# Patient Record
Sex: MALE | Race: BLACK OR AFRICAN AMERICAN | Employment: FULL TIME | URBAN - METROPOLITAN AREA
[De-identification: names, ages, dates, MRNs, and addresses within clinical notes are randomized per-mention and may not be internally consistent; named-entity substitution may affect disease eponyms.]

---

## 2017-08-02 ENCOUNTER — HOSPITAL ENCOUNTER (OUTPATIENT)
Dept: MRI IMAGING | Age: 47
Discharge: HOME | End: 2017-08-02
Payer: OTHER GOVERNMENT

## 2017-08-02 DIAGNOSIS — M25.561 KNEE PAIN, BILATERAL: ICD-10-CM

## 2017-08-02 DIAGNOSIS — M23.8X1 OTHER INTERNAL DERANGEMENTS OF RIGHT KNEE: ICD-10-CM

## 2017-08-02 DIAGNOSIS — M25.562 KNEE PAIN, BILATERAL: ICD-10-CM

## 2017-08-02 PROCEDURE — 73721 MRI JNT OF LWR EXTRE W/O DYE: CPT

## 2018-10-05 ENCOUNTER — APPOINTMENT (OUTPATIENT)
Dept: GENERAL RADIOLOGY | Age: 48
DRG: 853 | End: 2018-10-05
Attending: EMERGENCY MEDICINE
Payer: COMMERCIAL

## 2018-10-05 ENCOUNTER — HOSPITAL ENCOUNTER (INPATIENT)
Age: 48
LOS: 12 days | Discharge: NURSING, REHAB OR LONG-TERM CARE | DRG: 853 | End: 2018-10-17
Attending: EMERGENCY MEDICINE | Admitting: INTERNAL MEDICINE
Payer: COMMERCIAL

## 2018-10-05 DIAGNOSIS — M86.10 ACUTE OSTEOMYELITIS (HCC): Primary | ICD-10-CM

## 2018-10-05 DIAGNOSIS — R65.21 SEPTIC SHOCK (HCC): ICD-10-CM

## 2018-10-05 DIAGNOSIS — E83.42 HYPOMAGNESEMIA: ICD-10-CM

## 2018-10-05 DIAGNOSIS — E87.6 HYPOKALEMIA: ICD-10-CM

## 2018-10-05 DIAGNOSIS — A41.9 SEPTIC SHOCK (HCC): ICD-10-CM

## 2018-10-05 PROBLEM — E11.8 DIABETIC FOOT (HCC): Status: ACTIVE | Noted: 2018-10-05

## 2018-10-05 LAB
ABO + RH BLD: NORMAL
ALBUMIN SERPL-MCNC: 2.6 G/DL (ref 3.4–5)
ALBUMIN/GLOB SERPL: 0.5 {RATIO} (ref 0.8–1.7)
ALP SERPL-CCNC: 313 U/L (ref 45–117)
ALT SERPL-CCNC: 57 U/L (ref 16–61)
ANION GAP SERPL CALC-SCNC: 15 MMOL/L (ref 3–18)
APPEARANCE UR: CLEAR
AST SERPL-CCNC: 29 U/L (ref 15–37)
BACTERIA URNS QL MICRO: ABNORMAL /HPF
BASOPHILS # BLD: 0 K/UL (ref 0–0.1)
BASOPHILS NFR BLD: 0 % (ref 0–2)
BILIRUB SERPL-MCNC: 1.7 MG/DL (ref 0.2–1)
BILIRUB UR QL: NEGATIVE
BLOOD GROUP ANTIBODIES SERPL: NORMAL
BUN SERPL-MCNC: 7 MG/DL (ref 7–18)
BUN/CREAT SERPL: 8 (ref 12–20)
CALCIUM SERPL-MCNC: 8.2 MG/DL (ref 8.5–10.1)
CHLORIDE SERPL-SCNC: 94 MMOL/L (ref 100–108)
CO2 SERPL-SCNC: 22 MMOL/L (ref 21–32)
COLOR UR: YELLOW
CREAT SERPL-MCNC: 0.84 MG/DL (ref 0.6–1.3)
DIFFERENTIAL METHOD BLD: ABNORMAL
EOSINOPHIL # BLD: 0 K/UL (ref 0–0.4)
EOSINOPHIL NFR BLD: 0 % (ref 0–5)
EPITH CASTS URNS QL MICRO: ABNORMAL /LPF (ref 0–5)
ERYTHROCYTE [DISTWIDTH] IN BLOOD BY AUTOMATED COUNT: 13.2 % (ref 11.6–14.5)
ERYTHROCYTE [SEDIMENTATION RATE] IN BLOOD: 39 MM/HR (ref 0–15)
EST. AVERAGE GLUCOSE BLD GHB EST-MCNC: 229 MG/DL
GLOBULIN SER CALC-MCNC: 4.8 G/DL (ref 2–4)
GLUCOSE BLD STRIP.AUTO-MCNC: 292 MG/DL (ref 70–110)
GLUCOSE SERPL-MCNC: 258 MG/DL (ref 74–99)
GLUCOSE UR STRIP.AUTO-MCNC: >1000 MG/DL
HBA1C MFR BLD: 9.6 % (ref 4.2–5.6)
HCT VFR BLD AUTO: 38.2 % (ref 36–48)
HGB BLD-MCNC: 13.6 G/DL (ref 13–16)
HGB UR QL STRIP: NEGATIVE
INR PPP: 1.1 (ref 0.8–1.2)
KETONES UR QL STRIP.AUTO: NEGATIVE MG/DL
LACTATE BLD-SCNC: 4.5 MMOL/L (ref 0.4–2)
LEUKOCYTE ESTERASE UR QL STRIP.AUTO: NEGATIVE
LYMPHOCYTES # BLD: 1.4 K/UL (ref 0.9–3.6)
LYMPHOCYTES NFR BLD: 10 % (ref 21–52)
MAGNESIUM SERPL-MCNC: 1.2 MG/DL (ref 1.6–2.6)
MCH RBC QN AUTO: 29.9 PG (ref 24–34)
MCHC RBC AUTO-ENTMCNC: 35.6 G/DL (ref 31–37)
MCV RBC AUTO: 84 FL (ref 74–97)
MONOCYTES # BLD: 0.2 K/UL (ref 0.05–1.2)
MONOCYTES NFR BLD: 2 % (ref 3–10)
MUCOUS THREADS URNS QL MICRO: ABNORMAL /LPF
NEUTS SEG # BLD: 13.2 K/UL (ref 1.8–8)
NEUTS SEG NFR BLD: 88 % (ref 40–73)
NITRITE UR QL STRIP.AUTO: NEGATIVE
PH UR STRIP: 5.5 [PH] (ref 5–8)
PLATELET # BLD AUTO: 181 K/UL (ref 135–420)
PMV BLD AUTO: 10.4 FL (ref 9.2–11.8)
POTASSIUM SERPL-SCNC: 3 MMOL/L (ref 3.5–5.5)
PROT SERPL-MCNC: 7.4 G/DL (ref 6.4–8.2)
PROT UR STRIP-MCNC: ABNORMAL MG/DL
PROTHROMBIN TIME: 13.4 SEC (ref 11.5–15.2)
RBC # BLD AUTO: 4.55 M/UL (ref 4.7–5.5)
RBC #/AREA URNS HPF: ABNORMAL /HPF (ref 0–5)
SODIUM SERPL-SCNC: 131 MMOL/L (ref 136–145)
SP GR UR REFRACTOMETRY: 1.01 (ref 1–1.03)
SPECIMEN EXP DATE BLD: NORMAL
TROPONIN I SERPL-MCNC: <0.02 NG/ML (ref 0–0.04)
UROBILINOGEN UR QL STRIP.AUTO: 2 EU/DL (ref 0.2–1)
WBC # BLD AUTO: 14.9 K/UL (ref 4.6–13.2)
WBC URNS QL MICRO: ABNORMAL /HPF (ref 0–4)

## 2018-10-05 PROCEDURE — 83735 ASSAY OF MAGNESIUM: CPT | Performed by: EMERGENCY MEDICINE

## 2018-10-05 PROCEDURE — 82962 GLUCOSE BLOOD TEST: CPT

## 2018-10-05 PROCEDURE — 81001 URINALYSIS AUTO W/SCOPE: CPT | Performed by: EMERGENCY MEDICINE

## 2018-10-05 PROCEDURE — 85610 PROTHROMBIN TIME: CPT | Performed by: EMERGENCY MEDICINE

## 2018-10-05 PROCEDURE — 99285 EMERGENCY DEPT VISIT HI MDM: CPT

## 2018-10-05 PROCEDURE — 86900 BLOOD TYPING SEROLOGIC ABO: CPT | Performed by: EMERGENCY MEDICINE

## 2018-10-05 PROCEDURE — 80053 COMPREHEN METABOLIC PANEL: CPT | Performed by: EMERGENCY MEDICINE

## 2018-10-05 PROCEDURE — 96365 THER/PROPH/DIAG IV INF INIT: CPT

## 2018-10-05 PROCEDURE — 83036 HEMOGLOBIN GLYCOSYLATED A1C: CPT | Performed by: HOSPITALIST

## 2018-10-05 PROCEDURE — 83605 ASSAY OF LACTIC ACID: CPT

## 2018-10-05 PROCEDURE — 85652 RBC SED RATE AUTOMATED: CPT | Performed by: SINGLE SPECIALTY

## 2018-10-05 PROCEDURE — 87040 BLOOD CULTURE FOR BACTERIA: CPT | Performed by: EMERGENCY MEDICINE

## 2018-10-05 PROCEDURE — 85025 COMPLETE CBC W/AUTO DIFF WBC: CPT | Performed by: EMERGENCY MEDICINE

## 2018-10-05 PROCEDURE — 84484 ASSAY OF TROPONIN QUANT: CPT | Performed by: EMERGENCY MEDICINE

## 2018-10-05 PROCEDURE — 74011250637 HC RX REV CODE- 250/637: Performed by: EMERGENCY MEDICINE

## 2018-10-05 PROCEDURE — 86141 C-REACTIVE PROTEIN HS: CPT | Performed by: SINGLE SPECIALTY

## 2018-10-05 PROCEDURE — 71045 X-RAY EXAM CHEST 1 VIEW: CPT

## 2018-10-05 PROCEDURE — 74011000250 HC RX REV CODE- 250: Performed by: EMERGENCY MEDICINE

## 2018-10-05 PROCEDURE — 93005 ELECTROCARDIOGRAM TRACING: CPT

## 2018-10-05 PROCEDURE — 73630 X-RAY EXAM OF FOOT: CPT

## 2018-10-05 PROCEDURE — 65660000001 HC RM ICU INTERMED STEPDOWN

## 2018-10-05 PROCEDURE — 74011250636 HC RX REV CODE- 250/636: Performed by: EMERGENCY MEDICINE

## 2018-10-05 PROCEDURE — 74011000258 HC RX REV CODE- 258: Performed by: EMERGENCY MEDICINE

## 2018-10-05 PROCEDURE — 96360 HYDRATION IV INFUSION INIT: CPT

## 2018-10-05 RX ORDER — FENTANYL CITRATE 50 UG/ML
50 INJECTION, SOLUTION INTRAMUSCULAR; INTRAVENOUS ONCE
Status: COMPLETED | OUTPATIENT
Start: 2018-10-05 | End: 2018-10-05

## 2018-10-05 RX ORDER — NOREPINEPHRINE BIT/0.9 % NACL 8 MG/250ML
2-16 INFUSION BOTTLE (ML) INTRAVENOUS
Status: DISCONTINUED | OUTPATIENT
Start: 2018-10-05 | End: 2018-10-06

## 2018-10-05 RX ORDER — VANCOMYCIN/0.9 % SOD CHLORIDE 1.5G/250ML
1500 PLASTIC BAG, INJECTION (ML) INTRAVENOUS EVERY 8 HOURS
Status: DISCONTINUED | OUTPATIENT
Start: 2018-10-06 | End: 2018-10-05

## 2018-10-05 RX ORDER — DEXTROSE 50 % IN WATER (D50W) INTRAVENOUS SYRINGE
25-50 AS NEEDED
Status: DISCONTINUED | OUTPATIENT
Start: 2018-10-05 | End: 2018-10-06 | Stop reason: SDUPTHER

## 2018-10-05 RX ORDER — INSULIN LISPRO 100 [IU]/ML
INJECTION, SOLUTION INTRAVENOUS; SUBCUTANEOUS EVERY 6 HOURS
Status: DISCONTINUED | OUTPATIENT
Start: 2018-10-06 | End: 2018-10-06

## 2018-10-05 RX ORDER — POTASSIUM CHLORIDE 7.45 MG/ML
10 INJECTION INTRAVENOUS
Status: COMPLETED | OUTPATIENT
Start: 2018-10-05 | End: 2018-10-06

## 2018-10-05 RX ORDER — SODIUM CHLORIDE 0.9 % (FLUSH) 0.9 %
5-10 SYRINGE (ML) INJECTION AS NEEDED
Status: DISCONTINUED | OUTPATIENT
Start: 2018-10-05 | End: 2018-10-17 | Stop reason: HOSPADM

## 2018-10-05 RX ORDER — VANCOMYCIN/0.9 % SOD CHLORIDE 1.5G/250ML
1500 PLASTIC BAG, INJECTION (ML) INTRAVENOUS EVERY 8 HOURS
Status: DISCONTINUED | OUTPATIENT
Start: 2018-10-06 | End: 2018-10-07

## 2018-10-05 RX ORDER — MORPHINE SULFATE 2 MG/ML
2 INJECTION, SOLUTION INTRAMUSCULAR; INTRAVENOUS
Status: DISCONTINUED | OUTPATIENT
Start: 2018-10-05 | End: 2018-10-06

## 2018-10-05 RX ORDER — VANCOMYCIN 2 GRAM/500 ML IN 0.9 % SODIUM CHLORIDE INTRAVENOUS
2000 ONCE
Status: COMPLETED | OUTPATIENT
Start: 2018-10-05 | End: 2018-10-05

## 2018-10-05 RX ORDER — MAGNESIUM SULFATE 100 %
4 CRYSTALS MISCELLANEOUS AS NEEDED
Status: DISCONTINUED | OUTPATIENT
Start: 2018-10-05 | End: 2018-10-06 | Stop reason: SDUPTHER

## 2018-10-05 RX ORDER — ACETAMINOPHEN 500 MG
1000 TABLET ORAL
Status: COMPLETED | OUTPATIENT
Start: 2018-10-05 | End: 2018-10-05

## 2018-10-05 RX ADMIN — SODIUM CHLORIDE 1000 ML: 900 INJECTION, SOLUTION INTRAVENOUS at 19:38

## 2018-10-05 RX ADMIN — SODIUM CHLORIDE 1000 ML: 900 INJECTION, SOLUTION INTRAVENOUS at 22:28

## 2018-10-05 RX ADMIN — ACETAMINOPHEN 1000 MG: 500 TABLET, FILM COATED ORAL at 23:30

## 2018-10-05 RX ADMIN — POTASSIUM CHLORIDE 10 MEQ: 7.46 INJECTION, SOLUTION INTRAVENOUS at 23:45

## 2018-10-05 RX ADMIN — VANCOMYCIN HYDROCHLORIDE 2000 MG: 10 INJECTION, POWDER, LYOPHILIZED, FOR SOLUTION INTRAVENOUS at 20:27

## 2018-10-05 RX ADMIN — FENTANYL CITRATE 50 MCG: 50 INJECTION, SOLUTION INTRAMUSCULAR; INTRAVENOUS at 23:30

## 2018-10-05 RX ADMIN — SODIUM CHLORIDE 1000 ML: 900 INJECTION, SOLUTION INTRAVENOUS at 19:54

## 2018-10-05 RX ADMIN — NOREPINEPHRINE BITARTRATE 2 MCG/MIN: 1 INJECTION INTRAVENOUS at 23:38

## 2018-10-05 RX ADMIN — PIPERACILLIN SODIUM,TAZOBACTAM SODIUM 4.5 G: 4; .5 INJECTION, POWDER, FOR SOLUTION INTRAVENOUS at 19:51

## 2018-10-05 RX ADMIN — SODIUM CHLORIDE 394 ML: 900 INJECTION, SOLUTION INTRAVENOUS at 21:57

## 2018-10-05 NOTE — ED TRIAGE NOTES
Seen Wednesday for same complaints at [facility]. Pain all over, chills, wound on foot. . Code sepsis called at 0700 to charge nurse. SOB.

## 2018-10-05 NOTE — ED PROVIDER NOTES
EMERGENCY DEPARTMENT HISTORY AND PHYSICAL EXAM 
 
7:22 PM 
 
 
Date: 10/5/2018 Patient Name: Yajaira Arrington History of Presenting Illness Chief Complaint Patient presents with  Shortness of Breath  Foot Pain  Blurred Vision History Provided By: Patient's Wife Chief Complaint: SOB Duration:  today Timing:  Constant Location: N/A Quality: N/A Severity: Moderate Modifying Factors: No modifying factors Associated Symptoms: Generalized myalgia, right foot abcess, numbness, visual disturbances Additional History (Context):7:28 PM Yajaira Arrington is a 41 y.o. male with h/o diabetes and sarcoidosis who presents to ED complaining of constant moderate SOB onset today, associated with generalized myalgia, numbness of toes, fingers, and legs, as well as visual disturbances--all with no modifying factors. Patients wife provided history consisting of NKDA, MI, diabetes, sarcoidosis, chemotherapy, steroid injections, lung biopsy, US of toes and claims the patient has had generalized constant myalgia for days, as well as alternating sweats/chills. The patient also presents with abscess of the second toe on the right foot associated with black toes and numbness extending to the legs. A PCP at the [place] and provider in wound care previously seen for the issue. No other concerns or symptoms at this time. PCP: None Current Facility-Administered Medications Medication Dose Route Frequency Provider Last Rate Last Dose  fentaNYL citrate (PF) injection 100 mcg  100 mcg IntraVENous NOW Oren Miramontes MD      
 magnesium sulfate 2 g/50 ml IVPB (premix or compounded)  2 g IntraVENous ONCE Oren Miramontes MD      
 magnesium sulfate 2 g/50 ml IVPB (premix or compounded)  2 g IntraVENous Megan Jones MD      
 sodium chloride (NS) flush 5-10 mL  5-10 mL IntraVENous PRN Oren Miramontes MD      
 piperacillin-tazobactam (ZOSYN) 4.5 g in 0.9% sodium chloride (MBP/ADV) 100 mL MBP  4.5 g IntraVENous Q6H Khari Baird MD   Stopped at 10/05/18 2023  VANCOMYCIN INFORMATION NOTE   Other Rx Dosing/Monitoring Khari Baird MD      
 insulin lispro (HUMALOG) injection   SubCUTAneous Q6H Barbra Alexander MD      
 glucose chewable tablet 16 g  4 Tab Oral PRN Barbra Alexander MD      
 glucagon (GLUCAGEN) injection 1 mg  1 mg IntraMUSCular PRN Barbra Alexander MD      
 dextrose (D50W) injection syrg 12.5-25 g  25-50 mL IntraVENous PRN Barbra Alexander MD      
 morphine injection 2 mg  2 mg IntraVENous Q4H PRN Barbra Alexander MD      
 NOREPINephrine (LEVOPHED) 8 mg in 0.9% NS 250ml infusion  2-16 mcg/min IntraVENous TITRATE Khari Baird MD 3.8 mL/hr at 10/05/18 2338 2 mcg/min at 10/05/18 2338  sodium chloride 0.9 % bolus infusion 1,000 mL  1,000 mL IntraVENous Jason Flood MD      
 sodium chloride 0.9 % bolus infusion 1,000 mL  1,000 mL IntraVENous Jason Flood MD      
 [START ON 10/7/2018] VANCOMYCIN INFORMATION NOTE   Other ONCE Ana Shin DPM      
 vancomycin (VANCOCIN) 1500 mg in  ml infusion  1,500 mg IntraVENous Q8H Ana Shin DPM      
 
Current Outpatient Prescriptions Medication Sig Dispense Refill  metroNIDAZOLE (FLAGYL) 500 mg tablet Take 1 Tab by mouth three (3) times daily. 18 Tab 0  
 ciprofloxacin HCl (CIPRO) 250 mg tablet Take 1 Tab by mouth every twelve (12) hours. 12 Tab 0  
 insulin detemir (LEVEMIR) 100 unit/mL injection 0.45 mL by SubCUTAneous route nightly. 1 Vial 1  
 gemfibrozil (LOPID) 600 mg tablet Take 600 mg by mouth two (2) times a day.  GABAPENTIN PO Take  by mouth.  albuterol (PROVENTIL HFA, VENTOLIN HFA, PROAIR HFA) 90 mcg/actuation inhaler Take 2 Puffs by inhalation every four (4) hours as needed for Wheezing.  dextran 70-hypromellose (ARTIFICIAL TEARS) ophthalmic solution Administer 2 Drops to both eyes as needed.  glipiZIDE (GLUCOTROL) 10 mg tablet Take 10 mg by mouth two (2) times a day. Indications: TYPE 2 DIABETES MELLITUS  metoprolol tartrate (LOPRESSOR) 50 mg tablet Take 50 mg by mouth two (2) times a day.  omeprazole (PRILOSEC) 20 mg capsule Take 20 mg by mouth daily.  predniSONE (DELTASONE) 10 mg tablet Take 25 mg by mouth daily (with breakfast). Indications: SARCOIDOSIS  venlafaxine-SR (EFFEXOR-XR) 75 mg capsule Take  by mouth daily. Indications: POST TRAUMATIC STRESS DISORDER    
 sildenafil citrate (VIAGRA) 100 mg tablet Take 100 mg by mouth as needed.  aspirin (ASPIRIN) 325 mg tablet Take 325 mg by mouth daily. Past History Past Medical History: 
Past Medical History:  
Diagnosis Date  Diabetes ([facility] [address]  Hypercholesteremia  [facility]  Sarcoidosis Past Surgical History: 
Past Surgical History:  
Procedure Laterality Date  HX CORONARY STENT PLACEMENT    
 HX HEART CATHETERIZATION Family History: 
History reviewed. No pertinent family history. Social History: 
Social History Substance Use Topics  Smoking status: Current Every Day Smoker Packs/day: 0.50  Smokeless tobacco: Never Used  Alcohol use No  
 
 
Allergies: 
No Known Allergies Review of Systems Review of Systems Constitutional: Positive for chills and diaphoresis. Eyes: Positive for visual disturbance. Respiratory: Positive for shortness of breath. Musculoskeletal: Positive for myalgias. Skin: Positive for wound. Neurological: Positive for numbness. All other systems reviewed and are negative. Visit Vitals  /72  Pulse (!) 126  Temp (!) 102.7 °F (39.3 °C)  Resp 18  Ht 6' 2\" (1.88 m)  Wt 79.8 kg (176 lb)  SpO2 100%  BMI 22.6 kg/m2 Physical Exam / Medical Decision Making I am the first provider for this patient. I reviewed the vital signs, available nursing notes, past medical history, past surgical history, family history and social history. Vital Signs-Reviewed the patient's vital signs. 10:00 PM - I suspect that this patient has an active infection. 10:00 PM - The patient met criteria for septic shock at this time. Physical exam: 
General:  Well-developed, well-nourished, warm to touch, nondiaphoretic. Head:  Normocephalic atraumatic. Eyes:  Pupils midrange extraocular movements intact. No pallor or conjunctival injection. Nose:  No rhinorrhea, inspection grossly normal.   
Ears:  Grossly normal to inspection, no discharge. Mouth:  Mucous membranes moist, no appreciable intraoral lesion. Neck/Back:  Trachea midline, no asymmetry. Chest:  Grossly normal inspection, symmetric chest rise. Pulmonary:  Clear to auscultation bilaterally no wheezes rhonchi or rales. Cardiovascular:  S1-S2 no murmurs rubs or gallops. Abdomen: Soft, nontender, nondistended no guarding rebound or peritoneal signs. Extremities:  Grossly normal to inspection, peripheral pulses intact  sequentially edematous and erythematous RIGHT shin and dorsum of the RIGHT foot, necrotic RIGHT 2nd toe with tendon exposed Neurologic:  Alert and oriented no appreciable focal neurologic deficit Skin:  Warm and dry Psychiatric:  Grossly normal mood and affect Nursing note reviewed, vital signs reviewed. ED course: 
Patient presents with acute osteomyelitis surge criteria, lactic acid was elevated at 4.2 septic shock with borderline low blood pressure, seen immediately upon arrival sepsis bundle activated Patient reevaluated after 1st liter, his blood pressures responded to fluids, remains tachycardic in the 120s Started Vancomycin, Zosyn X-ray chest per my interpretation no pneumothorax or infiltrate Monitor sinus tachycardia EKG done at this 1931 hrs. sinus tachycardia heart rate 126 intervals within normal limits, ST depressions throughout, likely demand related Consult:  Discussed care with Dr. Fina Sinclair. Standard discussion; including history of patients chief complaint, available diagnostic results, and treatment course. Discussed my concern for acute osteomyelitis and possible source control, agrees with current management Patient's presentation, history, physical exam and laboratory evaluations were reviewed. I felt the patient would benefit from inpatient management and treatment. Consult:  Discussed care with Dr. Cipriano Tovar. Standard discussion; including history of patients chief complaint, available diagnostic results, and treatment course. Patient was accepted to their service. Patient reevaluated, now hypotensive after is empiric fluid bolus, we'll start central line pressors and continue IV fluids Consent: It was deemed medically necessary to perform central line. The patient was informed about the risks benefits and alternatives for the procedure. I answered all questions to the best of my ability. The patient elected to proceed with the procedure. Written and verbal consent was obtained. Procedure note: 
Central line placement: 
Consent:  Written Performed by :  David Zimmerman MD 
Indications:  Vascular access Skin Prep: Chlorhexidine , Skin prep agent was tried completely prior to procedure. All elements of maximal sterile barrier technique were followed The patient was reevaluated and felt suitable for planned procedure and meds Time out was called immediately prior to the procedure location: Anesthesia:  None Sedation:  None Patient position:  Trendelenburg Location: Right internal jugular Catheter type: Triple-lumen Ultrasound guidance: Yes NUMBER of attempts:  1 Post procedure: Line sutured in dressing applied with bio patch in place Assessment: There is blood return through all ports, dark red, there is free nonpulsatile fluid flow. Complications: None Estimated blood loss: 5 mL Patient tolerated procedure well with no immediate complications Total M.D. procedure time:  20 minutes IVC appeared collapsible,  ordered another 2 L of fluid, continue with current management Chest x-ray central line in proper location without pneumothorax Consult:  Discussed care with Dr. Omaira Cisneros. Standard discussion; including history of patients chief complaint, available diagnostic results, and treatment course. Disposition: 
 
Admitted to medicine service Portions of this chart were created with Dragon medical speech to text program.   Unrecognized errors may be present. PROVIDER SEPSIS PHYSICAL EXAM EVAL Vital signs reviewed (see nursing documentation for further details): Vitals:  
 10/05/18 2030 10/05/18 2045 10/05/18 2100 10/05/18 2115 BP: 158/79 139/75 129/71 124/72 Pulse: (!) 129 (!) 122 (!) 122 (!) 126 Resp: 16 12 11 18 Temp:      
SpO2: 100% 100% 100% 100% Cardiac exam:Tachycardic Pulmonary exam:Normal 
 
Peripheral pulses:Normal 
 
Capillary refill:Normal 
 
Skin exam:pink Exam performed Jaun Hartley MD  
 
10:01 PM Patient BP is 90s/60s with a map of 61 after 30cc/kg fluid bolus. Will start pressors and admit to ICU. Critical Care Time: The services I provided to this patient were to treat and/or prevent clinically significant deterioration that could result in the failure of one or more body systems and/or organ systems due to septic shock. Services included the following: 
-reviewing nursing notes and old charts 
-vital sign assessments 
-direct patient care 
-medication orders and management 
-interpreting and reviewing diagnostic studies/labs 
-re-evaluations 
-documentation time Aggregate critical care time was 50 minutes, which includes only time during which I was engaged in work directly related to the patient's care as described above, whether I was at bedside or elsewhere in the Emergency Department. It did not include time spent performing other reported procedures or the services of residents, students, nurses, or advance practice providers. Ronal Hunt MD 
 
10:09 PM 
 
 
 
Diagnostic Study Results Labs - Recent Results (from the past 12 hour(s)) GLUCOSE, POC Collection Time: 10/05/18  7:08 PM  
Result Value Ref Range Glucose (POC) 292 (H) 70 - 110 mg/dL CULTURE, BLOOD Collection Time: 10/05/18  7:20 PM  
Result Value Ref Range Special Requests: PERIPHERAL Culture result: PENDING   
METABOLIC PANEL, COMPREHENSIVE Collection Time: 10/05/18  7:20 PM  
Result Value Ref Range Sodium 131 (L) 136 - 145 mmol/L Potassium 3.0 (L) 3.5 - 5.5 mmol/L Chloride 94 (L) 100 - 108 mmol/L  
 CO2 22 21 - 32 mmol/L Anion gap 15 3.0 - 18 mmol/L Glucose 258 (H) 74 - 99 mg/dL BUN 7 7.0 - 18 MG/DL Creatinine 0.84 0.6 - 1.3 MG/DL  
 BUN/Creatinine ratio 8 (L) 12 - 20 GFR est AA >60 >60 ml/min/1.73m2 GFR est non-AA >60 >60 ml/min/1.73m2 Calcium 8.2 (L) 8.5 - 10.1 MG/DL Bilirubin, total 1.7 (H) 0.2 - 1.0 MG/DL  
 ALT (SGPT) 57 16 - 61 U/L  
 AST (SGOT) 29 15 - 37 U/L Alk. phosphatase 313 (H) 45 - 117 U/L Protein, total 7.4 6.4 - 8.2 g/dL Albumin 2.6 (L) 3.4 - 5.0 g/dL Globulin 4.8 (H) 2.0 - 4.0 g/dL A-G Ratio 0.5 (L) 0.8 - 1.7    
CBC WITH AUTOMATED DIFF Collection Time: 10/05/18  7:20 PM  
Result Value Ref Range WBC 14.9 (H) 4.6 - 13.2 K/uL  
 RBC 4.55 (L) 4.70 - 5.50 M/uL  
 HGB 13.6 13.0 - 16.0 g/dL HCT 38.2 36.0 - 48.0 % MCV 84.0 74.0 - 97.0 FL  
 MCH 29.9 24.0 - 34.0 PG  
 MCHC 35.6 31.0 - 37.0 g/dL  
 RDW 13.2 11.6 - 14.5 % PLATELET 200 003 - 679 K/uL MPV 10.4 9.2 - 11.8 FL  
 NEUTROPHILS 88 (H) 40 - 73 % LYMPHOCYTES 10 (L) 21 - 52 % MONOCYTES 2 (L) 3 - 10 % EOSINOPHILS 0 0 - 5 % BASOPHILS 0 0 - 2 % ABS. NEUTROPHILS 13.2 (H) 1.8 - 8.0 K/UL  
 ABS. LYMPHOCYTES 1.4 0.9 - 3.6 K/UL  
 ABS. MONOCYTES 0.2 0.05 - 1.2 K/UL  
 ABS. EOSINOPHILS 0.0 0.0 - 0.4 K/UL  
 ABS. BASOPHILS 0.0 0.0 - 0.1 K/UL  
 DF AUTOMATED    
TYPE & SCREEN Collection Time: 10/05/18  7:20 PM  
Result Value Ref Range Crossmatch Expiration 10/08/2018 ABO/Rh(D) O POSITIVE Antibody screen NEG PROTHROMBIN TIME + INR Collection Time: 10/05/18  7:20 PM  
Result Value Ref Range Prothrombin time 13.4 11.5 - 15.2 sec INR 1.1 0.8 - 1.2 SED RATE (ESR) Collection Time: 10/05/18  7:20 PM  
Result Value Ref Range Sed rate, automated 39 (H) 0 - 15 mm/hr HEMOGLOBIN A1C WITH EAG Collection Time: 10/05/18  7:20 PM  
Result Value Ref Range Hemoglobin A1c 9.6 (H) 4.2 - 5.6 % Est. average glucose 229 mg/dL TROPONIN I Collection Time: 10/05/18  7:20 PM  
Result Value Ref Range Troponin-I, Qt. <0.02 0.0 - 0.045 NG/ML  
MAGNESIUM Collection Time: 10/05/18  7:20 PM  
Result Value Ref Range Magnesium 1.2 (L) 1.6 - 2.6 mg/dL POC LACTIC ACID Collection Time: 10/05/18  7:27 PM  
Result Value Ref Range Lactic Acid (POC) 4.5 (HH) 0.4 - 2.0 mmol/L  
CULTURE, BLOOD Collection Time: 10/05/18  7:31 PM  
Result Value Ref Range Special Requests: PERIPHERAL Culture result: PENDING   
EKG, 12 LEAD, INITIAL Collection Time: 10/05/18  7:31 PM  
Result Value Ref Range Ventricular Rate 126 BPM  
 Atrial Rate 126 BPM  
 P-R Interval 122 ms QRS Duration 90 ms Q-T Interval 344 ms QTC Calculation (Bezet) 498 ms Calculated P Axis 63 degrees Calculated R Axis 65 degrees Calculated T Axis -96 degrees Diagnosis Sinus tachycardia Possible Left atrial enlargement Marked ST abnormality, possible inferior subendocardial injury Abnormal ECG When compared with ECG of 13-MAY-2016 22:10, No significant change was found URINALYSIS W/ RFLX MICROSCOPIC  
 Collection Time: 10/05/18  9:20 PM  
Result Value Ref Range Color YELLOW Appearance CLEAR Specific gravity 1.011 1.005 - 1.030    
 pH (UA) 5.5 5.0 - 8.0 Protein TRACE (A) NEG mg/dL Glucose >1000 (A) NEG mg/dL Ketone NEGATIVE  NEG mg/dL Bilirubin NEGATIVE  NEG Blood NEGATIVE  NEG Urobilinogen 2.0 (H) 0.2 - 1.0 EU/dL Nitrites NEGATIVE  NEG Leukocyte Esterase NEGATIVE  NEG    
URINE MICROSCOPIC ONLY Collection Time: 10/05/18  9:20 PM  
Result Value Ref Range WBC 0 to 3 0 - 4 /hpf  
 RBC 0 to 3 0 - 5 /hpf Epithelial cells FEW 0 - 5 /lpf Bacteria 1+ (A) NEG /hpf Mucus 1+ (A) NEG /lpf Radiologic Studies -  
XR CHEST PORT    (Results Pending) XR FOOT RT MIN 3 V    (Results Pending) XR CHEST PORT    (Results Pending) Diagnosis Clinical Impression: 1. Acute osteomyelitis ([facility] [address] 2. Septic shock ([facility] [address] Follow-up Information None Patient's Medications Start Taking No medications on file Continue Taking ALBUTEROL (PROVENTIL HFA, VENTOLIN HFA, PROAIR HFA) 90 MCG/ACTUATION INHALER    Take 2 Puffs by inhalation every four (4) hours as needed for Wheezing. ASPIRIN (ASPIRIN) 325 MG TABLET    Take 325 mg by mouth daily. CIPROFLOXACIN HCL (CIPRO) 250 MG TABLET    Take 1 Tab by mouth every twelve (12) hours. DEXTRAN 70-HYPROMELLOSE (ARTIFICIAL TEARS) OPHTHALMIC SOLUTION    Administer 2 Drops to both eyes as needed. GABAPENTIN PO    Take  by mouth. GEMFIBROZIL (LOPID) 600 MG TABLET    Take 600 mg by mouth two (2) times a day. GLIPIZIDE (GLUCOTROL) 10 MG TABLET    Take 10 mg by mouth two (2) times a day. Indications: TYPE 2 DIABETES MELLITUS INSULIN DETEMIR (LEVEMIR) 100 UNIT/ML INJECTION    0.45 mL by SubCUTAneous route nightly. METOPROLOL TARTRATE (LOPRESSOR) 50 MG TABLET    Take 50 mg by mouth two (2) times a day.   
 METRONIDAZOLE (FLAGYL) 500 MG TABLET    Take 1 Tab by mouth three (3) times daily. OMEPRAZOLE (PRILOSEC) 20 MG CAPSULE    Take 20 mg by mouth daily. PREDNISONE (DELTASONE) 10 MG TABLET    Take 25 mg by mouth daily (with breakfast). Indications: SARCOIDOSIS  
 SILDENAFIL CITRATE (VIAGRA) 100 MG TABLET    Take 100 mg by mouth as needed. VENLAFAXINE-SR (EFFEXOR-XR) 75 MG CAPSULE    Take  by mouth daily. Indications: POST TRAUMATIC STRESS DISORDER These Medications have changed No medications on file Stop Taking No medications on file  
 
_______________________________ Attestations: 
Scribe Attestation Sohail Hernandez and [employer] acting as a scribe for and in the presence of Oren Miramontes MD     
October 05, 2018 at [facility] PM 
    
Provider Attestation:     
I personally performed the services described in the documentation, reviewed the documentation, as recorded by the scribe in my presence, and it accurately and completely records my words and actions. October 05, 2018 at 7:22 PM - Oren Miramontes MD   
_______________________________

## 2018-10-05 NOTE — Clinical Note
Status[de-identified] INPATIENT [101] Type of Bed: Stepdown [17] Inpatient Hospitalization Certified Necessary for the Following Reasons: 9. Other (further clarification in H&P documentation) Admitting Diagnosis: Sepsis ([facility] [[id number]] Admitting Diagnosis: Diabetic foot ([facility] [[id number]] Admitting Physician: Rosa Neumann [[id number]] Attending Physician: Rosa Neumann [[id number]] Estimated Length of Stay: 3-4 Midnights Discharge Plan[de-identified] Home with Office Follow-up

## 2018-10-06 ENCOUNTER — ANESTHESIA EVENT (OUTPATIENT)
Dept: SURGERY | Age: 48
DRG: 853 | End: 2018-10-06
Payer: COMMERCIAL

## 2018-10-06 ENCOUNTER — ANESTHESIA (OUTPATIENT)
Dept: SURGERY | Age: 48
DRG: 853 | End: 2018-10-06
Payer: COMMERCIAL

## 2018-10-06 PROBLEM — E11.621 DIABETIC FOOT ULCER (HCC): Status: ACTIVE | Noted: 2018-10-06

## 2018-10-06 PROBLEM — L97.509 DIABETIC FOOT ULCER (HCC): Status: ACTIVE | Noted: 2018-10-06

## 2018-10-06 PROBLEM — M00.9: Status: ACTIVE | Noted: 2018-10-06

## 2018-10-06 LAB
ALBUMIN SERPL-MCNC: 1.7 G/DL (ref 3.4–5)
ALBUMIN/GLOB SERPL: 0.5 {RATIO} (ref 0.8–1.7)
ALP SERPL-CCNC: 204 U/L (ref 45–117)
ALT SERPL-CCNC: 39 U/L (ref 16–61)
ANION GAP SERPL CALC-SCNC: 8 MMOL/L (ref 3–18)
APTT PPP: 46.4 SEC (ref 23–36.4)
AST SERPL-CCNC: 22 U/L (ref 15–37)
BASOPHILS # BLD: 0 K/UL (ref 0–0.1)
BASOPHILS NFR BLD: 0 % (ref 0–2)
BILIRUB SERPL-MCNC: 1.4 MG/DL (ref 0.2–1)
BUN SERPL-MCNC: 5 MG/DL (ref 7–18)
BUN/CREAT SERPL: 10 (ref 12–20)
CALCIUM SERPL-MCNC: 6.6 MG/DL (ref 8.5–10.1)
CHLORIDE SERPL-SCNC: 106 MMOL/L (ref 100–108)
CHOLEST SERPL-MCNC: 115 MG/DL
CO2 SERPL-SCNC: 23 MMOL/L (ref 21–32)
CREAT SERPL-MCNC: 0.48 MG/DL (ref 0.6–1.3)
CRP SERPL HS-MCNC: 263.07 MG/L (ref 0–3)
DIFFERENTIAL METHOD BLD: ABNORMAL
EOSINOPHIL # BLD: 0.1 K/UL (ref 0–0.4)
EOSINOPHIL NFR BLD: 1 % (ref 0–5)
ERYTHROCYTE [DISTWIDTH] IN BLOOD BY AUTOMATED COUNT: 13.2 % (ref 11.6–14.5)
GLOBULIN SER CALC-MCNC: 3.3 G/DL (ref 2–4)
GLUCOSE BLD STRIP.AUTO-MCNC: 210 MG/DL (ref 70–110)
GLUCOSE BLD STRIP.AUTO-MCNC: 216 MG/DL (ref 70–110)
GLUCOSE BLD STRIP.AUTO-MCNC: 263 MG/DL (ref 70–110)
GLUCOSE BLD STRIP.AUTO-MCNC: 305 MG/DL (ref 70–110)
GLUCOSE SERPL-MCNC: 216 MG/DL (ref 74–99)
HCT VFR BLD AUTO: 29.2 % (ref 36–48)
HDLC SERPL-MCNC: 25 MG/DL (ref 40–60)
HDLC SERPL: 4.6 {RATIO} (ref 0–5)
HGB BLD-MCNC: 10.1 G/DL (ref 13–16)
LACTATE BLD-SCNC: 0.7 MMOL/L (ref 0.4–2)
LACTATE SERPL-SCNC: 1.6 MMOL/L (ref 0.4–2)
LDLC SERPL CALC-MCNC: 65.2 MG/DL (ref 0–100)
LIPID PROFILE,FLP: ABNORMAL
LYMPHOCYTES # BLD: 0.5 K/UL (ref 0.9–3.6)
LYMPHOCYTES NFR BLD: 5 % (ref 21–52)
MCH RBC QN AUTO: 28.9 PG (ref 24–34)
MCHC RBC AUTO-ENTMCNC: 34.6 G/DL (ref 31–37)
MCV RBC AUTO: 83.7 FL (ref 74–97)
MONOCYTES # BLD: 0.9 K/UL (ref 0.05–1.2)
MONOCYTES NFR BLD: 9 % (ref 3–10)
NEUTS SEG # BLD: 8.8 K/UL (ref 1.8–8)
NEUTS SEG NFR BLD: 85 % (ref 40–73)
PLATELET # BLD AUTO: 110 K/UL (ref 135–420)
PMV BLD AUTO: 10.3 FL (ref 9.2–11.8)
POTASSIUM SERPL-SCNC: 3 MMOL/L (ref 3.5–5.5)
PROT SERPL-MCNC: 5 G/DL (ref 6.4–8.2)
RBC # BLD AUTO: 3.49 M/UL (ref 4.7–5.5)
SODIUM SERPL-SCNC: 137 MMOL/L (ref 136–145)
TRIGL SERPL-MCNC: 124 MG/DL (ref ?–150)
VLDLC SERPL CALC-MCNC: 24.8 MG/DL
WBC # BLD AUTO: 10.4 K/UL (ref 4.6–13.2)

## 2018-10-06 PROCEDURE — 82962 GLUCOSE BLOOD TEST: CPT

## 2018-10-06 PROCEDURE — 74011250636 HC RX REV CODE- 250/636: Performed by: INTERNAL MEDICINE

## 2018-10-06 PROCEDURE — 87186 SC STD MICRODIL/AGAR DIL: CPT | Performed by: INTERNAL MEDICINE

## 2018-10-06 PROCEDURE — 77030003601 HC NDL NRV BLK BBMI -A: Performed by: ANESTHESIOLOGY

## 2018-10-06 PROCEDURE — 74011000258 HC RX REV CODE- 258: Performed by: EMERGENCY MEDICINE

## 2018-10-06 PROCEDURE — 85730 THROMBOPLASTIN TIME PARTIAL: CPT | Performed by: HOSPITALIST

## 2018-10-06 PROCEDURE — 76210000017 HC OR PH I REC 1.5 TO 2 HR: Performed by: SINGLE SPECIALTY

## 2018-10-06 PROCEDURE — 74011000258 HC RX REV CODE- 258: Performed by: INTERNAL MEDICINE

## 2018-10-06 PROCEDURE — 65270000029 HC RM PRIVATE

## 2018-10-06 PROCEDURE — 77030013708 HC HNDPC SUC IRR PULS STRY –B: Performed by: SINGLE SPECIALTY

## 2018-10-06 PROCEDURE — 83605 ASSAY OF LACTIC ACID: CPT | Performed by: HOSPITALIST

## 2018-10-06 PROCEDURE — 36415 COLL VENOUS BLD VENIPUNCTURE: CPT | Performed by: HOSPITALIST

## 2018-10-06 PROCEDURE — 77030021352 HC CBL LD SYS DISP COVD -B

## 2018-10-06 PROCEDURE — 74011636637 HC RX REV CODE- 636/637: Performed by: INTERNAL MEDICINE

## 2018-10-06 PROCEDURE — 0Y6R0Z0 DETACHMENT AT RIGHT 2ND TOE, COMPLETE, OPEN APPROACH: ICD-10-PCS | Performed by: SINGLE SPECIALTY

## 2018-10-06 PROCEDURE — 77030032490 HC SLV COMPR SCD KNE COVD -B

## 2018-10-06 PROCEDURE — 74011636637 HC RX REV CODE- 636/637: Performed by: HOSPITALIST

## 2018-10-06 PROCEDURE — 80053 COMPREHEN METABOLIC PANEL: CPT | Performed by: HOSPITALIST

## 2018-10-06 PROCEDURE — 83605 ASSAY OF LACTIC ACID: CPT

## 2018-10-06 PROCEDURE — 74011000250 HC RX REV CODE- 250: Performed by: NURSE ANESTHETIST, CERTIFIED REGISTERED

## 2018-10-06 PROCEDURE — 80061 LIPID PANEL: CPT | Performed by: HOSPITALIST

## 2018-10-06 PROCEDURE — 87102 FUNGUS ISOLATION CULTURE: CPT | Performed by: INTERNAL MEDICINE

## 2018-10-06 PROCEDURE — 77030018842 HC SOL IRR SOD CL 9% BAXT -A: Performed by: SINGLE SPECIALTY

## 2018-10-06 PROCEDURE — 77030018836 HC SOL IRR NACL ICUM -A: Performed by: SINGLE SPECIALTY

## 2018-10-06 PROCEDURE — 87075 CULTR BACTERIA EXCEPT BLOOD: CPT | Performed by: INTERNAL MEDICINE

## 2018-10-06 PROCEDURE — 74011250636 HC RX REV CODE- 250/636: Performed by: SINGLE SPECIALTY

## 2018-10-06 PROCEDURE — 74011250636 HC RX REV CODE- 250/636: Performed by: HOSPITALIST

## 2018-10-06 PROCEDURE — 77030020753 HC CUF TRNQT 1BLA STRY -B: Performed by: SINGLE SPECIALTY

## 2018-10-06 PROCEDURE — 77030019895 HC PCKNG STRP IODO -A: Performed by: SINGLE SPECIALTY

## 2018-10-06 PROCEDURE — 77030032490 HC SLV COMPR SCD KNE COVD -B: Performed by: SINGLE SPECIALTY

## 2018-10-06 PROCEDURE — 74011250636 HC RX REV CODE- 250/636

## 2018-10-06 PROCEDURE — 76010000149 HC OR TIME 1 TO 1.5 HR: Performed by: SINGLE SPECIALTY

## 2018-10-06 PROCEDURE — 85025 COMPLETE CBC W/AUTO DIFF WBC: CPT | Performed by: INTERNAL MEDICINE

## 2018-10-06 PROCEDURE — 88305 TISSUE EXAM BY PATHOLOGIST: CPT | Performed by: SINGLE SPECIALTY

## 2018-10-06 PROCEDURE — 76060000033 HC ANESTHESIA 1 TO 1.5 HR: Performed by: SINGLE SPECIALTY

## 2018-10-06 PROCEDURE — 74011250636 HC RX REV CODE- 250/636: Performed by: EMERGENCY MEDICINE

## 2018-10-06 PROCEDURE — 74011250637 HC RX REV CODE- 250/637: Performed by: HOSPITALIST

## 2018-10-06 PROCEDURE — 87077 CULTURE AEROBIC IDENTIFY: CPT | Performed by: INTERNAL MEDICINE

## 2018-10-06 PROCEDURE — 87070 CULTURE OTHR SPECIMN AEROBIC: CPT | Performed by: INTERNAL MEDICINE

## 2018-10-06 PROCEDURE — 88311 DECALCIFY TISSUE: CPT | Performed by: SINGLE SPECIALTY

## 2018-10-06 RX ORDER — MORPHINE SULFATE 4 MG/ML
2 INJECTION INTRAVENOUS
Status: DISCONTINUED | OUTPATIENT
Start: 2018-10-06 | End: 2018-10-17 | Stop reason: HOSPADM

## 2018-10-06 RX ORDER — ONDANSETRON 2 MG/ML
4 INJECTION INTRAMUSCULAR; INTRAVENOUS ONCE
Status: DISCONTINUED | OUTPATIENT
Start: 2018-10-06 | End: 2018-10-06 | Stop reason: HOSPADM

## 2018-10-06 RX ORDER — INSULIN LISPRO 100 [IU]/ML
INJECTION, SOLUTION INTRAVENOUS; SUBCUTANEOUS
Status: DISPENSED
Start: 2018-10-06 | End: 2018-10-07

## 2018-10-06 RX ORDER — FENTANYL CITRATE 50 UG/ML
100 INJECTION, SOLUTION INTRAMUSCULAR; INTRAVENOUS
Status: COMPLETED | OUTPATIENT
Start: 2018-10-06 | End: 2018-10-06

## 2018-10-06 RX ORDER — PROPOFOL 10 MG/ML
INJECTION, EMULSION INTRAVENOUS AS NEEDED
Status: DISCONTINUED | OUTPATIENT
Start: 2018-10-06 | End: 2018-10-06 | Stop reason: HOSPADM

## 2018-10-06 RX ORDER — LABETALOL HYDROCHLORIDE 5 MG/ML
5-20 INJECTION, SOLUTION INTRAVENOUS AS NEEDED
Status: DISCONTINUED | OUTPATIENT
Start: 2018-10-06 | End: 2018-10-06 | Stop reason: HOSPADM

## 2018-10-06 RX ORDER — INSULIN LISPRO 100 [IU]/ML
INJECTION, SOLUTION INTRAVENOUS; SUBCUTANEOUS EVERY 6 HOURS
Status: DISCONTINUED | OUTPATIENT
Start: 2018-10-07 | End: 2018-10-08

## 2018-10-06 RX ORDER — MIDAZOLAM HYDROCHLORIDE 1 MG/ML
INJECTION, SOLUTION INTRAMUSCULAR; INTRAVENOUS AS NEEDED
Status: DISCONTINUED | OUTPATIENT
Start: 2018-10-06 | End: 2018-10-06 | Stop reason: HOSPADM

## 2018-10-06 RX ORDER — DIPHENHYDRAMINE HYDROCHLORIDE 50 MG/ML
INJECTION, SOLUTION INTRAMUSCULAR; INTRAVENOUS AS NEEDED
Status: DISCONTINUED | OUTPATIENT
Start: 2018-10-06 | End: 2018-10-06 | Stop reason: HOSPADM

## 2018-10-06 RX ORDER — OXYCODONE AND ACETAMINOPHEN 5; 325 MG/1; MG/1
1 TABLET ORAL ONCE
Status: DISCONTINUED | OUTPATIENT
Start: 2018-10-06 | End: 2018-10-06

## 2018-10-06 RX ORDER — PREDNISONE 10 MG/1
10 TABLET ORAL
Status: DISCONTINUED | OUTPATIENT
Start: 2018-10-06 | End: 2018-10-17 | Stop reason: HOSPADM

## 2018-10-06 RX ORDER — INSULIN GLARGINE 100 [IU]/ML
15 INJECTION, SOLUTION SUBCUTANEOUS
Status: DISCONTINUED | OUTPATIENT
Start: 2018-10-06 | End: 2018-10-09

## 2018-10-06 RX ORDER — MAGNESIUM SULFATE HEPTAHYDRATE 40 MG/ML
2 INJECTION, SOLUTION INTRAVENOUS ONCE
Status: COMPLETED | OUTPATIENT
Start: 2018-10-06 | End: 2018-10-06

## 2018-10-06 RX ORDER — MAGNESIUM SULFATE 100 %
16 CRYSTALS MISCELLANEOUS AS NEEDED
Status: DISCONTINUED | OUTPATIENT
Start: 2018-10-06 | End: 2018-10-17 | Stop reason: HOSPADM

## 2018-10-06 RX ORDER — SODIUM CHLORIDE 9 MG/ML
100 INJECTION, SOLUTION INTRAVENOUS CONTINUOUS
Status: DISCONTINUED | OUTPATIENT
Start: 2018-10-06 | End: 2018-10-06

## 2018-10-06 RX ORDER — SODIUM CHLORIDE 0.9 % (FLUSH) 0.9 %
5-10 SYRINGE (ML) INJECTION EVERY 8 HOURS
Status: DISCONTINUED | OUTPATIENT
Start: 2018-10-06 | End: 2018-10-13

## 2018-10-06 RX ORDER — LABETALOL HYDROCHLORIDE 5 MG/ML
5-20 INJECTION, SOLUTION INTRAVENOUS AS NEEDED
Status: DISCONTINUED | OUTPATIENT
Start: 2018-10-06 | End: 2018-10-06 | Stop reason: SDUPTHER

## 2018-10-06 RX ORDER — HEPARIN SODIUM 5000 [USP'U]/ML
5000 INJECTION, SOLUTION INTRAVENOUS; SUBCUTANEOUS EVERY 8 HOURS
Status: DISCONTINUED | OUTPATIENT
Start: 2018-10-06 | End: 2018-10-17 | Stop reason: HOSPADM

## 2018-10-06 RX ORDER — DEXTROSE MONOHYDRATE 25 G/50ML
25-50 INJECTION, SOLUTION INTRAVENOUS AS NEEDED
Status: DISCONTINUED | OUTPATIENT
Start: 2018-10-06 | End: 2018-10-17 | Stop reason: HOSPADM

## 2018-10-06 RX ORDER — SODIUM CHLORIDE, SODIUM LACTATE, POTASSIUM CHLORIDE, CALCIUM CHLORIDE 600; 310; 30; 20 MG/100ML; MG/100ML; MG/100ML; MG/100ML
125 INJECTION, SOLUTION INTRAVENOUS CONTINUOUS
Status: DISCONTINUED | OUTPATIENT
Start: 2018-10-06 | End: 2018-10-06 | Stop reason: HOSPADM

## 2018-10-06 RX ORDER — ONDANSETRON 2 MG/ML
4 INJECTION INTRAMUSCULAR; INTRAVENOUS
Status: DISCONTINUED | OUTPATIENT
Start: 2018-10-06 | End: 2018-10-17 | Stop reason: HOSPADM

## 2018-10-06 RX ORDER — POTASSIUM CHLORIDE 20 MEQ/1
40 TABLET, EXTENDED RELEASE ORAL
Status: COMPLETED | OUTPATIENT
Start: 2018-10-06 | End: 2018-10-06

## 2018-10-06 RX ORDER — SODIUM CHLORIDE 0.9 % (FLUSH) 0.9 %
5-10 SYRINGE (ML) INJECTION AS NEEDED
Status: DISCONTINUED | OUTPATIENT
Start: 2018-10-06 | End: 2018-10-06 | Stop reason: HOSPADM

## 2018-10-06 RX ORDER — OXYCODONE AND ACETAMINOPHEN 5; 325 MG/1; MG/1
2 TABLET ORAL
Status: DISCONTINUED | OUTPATIENT
Start: 2018-10-06 | End: 2018-10-17 | Stop reason: HOSPADM

## 2018-10-06 RX ORDER — POTASSIUM CHLORIDE 7.45 MG/ML
10 INJECTION INTRAVENOUS
Status: COMPLETED | OUTPATIENT
Start: 2018-10-06 | End: 2018-10-06

## 2018-10-06 RX ORDER — MAGNESIUM SULFATE 100 %
4 CRYSTALS MISCELLANEOUS AS NEEDED
Status: DISCONTINUED | OUTPATIENT
Start: 2018-10-06 | End: 2018-10-06 | Stop reason: HOSPADM

## 2018-10-06 RX ORDER — METOPROLOL TARTRATE 50 MG/1
50 TABLET ORAL EVERY 12 HOURS
Status: DISCONTINUED | OUTPATIENT
Start: 2018-10-06 | End: 2018-10-11

## 2018-10-06 RX ORDER — INSULIN LISPRO 100 [IU]/ML
INJECTION, SOLUTION INTRAVENOUS; SUBCUTANEOUS ONCE
Status: DISCONTINUED | OUTPATIENT
Start: 2018-10-06 | End: 2018-10-06 | Stop reason: HOSPADM

## 2018-10-06 RX ORDER — DEXTROSE MONOHYDRATE 25 G/50ML
25-50 INJECTION, SOLUTION INTRAVENOUS AS NEEDED
Status: DISCONTINUED | OUTPATIENT
Start: 2018-10-06 | End: 2018-10-06 | Stop reason: HOSPADM

## 2018-10-06 RX ORDER — SODIUM CHLORIDE 9 MG/ML
100 INJECTION, SOLUTION INTRAVENOUS CONTINUOUS
Status: DISCONTINUED | OUTPATIENT
Start: 2018-10-06 | End: 2018-10-08

## 2018-10-06 RX ORDER — NALOXONE HYDROCHLORIDE 0.4 MG/ML
0.04 INJECTION, SOLUTION INTRAMUSCULAR; INTRAVENOUS; SUBCUTANEOUS AS NEEDED
Status: DISCONTINUED | OUTPATIENT
Start: 2018-10-06 | End: 2018-10-06 | Stop reason: HOSPADM

## 2018-10-06 RX ORDER — HYDRALAZINE HYDROCHLORIDE 20 MG/ML
10 INJECTION INTRAMUSCULAR; INTRAVENOUS
Status: DISCONTINUED | OUTPATIENT
Start: 2018-10-06 | End: 2018-10-17 | Stop reason: HOSPADM

## 2018-10-06 RX ORDER — ACETAMINOPHEN 325 MG/1
650 TABLET ORAL
Status: DISCONTINUED | OUTPATIENT
Start: 2018-10-06 | End: 2018-10-17 | Stop reason: HOSPADM

## 2018-10-06 RX ORDER — SODIUM CHLORIDE 0.9 % (FLUSH) 0.9 %
5-10 SYRINGE (ML) INJECTION AS NEEDED
Status: DISCONTINUED | OUTPATIENT
Start: 2018-10-06 | End: 2018-10-17 | Stop reason: HOSPADM

## 2018-10-06 RX ADMIN — INSULIN GLARGINE 15 UNITS: 100 INJECTION, SOLUTION SUBCUTANEOUS at 22:13

## 2018-10-06 RX ADMIN — PREDNISONE 10 MG: 5 TABLET ORAL at 07:57

## 2018-10-06 RX ADMIN — POTASSIUM CHLORIDE 10 MEQ: 7.46 INJECTION, SOLUTION INTRAVENOUS at 21:46

## 2018-10-06 RX ADMIN — INSULIN LISPRO 9 UNITS: 100 INJECTION, SOLUTION INTRAVENOUS; SUBCUTANEOUS at 23:49

## 2018-10-06 RX ADMIN — Medication 10 ML: at 16:00

## 2018-10-06 RX ADMIN — VANCOMYCIN HYDROCHLORIDE 1500 MG: 10 INJECTION, POWDER, LYOPHILIZED, FOR SOLUTION INTRAVENOUS at 23:50

## 2018-10-06 RX ADMIN — SODIUM CHLORIDE 100 ML/HR: 900 INJECTION, SOLUTION INTRAVENOUS at 15:23

## 2018-10-06 RX ADMIN — VANCOMYCIN HYDROCHLORIDE 1500 MG: 10 INJECTION, POWDER, LYOPHILIZED, FOR SOLUTION INTRAVENOUS at 16:20

## 2018-10-06 RX ADMIN — METOPROLOL TARTRATE 50 MG: 50 TABLET ORAL at 19:30

## 2018-10-06 RX ADMIN — PIPERACILLIN SODIUM,TAZOBACTAM SODIUM 4.5 G: 4; .5 INJECTION, POWDER, FOR SOLUTION INTRAVENOUS at 20:36

## 2018-10-06 RX ADMIN — MAGNESIUM SULFATE HEPTAHYDRATE 2 G: 40 INJECTION, SOLUTION INTRAVENOUS at 00:52

## 2018-10-06 RX ADMIN — POTASSIUM CHLORIDE 40 MEQ: 20 TABLET, EXTENDED RELEASE ORAL at 19:30

## 2018-10-06 RX ADMIN — DIPHENHYDRAMINE HYDROCHLORIDE 25 MG: 50 INJECTION, SOLUTION INTRAMUSCULAR; INTRAVENOUS at 11:40

## 2018-10-06 RX ADMIN — Medication 10 ML: at 22:51

## 2018-10-06 RX ADMIN — PIPERACILLIN SODIUM,TAZOBACTAM SODIUM 4.5 G: 4; .5 INJECTION, POWDER, FOR SOLUTION INTRAVENOUS at 15:24

## 2018-10-06 RX ADMIN — LABETALOL HYDROCHLORIDE 10 MG: 5 INJECTION, SOLUTION INTRAVENOUS at 13:33

## 2018-10-06 RX ADMIN — POTASSIUM CHLORIDE 10 MEQ: 7.46 INJECTION, SOLUTION INTRAVENOUS at 20:36

## 2018-10-06 RX ADMIN — PROPOFOL 10 MG: 10 INJECTION, EMULSION INTRAVENOUS at 11:54

## 2018-10-06 RX ADMIN — SODIUM CHLORIDE 100 ML/HR: 900 INJECTION, SOLUTION INTRAVENOUS at 03:19

## 2018-10-06 RX ADMIN — PROPOFOL 10 MG: 10 INJECTION, EMULSION INTRAVENOUS at 12:07

## 2018-10-06 RX ADMIN — HEPARIN SODIUM 5000 UNITS: 5000 INJECTION INTRAVENOUS; SUBCUTANEOUS at 22:13

## 2018-10-06 RX ADMIN — INSULIN LISPRO 4 UNITS: 100 INJECTION, SOLUTION INTRAVENOUS; SUBCUTANEOUS at 13:11

## 2018-10-06 RX ADMIN — PROPOFOL 10 MG: 10 INJECTION, EMULSION INTRAVENOUS at 12:02

## 2018-10-06 RX ADMIN — FENTANYL CITRATE 100 MCG: 50 INJECTION, SOLUTION INTRAMUSCULAR; INTRAVENOUS at 00:48

## 2018-10-06 RX ADMIN — PROPOFOL 10 MG: 10 INJECTION, EMULSION INTRAVENOUS at 11:58

## 2018-10-06 RX ADMIN — POTASSIUM CHLORIDE 10 MEQ: 7.46 INJECTION, SOLUTION INTRAVENOUS at 19:30

## 2018-10-06 RX ADMIN — POTASSIUM CHLORIDE 10 MEQ: 7.46 INJECTION, SOLUTION INTRAVENOUS at 22:51

## 2018-10-06 RX ADMIN — MORPHINE SULFATE 2 MG: 4 INJECTION INTRAVENOUS at 08:05

## 2018-10-06 RX ADMIN — INSULIN LISPRO 8 UNITS: 100 INJECTION, SOLUTION INTRAVENOUS; SUBCUTANEOUS at 17:56

## 2018-10-06 RX ADMIN — PIPERACILLIN SODIUM,TAZOBACTAM SODIUM 4.5 G: 4; .5 INJECTION, POWDER, FOR SOLUTION INTRAVENOUS at 07:57

## 2018-10-06 RX ADMIN — MIDAZOLAM HYDROCHLORIDE 2 MG: 1 INJECTION, SOLUTION INTRAMUSCULAR; INTRAVENOUS at 11:40

## 2018-10-06 RX ADMIN — MAGNESIUM SULFATE HEPTAHYDRATE 2 G: 40 INJECTION, SOLUTION INTRAVENOUS at 02:18

## 2018-10-06 RX ADMIN — HEPARIN SODIUM 5000 UNITS: 5000 INJECTION INTRAVENOUS; SUBCUTANEOUS at 15:24

## 2018-10-06 NOTE — PROGRESS NOTES
Pharmacy Dosing Services: Vancomycin Indication: Diabetic Foot Infection Day of therapy: 0 Other Antimicrobials (Include dose, start day & day of therapy): 
Piperacillin-Tazobactam 4.5 grams every 6 hours, 10- Loading dose (date given): 2,000 mg 
Current Maintenance dose: New start Goal Vancomycin Level: 15-20 mcg/mL (Trough 15-20 for most infections, 20 for meningitis/osteomyelitis, pre-HD level ~25) Vancomycin Level (if drawn):  
New start Significant Cultures:  
Blood culture taken Renal function stable? (unstable defined as SCr increase of 0.5 mg/dL or > 50% increase from baseline, whichever is greater) (Y/N): Y  
 
CAPD, Hemodialysis or Renal Replacement Therapy (Y/N): N Recent Labs 10/05/18 
 192 CREA  0.84 BUN  7 WBC  14.9* Temp (24hrs), Av.7 °F (39.3 °C), Min:102.7 °F (39.3 °C), Max:102.7 °F (39.3 °C) Creatinine Clearance (Creatinine Clearance (ml/min)): Estimated Creatinine Clearance: 125.3 mL/min (based on Cr of 0.84). Regimen assessment: New start Diabetic foot infection Maintenance dose: 1,500 mg every 8 hours Next scheduled level: 10- at 0330 Pharmacy will follow daily and adjust medications as appropriate for renal function and/or serum levels. Thank you, Darwin Brown

## 2018-10-06 NOTE — BRIEF OP NOTE
BRIEF OPERATIVE NOTE Date of Procedure: 10/6/2018 Preoperative Diagnosis: second toe Postoperative Diagnosis: * No post-op diagnosis entered * Procedure(s): 
amputation second toe Surgeon(s) and Role: 
   * Ana Shin DPM - Primary Surgical Assistant: n Surgical Staff: 
Circ-1: Josh Kee RN Scrub Tech-1: Aydee Morales Surg Asst-1: Hilda Johnson No case tracking events are documented in the log. Anesthesia: MAC Estimated Blood Loss: minimal 
Specimens: * No specimens in log * Findings: Purulent drainage and abscess present to the second digit. Second metatarsal head appears clean, viable and healthy. Clean margin amputation Complications: none Implants: * No implants in log *

## 2018-10-06 NOTE — CONSULTS
Podiatry History and Physical    Subjective: Patient is a 41year old male with a pMHx of DM II, HTN, sarcoidosis, a fib HLDP, and GERD who presents with sepsis to the [facility] Emergency room. He states that he has had an infection in the right foot for approx one month. He states that he was seen at the [facility] and they wanted to admit him for further work up and IV abx, however he refused. He states that since then the area has slowly gotten worse. He admits to having an increase in swelling and drainage from the area. He also admits to a current, every day smoker. He has no other pedal complaints at this time. Date of Consultation:  October 6, 2018    Referring Physician: Dr. Belkis Mackay    Patient Active Problem List    Diagnosis Date Noted    Diabetic foot ([facility] 10/05/2018    Sarcoidosis 05/13/2016    DKA, type 1 ([facility] 05/13/2016    [facility]2016     Past Medical History:   Diagnosis Date    Diabetes ([facility]     Hypercholesteremia     Myocardial infarct ([facility]     Sarcoidosis       History reviewed. No pertinent family history. Social History   Substance Use Topics    Smoking status: Current Every Day Smoker     Packs/day: 0.50    Smokeless tobacco: Never Used    Alcohol use No     Past Surgical History:   Procedure Laterality Date    HX CORONARY STENT PLACEMENT      HX HEART CATHETERIZATION        Prior to Admission medications    Medication Sig Start Date End Date Taking? Authorizing Provider   metroNIDAZOLE (FLAGYL) 500 mg tablet Take 1 Tab by mouth three (3) times daily. 5/16/16   Molly Thornton MD   ciprofloxacin HCl (CIPRO) 250 mg tablet Take 1 Tab by mouth every twelve (12) hours. 5/16/16   Molly Thornton MD   insulin detemir (LEVEMIR) 100 unit/mL injection 0.45 mL by SubCUTAneous route nightly. 5/16/16   Molly Thornton MD   gemfibrozil (LOPID) 600 mg tablet Take 600 mg by mouth two (2) times a day. Nery Olivera MD   GABAPENTIN PO Take  by mouth.     Nery Olivera MD albuterol (PROVENTIL HFA, VENTOLIN HFA, PROAIR HFA) 90 mcg/actuation inhaler Take 2 Puffs by inhalation every four (4) hours as needed for Wheezing. Historical Provider   dextran 70-hypromellose (ARTIFICIAL TEARS) ophthalmic solution Administer 2 Drops to both eyes as needed. Historical Provider   glipiZIDE (GLUCOTROL) 10 mg tablet Take 10 mg by mouth two (2) times a day. Indications: TYPE 2 DIABETES MELLITUS    Historical Provider   metoprolol tartrate (LOPRESSOR) 50 mg tablet Take 50 mg by mouth two (2) times a day. Historical Provider   omeprazole (PRILOSEC) 20 mg capsule Take 20 mg by mouth daily. Historical Provider   predniSONE (DELTASONE) 10 mg tablet Take 25 mg by mouth daily (with breakfast). Indications: SARCOIDOSIS    Historical Provider   venlafaxine-SR [facility] 75 mg capsule Take  by mouth daily. Indications: POST TRAUMATIC STRESS DISORDER    Historical Provider   sildenafil citrate (VIAGRA) 100 mg tablet Take 100 mg by mouth as needed. Historical Provider   aspirin (ASPIRIN) 325 mg tablet Take 325 mg by mouth daily.     Historical Provider     No Known Allergies     Review of Systems:    Constitutional: negative  Eyes: negative  Ears, nose, mouth, throat, and face: negative  Respiratory: negative  Cardiovascular: negative  Gastrointestinal: negative  Hematologic/lymphatic: negative  Musculoskeletal:negative  Neurological: negative  Endocrine: negative      Objective:     Patient Vitals for the past 8 hrs:   BP Pulse Resp SpO2   10/06/18 0945 (!) 150/98 98 11 100 %   10/06/18 0845 156/87 99 17 100 %   10/06/18 0715 148/78 97 13 100 %     Temp (24hrs), Av.7 °F (39.3 °C), Min:102.7 °F (39.3 °C), Max:102.7 °F (39.3 °C)      Data Review:   Recent Results (from the past 24 hour(s))   GLUCOSE, POC    Collection Time: 10/05/18  7:08 PM   Result Value Ref Range    Glucose (POC) 292 (H) 70 - 110 mg/dL   CULTURE, BLOOD    Collection Time: 10/05/18  7:20 PM   Result Value Ref Range Special Requests: PERIPHERAL      Culture result: NO GROWTH AFTER 12 HOURS     METABOLIC PANEL, COMPREHENSIVE    Collection Time: 10/05/18  7:20 PM   Result Value Ref Range    Sodium 131 (L) 136 - 145 mmol/L    Potassium 3.0 (L) 3.5 - 5.5 mmol/L    Chloride 94 (L) 100 - 108 mmol/L    CO2 22 21 - 32 mmol/L    Anion gap 15 3.0 - 18 mmol/L    Glucose 258 (H) 74 - 99 mg/dL    BUN 7 7.0 - 18 MG/DL    Creatinine 0.84 0.6 - 1.3 MG/DL    BUN/Creatinine ratio 8 (L) 12 - 20      GFR est AA >60 >60 ml/min/1.73m2    GFR est non-AA >60 >60 ml/min/1.73m2    Calcium 8.2 (L) 8.5 - 10.1 MG/DL    Bilirubin, total 1.7 (H) 0.2 - 1.0 MG/DL    ALT (SGPT) 57 16 - 61 U/L    AST (SGOT) 29 15 - 37 U/L    Alk. phosphatase 313 (H) 45 - 117 U/L    Protein, total 7.4 6.4 - 8.2 g/dL    Albumin 2.6 (L) 3.4 - 5.0 g/dL    Globulin 4.8 (H) 2.0 - 4.0 g/dL    A-G Ratio 0.5 (L) 0.8 - 1.7     CBC WITH AUTOMATED DIFF    Collection Time: 10/05/18  7:20 PM   Result Value Ref Range    WBC 14.9 (H) 4.6 - 13.2 K/uL    RBC 4.55 (L) 4.70 - 5.50 M/uL    HGB 13.6 13.0 - 16.0 g/dL    HCT 38.2 36.0 - 48.0 %    MCV 84.0 74.0 - 97.0 FL    MCH 29.9 24.0 - 34.0 PG    MCHC 35.6 31.0 - 37.0 g/dL    RDW 13.2 11.6 - 14.5 %    PLATELET 739 567 - 979 K/uL    MPV 10.4 9.2 - 11.8 FL    NEUTROPHILS 88 (H) 40 - 73 %    LYMPHOCYTES 10 (L) 21 - 52 %    MONOCYTES 2 (L) 3 - 10 %    EOSINOPHILS 0 0 - 5 %    BASOPHILS 0 0 - 2 %    ABS. NEUTROPHILS 13.2 (H) 1.8 - 8.0 K/UL    ABS. LYMPHOCYTES 1.4 0.9 - 3.6 K/UL    ABS. MONOCYTES 0.2 0.05 - 1.2 K/UL    ABS. EOSINOPHILS 0.0 0.0 - 0.4 K/UL    ABS.  BASOPHILS 0.0 0.0 - 0.1 K/UL    DF AUTOMATED     TYPE & SCREEN    Collection Time: 10/05/18  7:20 PM   Result Value Ref Range    Crossmatch Expiration 10/08/2018     ABO/Rh(D) Russ Ponce POSITIVE     Antibody screen NEG    PROTHROMBIN TIME + INR    Collection Time: 10/05/18  7:20 PM   Result Value Ref Range    Prothrombin time 13.4 11.5 - 15.2 sec    INR 1.1 0.8 - 1.2     SED RATE (ESR)    Collection Time: 10/05/18  7:20 PM   Result Value Ref Range    Sed rate, automated 39 (H) 0 - 15 mm/hr   CRP, HIGH SENSITIVITY    Collection Time: 10/05/18  7:20 PM   Result Value Ref Range    C-Reactive Protein, Cardiac 263.07 (H) 0.00 - 3.00 mg/L   HEMOGLOBIN A1C WITH EAG    Collection Time: 10/05/18  7:20 PM   Result Value Ref Range    Hemoglobin A1c 9.6 (H) 4.2 - 5.6 %    Est. average glucose 229 mg/dL   TROPONIN I    Collection Time: 10/05/18  7:20 PM   Result Value Ref Range    Troponin-I, Qt. <0.02 0.0 - 0.045 NG/ML   MAGNESIUM    Collection Time: 10/05/18  7:20 PM   Result Value Ref Range    Magnesium 1.2 (L) 1.6 - 2.6 mg/dL   POC LACTIC ACID    Collection Time: 10/05/18  7:27 PM   Result Value Ref Range    Lactic Acid (POC) 4.5 (HH) 0.4 - 2.0 mmol/L   CULTURE, BLOOD    Collection Time: 10/05/18  7:31 PM   Result Value Ref Range    Special Requests: PERIPHERAL      Culture result: NO GROWTH AFTER 12 HOURS     EKG, 12 LEAD, INITIAL    Collection Time: 10/05/18  7:31 PM   Result Value Ref Range    Ventricular Rate 126 BPM    Atrial Rate 126 BPM    P-R Interval 122 ms    QRS Duration 90 ms    Q-T Interval 344 ms    QTC Calculation (Bezet) 498 ms    Calculated P Axis 63 degrees    Calculated R Axis 65 degrees    Calculated T Axis -96 degrees    Diagnosis       Sinus tachycardia  Possible Left atrial enlargement  Marked ST abnormality, possible inferior subendocardial injury  Abnormal ECG  When compared with ECG of 13-MAY-2016 22:10,  No significant change was found     URINALYSIS W/ RFLX MICROSCOPIC    Collection Time: 10/05/18  9:20 PM   Result Value Ref Range    Color YELLOW      Appearance CLEAR      Specific gravity 1.011 1.005 - 1.030      pH (UA) 5.5 5.0 - 8.0      Protein TRACE (A) NEG mg/dL    Glucose >1000 (A) NEG mg/dL    Ketone NEGATIVE  NEG mg/dL    Bilirubin NEGATIVE  NEG      Blood NEGATIVE  NEG      Urobilinogen 2.0 (H) 0.2 - 1.0 EU/dL    Nitrites NEGATIVE  NEG      Leukocyte Esterase NEGATIVE  NEG     URINE MICROSCOPIC ONLY    Collection Time: 10/05/18  9:20 PM   Result Value Ref Range    WBC 0 to 3 0 - 4 /hpf    RBC 0 to 3 0 - 5 /hpf    Epithelial cells FEW 0 - 5 /lpf    Bacteria 1+ (A) NEG /hpf    Mucus 1+ (A) NEG /lpf   GLUCOSE, POC    Collection Time: 10/06/18  1:06 AM   Result Value Ref Range    Glucose (POC) 210 (H) 70 - 110 mg/dL   POC LACTIC ACID    Collection Time: 10/06/18  1:10 AM   Result Value Ref Range    Lactic Acid (POC) 0.7 0.4 - 2.0 mmol/L   CBC WITH AUTOMATED DIFF    Collection Time: 10/06/18  4:34 AM   Result Value Ref Range    WBC 10.4 4.6 - 13.2 K/uL    RBC 3.49 (L) 4.70 - 5.50 M/uL    HGB 10.1 (L) 13.0 - 16.0 g/dL    HCT 29.2 (L) 36.0 - 48.0 %    MCV 83.7 74.0 - 97.0 FL    MCH 28.9 24.0 - 34.0 PG    MCHC 34.6 31.0 - 37.0 g/dL    RDW 13.2 11.6 - 14.5 %    PLATELET 951 (L) 886 - 420 K/uL    MPV 10.3 9.2 - 11.8 FL    NEUTROPHILS 85 (H) 40 - 73 %    LYMPHOCYTES 5 (L) 21 - 52 %    MONOCYTES 9 3 - 10 %    EOSINOPHILS 1 0 - 5 %    BASOPHILS 0 0 - 2 %    ABS. NEUTROPHILS 8.8 (H) 1.8 - 8.0 K/UL    ABS. LYMPHOCYTES 0.5 (L) 0.9 - 3.6 K/UL    ABS. MONOCYTES 0.9 0.05 - 1.2 K/UL    ABS. EOSINOPHILS 0.1 0.0 - 0.4 K/UL    ABS. BASOPHILS 0.0 0.0 - 0.1 K/UL    DF AUTOMATED     METABOLIC PANEL, COMPREHENSIVE    Collection Time: 10/06/18  4:34 AM   Result Value Ref Range    Sodium 137 136 - 145 mmol/L    Potassium 3.0 (L) 3.5 - 5.5 mmol/L    Chloride 106 100 - 108 mmol/L    CO2 23 21 - 32 mmol/L    Anion gap 8 3.0 - 18 mmol/L    Glucose 216 (H) 74 - 99 mg/dL    BUN 5 (L) 7.0 - 18 MG/DL    Creatinine 0.48 (L) 0.6 - 1.3 MG/DL    BUN/Creatinine ratio 10 (L) 12 - 20      GFR est AA >60 >60 ml/min/1.73m2    GFR est non-AA >60 >60 ml/min/1.73m2    Calcium 6.6 (L) 8.5 - 10.1 MG/DL    Bilirubin, total 1.4 (H) 0.2 - 1.0 MG/DL    ALT (SGPT) 39 16 - 61 U/L    AST (SGOT) 22 15 - 37 U/L    Alk.  phosphatase 204 (H) 45 - 117 U/L    Protein, total 5.0 (L) 6.4 - 8.2 g/dL    Albumin 1.7 (L) 3.4 - 5.0 g/dL    Globulin 3.3 2.0 - 4.0 g/dL    A-G Ratio 0.5 (L) 0.8 - 1.7     LIPID PANEL    Collection Time: 10/06/18  4:34 AM   Result Value Ref Range    LIPID PROFILE          Cholesterol, total 115 <200 MG/DL    Triglyceride 124 <150 MG/DL    HDL Cholesterol 25 (L) 40 - 60 MG/DL    LDL, calculated 65.2 0 - 100 MG/DL    VLDL, calculated 24.8 MG/DL    CHOL/HDL Ratio 4.6 0 - 5.0     PTT    Collection Time: 10/06/18  4:34 AM   Result Value Ref Range    aPTT 46.4 (H) 23.0 - 36.4 SEC     Foot Exam:  Vascular:   DP/PT pulses weakly palpable. CFT to digits b/l less than three seconds. Skin temp warm to cool from proximal to distal.     Neuro:   epicritic sensation intact    Derm:    Right foot second digit presents with diffuse edema and erythema present to the entire digit extending proximally past the level of the MPJ. No crepitus upon palpation. Draiange and malodor present. Distal aspect of the digit with discoloration present extending proximally to the level of the DIPJ. Large open ulcer to the dorsal aspect of the digit which probes to bone. Purulent draiange present from the wound. Musculoskeltal:   no gross deformities noted. Impression:   DM type II  Actue Osteomyelitis  Cellulitis right foot    Recommendation:   Patient seen this morning on rounds  Labs and x rays reviewed  Discussed in depth with the patient the dx and treatment options. We discussed that without surgical intervention the infection could possibly spread further up the leg. Patient demonstrates understanding. Patient scheduled for OR this afternoon for right foot second digit amputation and wash out  Will plan to take intraop cultures. Continue [address] for now with betadine wet to dry dressing. Continue NPO for now  All risks and benefits were discussed with the patient and all questions answered. No guarantees were made as to the outcome of the procedure. Patient is aware that they are at an increase risk for further limb loss.    Continue abx for now  Thank for you the opportunity for us to assist with the care of this patient. Greater than 30 minutes was spent face to face with the patient.

## 2018-10-06 NOTE — ANESTHESIA POSTPROCEDURE EVALUATION
Post-Anesthesia Evaluation & Assessment Visit Vitals  BP (!) 145/91  Pulse 94  Temp 37.1 °C (98.7 °F)  Resp 12  Ht 6' 2\" (1.88 m)  Wt 79.8 kg (176 lb)  SpO2 100%  BMI 22.6 kg/m2 Nausea/Vomiting: no nausea Pain score (VAS): 0 Post-operative hydration adequate. Mental status & Level of consciousness: orientation per pre-anesthetic level Neurological status: moves all extremities, sensation grossly intact Pulmonary status: airway patent, no supplemental oxygen required Complications related to anesthesia: none Additional comments: PAtient to go to regular floor, will be reevaluated by Dr. Bisi Velez in PACU due to bed availability issue per nursing supervisor John Cantu CRNA October 6, 2018

## 2018-10-06 NOTE — ANESTHESIA PREPROCEDURE EVALUATION
Anesthetic History No history of anesthetic complications Review of Systems / Medical History Patient summary reviewed, nursing notes reviewed and pertinent labs reviewed Pulmonary Within defined limits Neuro/Psych Within defined limits Cardiovascular CAD Exercise tolerance: <4 METS Comments: Gangrene of R leg causing sepsis GI/Hepatic/Renal 
  
 
 
 
 
 
 Endo/Other Diabetes: poorly controlled, using insulin Other Findings Physical Exam 
 
Airway Mallampati: II 
TM Distance: 4 - 6 cm Neck ROM: normal range of motion Cardiovascular Rhythm: regular Rate: normal 
 
 
 
 Dental 
 
 
  
Pulmonary Breath sounds clear to auscultation Abdominal 
GI exam deferred Other Findings Anesthetic Plan ASA: 4, emergent Anesthesia type: MAC and regional 
 
 
 
 
 
Anesthetic plan and risks discussed with: Patient

## 2018-10-06 NOTE — PERIOP NOTES
Patient transferred to [facility] in good condition per patient no family to update

## 2018-10-06 NOTE — H&P
[address] [place], [place] [address] History & Physical 
Patient: Shahrzad Kumar MRN: [id number]  CSN: [id number] YOB: 1970  Age: 41 y.o. Sex: male DOA: 10/5/2018 LOS:  LOS: 0 days DOA: 10/5/2018 Assessment/Plan Active Problems: 
  Sepsis ([facility] (5/13/2016) [facility] (10/5/2018) Plan: 1. Sepsis - IVF, IV Abx 2. Diabetic R foot with infn - IVF , IV Abx - Podiatry consulted - to OR in AM  
3. H/o A fib - rate elevated 2y to sepsis - Hold AC for now 4. H/o Sarcoidosis - hold prednisone 5. T2DM - insulin SS - monitor BS 6. HTN - BP initially elevated - now low - hold all meds - IVF 7. Chronic Pain - IV Morphine prn DVT Px - Heparin FC   
 
 
 
 
 
 
 
 
HPI:  
 
Shahrzad Kumar is a 41 y.o. male who is being admitted for sepsis 2y to R diabetic foot Pt mentions he usually f/u at the [facility] PMHx - T2DM uncontrolled, HTN , non compliance with meds, Sarcoidosis , A fib, HLPD , GERD Pt mentions he has had the infn in the R foot for > 1 month - went to see [facility] - was advised to be admitted for IV Abx but pt refused - he said he would care for it himself He comes in today because foot looks worse - hasnt been healing & is slowly getting worse , 2nd toe looks infected with large ulcer on it ER eval - pt noted to be septic Started on IV Abx Will admit for further eval  
 
 
Past Medical History:  
Diagnosis Date  [facility]  Hypercholesteremia  [facility]ocardial infarct ([facility]  Sarcoidosis Past Surgical History:  
Procedure Laterality Date  HX CORONARY STENT PLACEMENT    
 HX HEART CATHETERIZATION History reviewed. No pertinent family history. Social History Social History  Marital status:  Spouse name: N/A  
 Number of children: N/A  
 Years of education: N/A Social History Main Topics  Smoking status: Current Every Day Smoker Packs/day: 0.50  Smokeless tobacco: Never Used  Alcohol use No  
 Drug use: No  
 Sexual activity: Not Asked Other Topics Concern  None Social History Narrative Prior to Admission medications Medication Sig Start Date End Date Taking? Authorizing Provider  
metroNIDAZOLE (FLAGYL) 500 mg tablet Take 1 Tab by mouth three (3) times daily. 5/16/16   Bibi Greene MD  
ciprofloxacin HCl (CIPRO) 250 mg tablet Take 1 Tab by mouth every twelve (12) hours. 5/16/16   Bibi Greene MD  
insulin detemir (LEVEMIR) 100 unit/mL injection 0.45 mL by SubCUTAneous route nightly. 5/16/16   Bibi Greene MD  
gemfibrozil (LOPID) 600 mg tablet Take 600 mg by mouth two (2) times a day. Nery Olivera MD  
GABAPENTIN PO Take  by mouth. Nery Olivera MD  
albuterol (PROVENTIL HFA, VENTOLIN HFA, PROAIR HFA) 90 mcg/actuation inhaler Take 2 Puffs by inhalation every four (4) hours as needed for Wheezing. Historical Provider  
dextran 70-hypromellose (ARTIFICIAL TEARS) ophthalmic solution Administer 2 Drops to both eyes as needed. Historical Provider  
glipiZIDE (GLUCOTROL) 10 mg tablet Take 10 mg by mouth two (2) times a day. Indications: TYPE 2 DIABETES MELLITUS    Historical Provider  
metoprolol tartrate (LOPRESSOR) 50 mg tablet Take 50 mg by mouth two (2) times a day. Historical Provider  
omeprazole (PRILOSEC) 20 mg capsule Take 20 mg by mouth daily. Historical Provider  
predniSONE (DELTASONE) 10 mg tablet Take 25 mg by mouth daily (with breakfast). Indications: SARCOIDOSIS    Historical Provider  
venlafaxine-SR [facility] 75 mg capsule Take  by mouth daily. Indications: POST TRAUMATIC STRESS DISORDER    Historical Provider  
sildenafil citrate (VIAGRA) 100 mg tablet Take 100 mg by mouth as needed. Historical Provider  
aspirin (ASPIRIN) 325 mg tablet Take 325 mg by mouth daily. Historical Provider No Known Allergies Review of Systems A comprehensive review of systems was negative except for that written in the History of Present Illness. Physical Exam:  
  
Visit Vitals  /86  Pulse (!) 126  Temp (!) 102.7 °F (39.3 °C)  Resp 11  
 Ht 6' 2\" (1.88 m)  Wt 79.8 kg (176 lb)  SpO2 100%  BMI 22.6 kg/m2 Physical Exam: 
 
Gen: In general, this is a well nourished male in no acute distress HEENT: Sclerae nonicteric. Oral mucous membranes dry. Dentition poor Neck: Supple with midline trachea. CV: RRR without murmur or rub appreciated. Resp:Respirations are unlabored without use of accessory muscles. Lung fields B/L without wheezes or rhonchi. Abd: Soft, nontender, nondistended. Extrem: Extremities are warm, without cyanosis or clubbing. R foot swollen & red with streaking & 2nd digit has a large ulcer on it Skin: Warm, no visible rashes. Neuro: Patient is alert, oriented, and cooperative. No obvious focal defects. Moves all 4 extremities. Labs Reviewed: 
 
Recent Results (from the past 24 hour(s)) GLUCOSE, POC Collection Time: 10/05/18  7:08 PM  
Result Value Ref Range Glucose (POC) 292 (H) 70 - 110 mg/dL CULTURE, BLOOD Collection Time: 10/05/18  7:20 PM  
Result Value Ref Range Special Requests: PERIPHERAL Culture result: PENDING   
METABOLIC PANEL, COMPREHENSIVE Collection Time: 10/05/18  7:20 PM  
Result Value Ref Range Sodium 131 (L) 136 - 145 mmol/L Potassium 3.0 (L) 3.5 - 5.5 mmol/L Chloride 94 (L) 100 - 108 mmol/L  
 CO2 22 21 - 32 mmol/L Anion gap 15 3.0 - 18 mmol/L Glucose 258 (H) 74 - 99 mg/dL BUN 7 7.0 - 18 MG/DL Creatinine 0.84 0.6 - 1.3 MG/DL  
 BUN/Creatinine ratio 8 (L) 12 - 20 GFR est AA >60 >60 ml/min/1.73m2 GFR est non-AA >60 >60 ml/min/1.73m2 Calcium 8.2 (L) 8.5 - 10.1 MG/DL Bilirubin, total 1.7 (H) 0.2 - 1.0 MG/DL  
 ALT (SGPT) 57 16 - 61 U/L  
 AST (SGOT) 29 15 - 37 U/L Alk.  phosphatase 313 (H) 45 - 117 U/L  
 Protein, total 7.4 6.4 - 8.2 g/dL Albumin 2.6 (L) 3.4 - 5.0 g/dL Globulin 4.8 (H) 2.0 - 4.0 g/dL A-G Ratio 0.5 (L) 0.8 - 1.7    
CBC WITH AUTOMATED DIFF Collection Time: 10/05/18  7:20 PM  
Result Value Ref Range WBC 14.9 (H) 4.6 - 13.2 K/uL  
 RBC 4.55 (L) 4.70 - 5.50 M/uL  
 HGB 13.6 13.0 - 16.0 g/dL HCT 38.2 36.0 - 48.0 % MCV 84.0 74.0 - 97.0 FL  
 MCH 29.9 24.0 - 34.0 PG  
 MCHC 35.6 31.0 - 37.0 g/dL  
 RDW 13.2 11.6 - 14.5 % PLATELET 595 590 - 544 K/uL MPV 10.4 9.2 - 11.8 FL  
 NEUTROPHILS 88 (H) 40 - 73 % LYMPHOCYTES 10 (L) 21 - 52 % MONOCYTES 2 (L) 3 - 10 % EOSINOPHILS 0 0 - 5 % BASOPHILS 0 0 - 2 %  
 ABS. NEUTROPHILS 13.2 (H) 1.8 - 8.0 K/UL  
 ABS. LYMPHOCYTES 1.4 0.9 - 3.6 K/UL  
 ABS. MONOCYTES 0.2 0.05 - 1.2 K/UL  
 ABS. EOSINOPHILS 0.0 0.0 - 0.4 K/UL  
 ABS. BASOPHILS 0.0 0.0 - 0.1 K/UL  
 DF AUTOMATED    
TYPE & SCREEN Collection Time: 10/05/18  7:20 PM  
Result Value Ref Range Crossmatch Expiration 10/08/2018 ABO/Rh(D) O POSITIVE Antibody screen NEG PROTHROMBIN TIME + INR Collection Time: 10/05/18  7:20 PM  
Result Value Ref Range Prothrombin time 13.4 11.5 - 15.2 sec INR 1.1 0.8 - 1.2 POC LACTIC ACID Collection Time: 10/05/18  7:27 PM  
Result Value Ref Range Lactic Acid (POC) 4.5 (HH) 0.4 - 2.0 mmol/L  
CULTURE, BLOOD Collection Time: 10/05/18  7:31 PM  
Result Value Ref Range Special Requests: PERIPHERAL Culture result: PENDING   
EKG, 12 LEAD, INITIAL Collection Time: 10/05/18  7:31 PM  
Result Value Ref Range Ventricular Rate 126 BPM  
 Atrial Rate 126 BPM  
 P-R Interval 122 ms QRS Duration 90 ms Q-T Interval 344 ms QTC Calculation (Bezet) 498 ms Calculated P Axis 63 degrees Calculated R Axis 65 degrees Calculated T Axis -96 degrees Diagnosis Sinus tachycardia Possible Left atrial enlargement Marked ST abnormality, possible inferior subendocardial injury Abnormal ECG 
 When compared with ECG of 13-MAY-2016 22:10, No significant change was found Imaging Reviewed: 
 
X ray R foot - final report pending Kate Del Rosario MD 
10/5/2018, 8:39 PM

## 2018-10-06 NOTE — PROGRESS NOTES
Consulted for 41year old male with PMHx of DM and  Sarcoidosis. Labs and x rays reviewed. No subcutaneus gas on x rays. Order CRP and ESR- pending Start wound care with betadine paint to the area Full consult to follow

## 2018-10-06 NOTE — PROGRESS NOTES
Consent reviewed, signed and is in patients chart. All risks benefits and complications were discussed with the patient. The right lower extremity was identified and marked. All questions were answered and no guarantees were made.

## 2018-10-06 NOTE — PROGRESS NOTES
Progress Note Patient: Paula Saucedo               Sex: male          DOA: 10/5/2018 YOB: 1970      Age:  41 y.o.        LOS:  LOS: 1 day CHIEF COMPLAINT: 
 
Subjective: He feels better and denies fever, chills, CP, or SOB. Wife at bedside. Objective:  
  
Visit Vitals  /86 (BP 1 Location: Right arm, BP Patient Position: At rest;Supine)  Pulse 94  Temp 98.8 °F (37.1 °C)  Resp 16  
 Ht 6' 2\" (1.88 m)  Wt 176 lb (79.8 kg)  SpO2 100%  BMI 22.6 kg/m2 Physical Exam: 
GEN: AAO X 3, NAD 
CVS: Normal S1S2, RRR 
RESP: CTAB 
ABD: Soft, NT/ND, +BS 
EXT: Right foot wrapped in dressing NEURO: CN 2 - 12 intact with no focal deficits noted. Lab/Data Reviewed: 
CMP:  
Lab Results Component Value Date/Time  10/06/2018 04:34 AM  
 K 3.0 (L) 10/06/2018 04:34 AM  
  10/06/2018 04:34 AM  
 CO2 23 10/06/2018 04:34 AM  
 AGAP 8 10/06/2018 04:34 AM  
  (H) 10/06/2018 04:34 AM  
 BUN 5 (L) 10/06/2018 04:34 AM  
 CREA 0.48 (L) 10/06/2018 04:34 AM  
 GFRAA >60 10/06/2018 04:34 AM  
 GFRNA >60 10/06/2018 04:34 AM  
 CA 6.6 (L) 10/06/2018 04:34 AM  
 MG 1.2 (L) 10/05/2018 07:20 PM  
 ALB 1.7 (L) 10/06/2018 04:34 AM  
 TP 5.0 (L) 10/06/2018 04:34 AM  
 GLOB 3.3 10/06/2018 04:34 AM  
 AGRAT 0.5 (L) 10/06/2018 04:34 AM  
 SGOT 22 10/06/2018 04:34 AM  
 ALT 39 10/06/2018 04:34 AM  
 
CBC:  
Lab Results Component Value Date/Time WBC 10.4 10/06/2018 04:34 AM  
 HGB 10.1 (L) 10/06/2018 04:34 AM  
 HCT 29.2 (L) 10/06/2018 04:34 AM  
  (L) 10/06/2018 04:34 AM  
 
 
 
 
Assessment/Plan Active Problems: 
  Sepsis ([address] (5/13/2016) Diabetic foot ([address] (10/5/2018) Diabetic foot ulcer ([address] (10/6/2018) Septic arthritis of IP joint of toe, right ([address] (10/6/2018) Plan: 1. Sepsis - IVF, IV Vanco and Zosyn. BC currently with no growth 2.  Diabetic R foot with infn - IVF , IV Abx - Podiatry consulted and performed surgery earlier today. 3. H/o A fib - rate elevated 2y to sepsis - Hold AC for now 4. H/o Sarcoidosis - Continue prednisone 5. HTN - Metoprolol 50 mg BID started today with parameters 6. Chronic Pain - IV Morphine prn  
7. Hypokalemia. K today at 3.0. Replete and BMP in AM 
           8.  Uncontrolled DM - HBA1C 10.8. Lantus 15 units started today and continue correctional insulin. DVT Px - Heparin Major DO Bee, MPH Internal Medicine

## 2018-10-06 NOTE — PROGRESS NOTES
Bedside shift change report given to JUAN A Ahuja (oncoming nurse) by Jeannine Reyes RN (offgoing nurse). Report included the following information SBAR. Dressing to foot C/D/I, patient drowsy, denies pain. Patient reports numbness and tingling to BUE, BLE, states it was present PTA. Foot elevated to two pillows. 2004: Insulin coverage increased per protocol, dicussed blood glucose management with patient, patient does not currently check BG at home, states, \"I don't do any of that\". Patient continues to keep BLE dependent despite education regarding benefit of elevation post op, will continue to encourage. 0600: Order instructions to hold Heparin after midnight, AM dose held. Bedside shift change report given to Jeannine Reyes RN (oncoming nurse) by JUAN A Ahuja (offgoing nurse). Report included the following information SBAR.

## 2018-10-06 NOTE — ANESTHESIA PROCEDURE NOTES
Peripheral Block Start time: 10/6/2018 11:10 AM 
End time: 10/6/2018 11:14 AM 
Performed by: Reese Swain Authorized by: Reese Swain Pre-procedure: Indications: at surgeon's request and post-op pain management Preanesthetic Checklist: patient identified, risks and benefits discussed, site marked, timeout performed, anesthesia consent given and patient being monitored Timeout Time: 11:10 Block Type:  
Block Type: Adductor canal 
Laterality:  Right Monitoring:  Standard ASA monitoring, responsive to questions, continuous pulse ox, oxygen, frequent vital sign checks and heart rate Injection Technique:  Single shot Procedures: ultrasound guided Patient Position: supine Prep: chlorhexidine Location:  Mid thigh Needle Type:  Stimuplex Needle Gauge:  22 G Needle Localization:  Ultrasound guidance Medication Injected:  0.5% 
ropivacaine Volume (mL):  10 Assessment: 
Number of attempts:  1 Injection Assessment:  Incremental injection every 5 mL, no paresthesia, ultrasound image on chart, local visualized surrounding nerve on ultrasound, negative aspiration for blood and no intravascular symptoms Patient tolerance:  Patient tolerated the procedure well with no immediate complications

## 2018-10-06 NOTE — CONSULTS
[facility]  Pulmonary, Critical Care, and Sleep Medicine    Name: Joseline Damian MRN: [id number]  : 1970 Hospital: [address] [place] [address]  Date: 10/6/2018       [facility] Initial Patient Consult                                              Consult requesting physician: Dr Jennifer Woodard    Reason for Consult: Sepsis    I have reviewed the flowsheet and notes. Events, vitals, medications and notes from last 24 hours reviewed. Care plan discussed with staff    Subjective:  10/6/2018     IMPRESSION and PLAN:  Patient Active Problem List   Diagnosis Code    Sarcoidosis D86.9    DKA, type 1 ([facility] E10.10    Sepsis ([facility] A41.9    Diabetic foot ([facility] E11.8     Sepsis - Pt's repeat LA is 0.7. Cont zosyn and vanco. F/u cultures  Shock - Pt has received about 4 l fluids so far. He is only on Levophed 2mcg. Try to wean it off. Keep MAP >65mmHg  Diabetic Rt foot infection - Pt has been evaluated by Podiatry, defer mgt to them. Continue Zosyn and Vanco  DM - ISS  Sarcoidosis - Pt reports he is on Prednisone 10mg daily. Continue with it. Smoker - Pt advised to quit smoking     OTHER:  Glycemic Control. Glucose stabilizer per ICU protocol when on insulin drip. Maintain blood glucose 140-180. Replace electrolytes per ICU electrolyte replacement protocol  Sepsis bundle and protocol followed. Follow serial lactic acid when >2, fluid resuscitation. Draw cultures before antibiotic. Follow cultures. Antibiotic choice. Administer antibiotic within 1 hr ICU admission and 3 hours of ED arrival. Deescalate antibiotic when appropriate. Vasopressor when appropriate with MAP goal >65 mmHg. Skin & Wound care. PT/OT eval and treat. OOB/IS when appropriate. Further recommendations will be based on the patient's response to recommended treatment and results of the investigation ordered. Quality Care: Stress ulcer prophylaxis, DVT prophylaxis, HOB elevated, Infection control all reviewed and addressed.   Events and notes from last 24 hours reviewed. Care plan discussed with nursing. D/w patient : above medical problems and answered all questions to his satisfaction. See my orders for detail. CC TIME: >45 min   Further management depending on test results and work up as outlined above. History of Present Illness:    Armida Ramirez has been seen and evaluated in ER. Patient is a 41 y.o. male with PMHx significant for diabetes and sarcoidosis who presents to ED with c/o Rt foot infection. Pt reports he was seen at [place] about a month ago and was advised to be be admitted with iv antibiotics but pt refused. His foot has looked worse recently and hence he came to the ER. Pt also c/o myalgia, chills, sweats over the past few days    Review of Systems:   HEENT: No epistaxis, no nasal drainage  Respiratory: as above  Cardiovascular: no chest pain, no palpitations  Gastrointestinal: no abd pain, no vomiting, no diarrhea  Genitourinary: No urinary symptoms  Musculoskeletal: Rt foot edema, erythema, discharge  Neurological: No focal weakness, no seizures, no headaches  Constitutional: C/o chills    Medication Reviewed      No Known Allergies   Past Medical History:   Diagnosis Date    Diabetes ([facility]     Hypercholesteremia     Myocardial infarct ([facility]     Sarcoidosis       Past Surgical History:   Procedure Laterality Date    HX CORONARY STENT PLACEMENT      HX HEART CATHETERIZATION        Social History   Substance Use Topics    Smoking status: Current Every Day Smoker     Packs/day: 0.50    Smokeless tobacco: Never Used    Alcohol use No      History reviewed. No pertinent family history. Prior to Admission medications    Medication Sig Start Date End Date Taking? Authorizing Provider   metroNIDAZOLE (FLAGYL) 500 mg tablet Take 1 Tab by mouth three (3) times daily. 5/16/16   Sara Simpson MD   ciprofloxacin HCl (CIPRO) 250 mg tablet Take 1 Tab by mouth every twelve (12) hours.  5/16/16   Sara Simpson MD insulin detemir (LEVEMIR) 100 unit/mL injection 0.45 mL by SubCUTAneous route nightly. 5/16/16   Sandhya Barba MD   gemfibrozil (LOPID) 600 mg tablet Take 600 mg by mouth two (2) times a day. Nery Olivera MD   GABAPENTIN PO Take  by mouth. Nery Olivera MD   albuterol (PROVENTIL HFA, VENTOLIN HFA, PROAIR HFA) 90 mcg/actuation inhaler Take 2 Puffs by inhalation every four (4) hours as needed for Wheezing. Historical Provider   dextran 70-hypromellose (ARTIFICIAL TEARS) ophthalmic solution Administer 2 Drops to both eyes as needed. Historical Provider   glipiZIDE (GLUCOTROL) 10 mg tablet Take 10 mg by mouth two (2) times a day. Indications: TYPE 2 DIABETES MELLITUS    Historical Provider   metoprolol tartrate (LOPRESSOR) 50 mg tablet Take 50 mg by mouth two (2) times a day. Historical Provider   omeprazole (PRILOSEC) 20 mg capsule Take 20 mg by mouth daily. Historical Provider   predniSONE (DELTASONE) 10 mg tablet Take 25 mg by mouth daily (with breakfast). Indications: SARCOIDOSIS    Historical Provider   venlafaxine-SR [facility] 75 mg capsule Take  by mouth daily. Indications: POST TRAUMATIC STRESS DISORDER    Historical Provider   sildenafil citrate (VIAGRA) 100 mg tablet Take 100 mg by mouth as needed. Historical Provider   aspirin (ASPIRIN) 325 mg tablet Take 325 mg by mouth daily.     Historical Provider     Current Facility-Administered Medications   Medication Dose Route Frequency    magnesium sulfate 2 g/50 ml IVPB (premix or compounded)  2 g IntraVENous ONCE    piperacillin-tazobactam (ZOSYN) 4.5 g in 0.9% sodium chloride (MBP/ADV) 100 mL MBP  4.5 g IntraVENous Q6H    VANCOMYCIN INFORMATION NOTE   Other Rx Dosing/Monitoring    insulin lispro (HUMALOG) injection   SubCUTAneous Q6H    NOREPINephrine (LEVOPHED) 8 mg in 0.9% NS 250ml infusion  2-16 mcg/min IntraVENous TITRATE    sodium chloride 0.9 % bolus infusion 1,000 mL  1,000 mL IntraVENous ONCE    sodium chloride 0.9 % bolus infusion 1,000 mL  1,000 mL IntraVENous ONCE    [START ON 10/7/2018] VANCOMYCIN INFORMATION NOTE   Other ONCE    vancomycin (VANCOCIN) 1500 mg in  ml infusion  1,500 mg IntraVENous Q8H     Peripheral Intravenous Line:  Peripheral IV 10/05/18 Left Antecubital (Active)   Site Assessment Clean, dry, & intact 10/5/2018  7:34 PM   Phlebitis Assessment 0 10/5/2018  7:34 PM   Infiltration Assessment 0 10/5/2018  7:34 PM   Dressing Status Clean, dry, & intact 10/5/2018  7:34 PM   Dressing Type Transparent 10/5/2018  7:34 PM   Hub Color/Line Status Green 10/5/2018  7:34 PM       Peripheral IV 10/05/18 Right Antecubital (Active)   Site Assessment Clean, dry, & intact 10/5/2018  7:35 PM   Phlebitis Assessment 0 10/5/2018  7:35 PM   Infiltration Assessment 0 10/5/2018  7:35 PM   Dressing Status Clean, dry, & intact 10/5/2018  7:35 PM   Dressing Type Transparent 10/5/2018  7:35 PM   Hub Color/Line Status Green 10/5/2018  7:35 PM     Objective:  Vital Signs:    Visit Vitals    /72    Pulse (!) 126    Temp (!) 102.7 °F (39.3 °C)    Resp 18    Ht 6' 2\" (1.88 m)    Wt 79.8 kg (176 lb)    SpO2 100%    BMI 22.6 kg/m2      O2 Device: Room air     Temp (24hrs), Av.7 °F (39.3 °C), Min:102.7 °F (39.3 °C), Max:102.7 °F (39.3 °C)      Intake/Output:   Last shift:      10/05 190 - 10/06 07  In: -   Out: 375 [Urine:375]      Intake/Output Summary (Last 24 hours) at 10/06/18 0208  Last data filed at 10/05/18 2128   Gross per 24 hour   Intake                0 ml   Output              375 ml   Net             -375 ml     Physical Exam:   General/Neurology: Alert, Awake,   Head:   Normocephalic, without obvious abnormality  Eye:   PERRL, EOM intact, no scleral icterus, no pallor  Oral:   Mucus membranes moist  Neck:   Supple  Lung:   B/l air entry is fair  Heart:   Regular rate & rhythm. S1 S2 present.   Abdomen: Soft, non tender, BS+nt  Extremities:  Rt 2nd toe has edema and erythema    Data:      Recent Results (from the past 24 hour(s))   GLUCOSE, POC    Collection Time: 10/05/18  7:08 PM   Result Value Ref Range    Glucose (POC) 292 (H) 70 - 110 mg/dL   CULTURE, BLOOD    Collection Time: 10/05/18  7:20 PM   Result Value Ref Range    Special Requests: PERIPHERAL      Culture result: PENDING    METABOLIC PANEL, COMPREHENSIVE    Collection Time: 10/05/18  7:20 PM   Result Value Ref Range    Sodium 131 (L) 136 - 145 mmol/L    Potassium 3.0 (L) 3.5 - 5.5 mmol/L    Chloride 94 (L) 100 - 108 mmol/L    CO2 22 21 - 32 mmol/L    Anion gap 15 3.0 - 18 mmol/L    Glucose 258 (H) 74 - 99 mg/dL    BUN 7 7.0 - 18 MG/DL    Creatinine 0.84 0.6 - 1.3 MG/DL    BUN/Creatinine ratio 8 (L) 12 - 20      GFR est AA >60 >60 ml/min/1.73m2    GFR est non-AA >60 >60 ml/min/1.73m2    Calcium 8.2 (L) 8.5 - 10.1 MG/DL    Bilirubin, total 1.7 (H) 0.2 - 1.0 MG/DL    ALT (SGPT) 57 16 - 61 U/L    AST (SGOT) 29 15 - 37 U/L    Alk. phosphatase 313 (H) 45 - 117 U/L    Protein, total 7.4 6.4 - 8.2 g/dL    Albumin 2.6 (L) 3.4 - 5.0 g/dL    Globulin 4.8 (H) 2.0 - 4.0 g/dL    A-G Ratio 0.5 (L) 0.8 - 1.7     CBC WITH AUTOMATED DIFF    Collection Time: 10/05/18  7:20 PM   Result Value Ref Range    WBC 14.9 (H) 4.6 - 13.2 K/uL    RBC 4.55 (L) 4.70 - 5.50 M/uL    HGB 13.6 13.0 - 16.0 g/dL    HCT 38.2 36.0 - 48.0 %    MCV 84.0 74.0 - 97.0 FL    MCH 29.9 24.0 - 34.0 PG    MCHC 35.6 31.0 - 37.0 g/dL    RDW 13.2 11.6 - 14.5 %    PLATELET 880 728 - 023 K/uL    MPV 10.4 9.2 - 11.8 FL    NEUTROPHILS 88 (H) 40 - 73 %    LYMPHOCYTES 10 (L) 21 - 52 %    MONOCYTES 2 (L) 3 - 10 %    EOSINOPHILS 0 0 - 5 %    BASOPHILS 0 0 - 2 %    ABS. NEUTROPHILS 13.2 (H) 1.8 - 8.0 K/UL    ABS. LYMPHOCYTES 1.4 0.9 - 3.6 K/UL    ABS. MONOCYTES 0.2 0.05 - 1.2 K/UL    ABS. EOSINOPHILS 0.0 0.0 - 0.4 K/UL    ABS.  BASOPHILS 0.0 0.0 - 0.1 K/UL    DF AUTOMATED     TYPE & SCREEN    Collection Time: 10/05/18  7:20 PM   Result Value Ref Range    Crossmatch Expiration 10/08/2018     ABO/Rh(D) Wenceslao Edman POSITIVE Antibody screen NEG    PROTHROMBIN TIME + INR    Collection Time: 10/05/18  7:20 PM   Result Value Ref Range    Prothrombin time 13.4 11.5 - 15.2 sec    INR 1.1 0.8 - 1.2     SED RATE (ESR)    Collection Time: 10/05/18  7:20 PM   Result Value Ref Range    Sed rate, automated 39 (H) 0 - 15 mm/hr   HEMOGLOBIN A1C WITH EAG    Collection Time: 10/05/18  7:20 PM   Result Value Ref Range    Hemoglobin A1c 9.6 (H) 4.2 - 5.6 %    Est. average glucose 229 mg/dL   TROPONIN I    Collection Time: 10/05/18  7:20 PM   Result Value Ref Range    Troponin-I, Qt. <0.02 0.0 - 0.045 NG/ML   MAGNESIUM    Collection Time: 10/05/18  7:20 PM   Result Value Ref Range    Magnesium 1.2 (L) 1.6 - 2.6 mg/dL   POC LACTIC ACID    Collection Time: 10/05/18  7:27 PM   Result Value Ref Range    Lactic Acid (POC) 4.5 (HH) 0.4 - 2.0 mmol/L   CULTURE, BLOOD    Collection Time: 10/05/18  7:31 PM   Result Value Ref Range    Special Requests: PERIPHERAL      Culture result: PENDING    EKG, 12 LEAD, INITIAL    Collection Time: 10/05/18  7:31 PM   Result Value Ref Range    Ventricular Rate 126 BPM    Atrial Rate 126 BPM    P-R Interval 122 ms    QRS Duration 90 ms    Q-T Interval 344 ms    QTC Calculation (Bezet) 498 ms    Calculated P Axis 63 degrees    Calculated R Axis 65 degrees    Calculated T Axis -96 degrees    Diagnosis       Sinus tachycardia  Possible Left atrial enlargement  Marked ST abnormality, possible inferior subendocardial injury  Abnormal ECG  When compared with ECG of 13-MAY-2016 22:10,  No significant change was found     URINALYSIS W/ RFLX MICROSCOPIC    Collection Time: 10/05/18  9:20 PM   Result Value Ref Range    Color YELLOW      Appearance CLEAR      Specific gravity 1.011 1.005 - 1.030      pH (UA) 5.5 5.0 - 8.0      Protein TRACE (A) NEG mg/dL    Glucose >1000 (A) NEG mg/dL    Ketone NEGATIVE  NEG mg/dL    Bilirubin NEGATIVE  NEG      Blood NEGATIVE  NEG      Urobilinogen 2.0 (H) 0.2 - 1.0 EU/dL    Nitrites NEGATIVE  NEG Leukocyte Esterase NEGATIVE  NEG     URINE MICROSCOPIC ONLY    Collection Time: 10/05/18  9:20 PM   Result Value Ref Range    WBC 0 to 3 0 - 4 /hpf    RBC 0 to 3 0 - 5 /hpf    Epithelial cells FEW 0 - 5 /lpf    Bacteria 1+ (A) NEG /hpf    Mucus 1+ (A) NEG /lpf   GLUCOSE, POC    Collection Time: 10/06/18  1:06 AM   Result Value Ref Range    Glucose (POC) 210 (H) 70 - 110 mg/dL   POC LACTIC ACID    Collection Time: 10/06/18  1:10 AM   Result Value Ref Range    Lactic Acid (POC) 0.7 0.4 - 2.0 mmol/L         Chemistry Recent Labs      10/05/18   1920   GLU  258*   NA  131*   K  3.0*   CL  94*   CO2  22   BUN  7   CREA  0.84   CA  8.2*   MG  1.2*   AGAP  15   BUCR  8*   AP  313*   TP  7.4   ALB  2.6*   GLOB  4.8*   AGRAT  0.5*       CBC w/Diff Recent Labs      10/05/18   1920   WBC  14.9*   RBC  4.55*   HGB  13.6   HCT  38.2   PLT  181   GRANS  88*   LYMPH  10*   EOS  0     Micro     Recent Labs      10/05/18   1931  10/05/18   1920   CULT  PENDING  PENDING     Recent Labs      10/05/18   1931  10/05/18   1920   CULT  PENDING  PENDING     High complexity decision making was performed during the evaluation of this patient at high risk for decompensation with multiple organ involvement     Above mentioned total time spent on reviewing the case/medical record/data/notes/EMR/patient examination/documentation/coordinating care with nurse/consultants, exclusive of procedures with complex decision making performed and > 50% time spent in face to face evaluation.     Jona Fontenot MD  10/6/2018

## 2018-10-06 NOTE — PERIOP NOTES
TRANSFER - OUT REPORT: 
 
Verbal report given to davon sanabria(name) on Duane C Williams  being transferred to 221(unit) for routine post - op Report consisted of patients Situation, Background, Assessment and  
Recommendations(SBAR). Information from the following report(s) SBAR, OR Summary, Intake/Output and MAR was reviewed with the receiving nurse. Lines:  
Peripheral IV 10/05/18 Left Antecubital (Active) Site Assessment Clean, dry, & intact 10/6/2018  1:00 PM  
Phlebitis Assessment 0 10/6/2018  1:00 PM  
Infiltration Assessment 0 10/6/2018  1:00 PM  
Dressing Status Clean, dry, & intact 10/6/2018  1:00 PM  
Dressing Type Transparent; Topical skin adhesive 10/6/2018  1:00 PM  
Hub Color/Line Status Green 10/6/2018  1:00 PM  
   
Peripheral IV 10/05/18 Right Antecubital (Active) Site Assessment Clean, dry, & intact 10/6/2018  1:00 PM  
Phlebitis Assessment 0 10/6/2018  1:00 PM  
Infiltration Assessment 0 10/6/2018  1:00 PM  
Dressing Status Clean, dry, & intact 10/6/2018  1:00 PM  
Dressing Type Transparent;Tape 10/6/2018  1:00 PM  
Hub Color/Line Status Green; Infusing 10/6/2018  1:00 PM  
  
 
Opportunity for questions and clarification was provided. Patient transported with: 
 Registered Nurse

## 2018-10-06 NOTE — ANESTHESIA PROCEDURE NOTES
Peripheral Block Start time: 10/6/2018 11:15 AM 
End time: 10/6/2018 11:20 AM 
Performed by: Bryan Bennett Authorized by: Bryan Bennett Pre-procedure: Indications: at surgeon's request and post-op pain management Preanesthetic Checklist: patient identified, risks and benefits discussed, site marked, timeout performed, anesthesia consent given and patient being monitored Timeout Time: 11:20 Block Type:  
Block Type:  Popliteal (ipack) Laterality:  Right Monitoring:  Standard ASA monitoring, continuous pulse ox, responsive to questions, oxygen, frequent vital sign checks and heart rate Injection Technique:  Single shot Procedures: ultrasound guided Patient Position: supine Prep: chlorhexidine Location:  Mid thigh Needle Type:  Stimuplex Needle Gauge:  22 G Needle Localization:  Ultrasound guidance Medication Injected:  0.5% 
mepivacaine Volume (mL):  20 Assessment: 
Number of attempts:  1 Injection Assessment:  Incremental injection every 5 mL, no paresthesia, ultrasound image on chart, local visualized surrounding nerve on ultrasound, negative aspiration for blood and no intravascular symptoms Patient tolerance:  Patient tolerated the procedure well with no immediate complications

## 2018-10-07 LAB
ALBUMIN SERPL-MCNC: 1.7 G/DL (ref 3.4–5)
ALBUMIN/GLOB SERPL: 0.5 {RATIO} (ref 0.8–1.7)
ALP SERPL-CCNC: 205 U/L (ref 45–117)
ALT SERPL-CCNC: 42 U/L (ref 16–61)
ANION GAP SERPL CALC-SCNC: 9 MMOL/L (ref 3–18)
AST SERPL-CCNC: 32 U/L (ref 15–37)
ATRIAL RATE: 126 BPM
BASOPHILS # BLD: 0 K/UL (ref 0–0.1)
BASOPHILS NFR BLD: 0 % (ref 0–2)
BILIRUB SERPL-MCNC: 0.7 MG/DL (ref 0.2–1)
BUN SERPL-MCNC: 5 MG/DL (ref 7–18)
BUN/CREAT SERPL: 9 (ref 12–20)
CALCIUM SERPL-MCNC: 7.2 MG/DL (ref 8.5–10.1)
CALCULATED P AXIS, ECG09: 63 DEGREES
CALCULATED R AXIS, ECG10: 65 DEGREES
CALCULATED T AXIS, ECG11: -96 DEGREES
CHLORIDE SERPL-SCNC: 112 MMOL/L (ref 100–108)
CO2 SERPL-SCNC: 21 MMOL/L (ref 21–32)
CREAT SERPL-MCNC: 0.57 MG/DL (ref 0.6–1.3)
DATE LAST DOSE: ABNORMAL
DIAGNOSIS, 93000: NORMAL
DIFFERENTIAL METHOD BLD: ABNORMAL
EOSINOPHIL # BLD: 0.1 K/UL (ref 0–0.4)
EOSINOPHIL NFR BLD: 1 % (ref 0–5)
ERYTHROCYTE [DISTWIDTH] IN BLOOD BY AUTOMATED COUNT: 13.5 % (ref 11.6–14.5)
GLOBULIN SER CALC-MCNC: 3.6 G/DL (ref 2–4)
GLUCOSE BLD STRIP.AUTO-MCNC: 116 MG/DL (ref 70–110)
GLUCOSE BLD STRIP.AUTO-MCNC: 213 MG/DL (ref 70–110)
GLUCOSE BLD STRIP.AUTO-MCNC: 289 MG/DL (ref 70–110)
GLUCOSE BLD STRIP.AUTO-MCNC: 324 MG/DL (ref 70–110)
GLUCOSE SERPL-MCNC: 84 MG/DL (ref 74–99)
HCT VFR BLD AUTO: 30.3 % (ref 36–48)
HGB BLD-MCNC: 10.4 G/DL (ref 13–16)
LYMPHOCYTES # BLD: 0.6 K/UL (ref 0.9–3.6)
LYMPHOCYTES NFR BLD: 6 % (ref 21–52)
MCH RBC QN AUTO: 29.2 PG (ref 24–34)
MCHC RBC AUTO-ENTMCNC: 34.3 G/DL (ref 31–37)
MCV RBC AUTO: 85.1 FL (ref 74–97)
MONOCYTES # BLD: 1 K/UL (ref 0.05–1.2)
MONOCYTES NFR BLD: 10 % (ref 3–10)
NEUTS SEG # BLD: 8 K/UL (ref 1.8–8)
NEUTS SEG NFR BLD: 83 % (ref 40–73)
P-R INTERVAL, ECG05: 122 MS
PLATELET # BLD AUTO: 143 K/UL (ref 135–420)
PMV BLD AUTO: 10 FL (ref 9.2–11.8)
POTASSIUM SERPL-SCNC: 3.6 MMOL/L (ref 3.5–5.5)
PROT SERPL-MCNC: 5.3 G/DL (ref 6.4–8.2)
Q-T INTERVAL, ECG07: 344 MS
QRS DURATION, ECG06: 90 MS
QTC CALCULATION (BEZET), ECG08: 498 MS
RBC # BLD AUTO: 3.56 M/UL (ref 4.7–5.5)
REPORTED DOSE,DOSE: ABNORMAL UNITS
REPORTED DOSE/TIME,TMG: 800
SODIUM SERPL-SCNC: 142 MMOL/L (ref 136–145)
VANCOMYCIN TROUGH SERPL-MCNC: 32.8 UG/ML (ref 10–20)
VENTRICULAR RATE, ECG03: 126 BPM
WBC # BLD AUTO: 9.6 K/UL (ref 4.6–13.2)

## 2018-10-07 PROCEDURE — 82962 GLUCOSE BLOOD TEST: CPT

## 2018-10-07 PROCEDURE — 74011250636 HC RX REV CODE- 250/636: Performed by: SINGLE SPECIALTY

## 2018-10-07 PROCEDURE — 74011636637 HC RX REV CODE- 636/637: Performed by: HOSPITALIST

## 2018-10-07 PROCEDURE — 74011250636 HC RX REV CODE- 250/636: Performed by: INTERNAL MEDICINE

## 2018-10-07 PROCEDURE — 74011250637 HC RX REV CODE- 250/637: Performed by: SINGLE SPECIALTY

## 2018-10-07 PROCEDURE — 74011250636 HC RX REV CODE- 250/636: Performed by: HOSPITALIST

## 2018-10-07 PROCEDURE — 85025 COMPLETE CBC W/AUTO DIFF WBC: CPT | Performed by: HOSPITALIST

## 2018-10-07 PROCEDURE — 36415 COLL VENOUS BLD VENIPUNCTURE: CPT | Performed by: HOSPITALIST

## 2018-10-07 PROCEDURE — 65270000029 HC RM PRIVATE

## 2018-10-07 PROCEDURE — 74011250637 HC RX REV CODE- 250/637: Performed by: HOSPITALIST

## 2018-10-07 PROCEDURE — 80202 ASSAY OF VANCOMYCIN: CPT | Performed by: INTERNAL MEDICINE

## 2018-10-07 PROCEDURE — 80053 COMPREHEN METABOLIC PANEL: CPT | Performed by: HOSPITALIST

## 2018-10-07 PROCEDURE — 74011636637 HC RX REV CODE- 636/637: Performed by: INTERNAL MEDICINE

## 2018-10-07 PROCEDURE — 74011000258 HC RX REV CODE- 258: Performed by: INTERNAL MEDICINE

## 2018-10-07 RX ADMIN — PIPERACILLIN SODIUM,TAZOBACTAM SODIUM 4.5 G: 4; .5 INJECTION, POWDER, FOR SOLUTION INTRAVENOUS at 20:03

## 2018-10-07 RX ADMIN — Medication 10 ML: at 21:50

## 2018-10-07 RX ADMIN — INSULIN LISPRO 6 UNITS: 100 INJECTION, SOLUTION INTRAVENOUS; SUBCUTANEOUS at 12:46

## 2018-10-07 RX ADMIN — SODIUM CHLORIDE 100 ML/HR: 900 INJECTION, SOLUTION INTRAVENOUS at 22:43

## 2018-10-07 RX ADMIN — INSULIN LISPRO 9 UNITS: 100 INJECTION, SOLUTION INTRAVENOUS; SUBCUTANEOUS at 18:25

## 2018-10-07 RX ADMIN — VANCOMYCIN HYDROCHLORIDE 1500 MG: 10 INJECTION, POWDER, LYOPHILIZED, FOR SOLUTION INTRAVENOUS at 09:00

## 2018-10-07 RX ADMIN — SODIUM CHLORIDE 100 ML/HR: 900 INJECTION, SOLUTION INTRAVENOUS at 12:44

## 2018-10-07 RX ADMIN — PREDNISONE 10 MG: 5 TABLET ORAL at 09:21

## 2018-10-07 RX ADMIN — SODIUM CHLORIDE 1000 MG: 900 INJECTION, SOLUTION INTRAVENOUS at 21:46

## 2018-10-07 RX ADMIN — METOPROLOL TARTRATE 50 MG: 50 TABLET ORAL at 09:22

## 2018-10-07 RX ADMIN — INSULIN GLARGINE 15 UNITS: 100 INJECTION, SOLUTION SUBCUTANEOUS at 21:46

## 2018-10-07 RX ADMIN — HEPARIN SODIUM 5000 UNITS: 5000 INJECTION INTRAVENOUS; SUBCUTANEOUS at 23:05

## 2018-10-07 RX ADMIN — SODIUM CHLORIDE 100 ML/HR: 900 INJECTION, SOLUTION INTRAVENOUS at 01:01

## 2018-10-07 RX ADMIN — OXYCODONE AND ACETAMINOPHEN 2 TABLET: 5; 325 TABLET ORAL at 16:51

## 2018-10-07 RX ADMIN — METOPROLOL TARTRATE 50 MG: 50 TABLET ORAL at 20:03

## 2018-10-07 RX ADMIN — INSULIN LISPRO 12 UNITS: 100 INJECTION, SOLUTION INTRAVENOUS; SUBCUTANEOUS at 23:05

## 2018-10-07 RX ADMIN — PIPERACILLIN SODIUM,TAZOBACTAM SODIUM 4.5 G: 4; .5 INJECTION, POWDER, FOR SOLUTION INTRAVENOUS at 12:45

## 2018-10-07 RX ADMIN — PIPERACILLIN SODIUM,TAZOBACTAM SODIUM 4.5 G: 4; .5 INJECTION, POWDER, FOR SOLUTION INTRAVENOUS at 04:03

## 2018-10-07 NOTE — PROGRESS NOTES
Problem: Falls - Risk of 
Goal: *Absence of Falls Document Benson Levels Fall Risk and appropriate interventions in the flowsheet. Outcome: Progressing Towards Goal 
Fall Risk Interventions: 
Mobility Interventions: Patient to call before getting OOB Medication Interventions: Assess postural VS orthostatic hypotension, Patient to call before getting OOB Elimination Interventions: Call light in reach

## 2018-10-07 NOTE — OP NOTES
[address] REPORT    Heri Giordano  MR#: [id number]  : 1970  ACCOUNT #: [de-identified]   DATE OF SERVICE: 10/06/2018    SURGEON:  Tiff Salas MD      ASSISTANT:  None. PREOPERATIVE DIAGNOSIS:  Right foot 2nd digit osteomyelitis. POSTOPERATIVE DIAGNOSIS:  Right foot 2nd digit osteomyelitis. PROCEDURE PERFORMED:  Right foot 2nd digit open amputation. ANESTHESIA:  MAC. HEMOSTASIS:  None. ESTIMATED BLOOD LOSS:  Minimal.    SPECIMENS REMOVED:  Right second digit disarticulated at the level of the MPJ and sent to pathology. Soft tissue cultures taken of the right second digit. MATERIALS USED:  None. INJECTABLES:  None. COMPLICATIONS:  None. IMPLANTS:  none     INDICATIONS FOR THE PROCEDURE:  The patient is a 44-year-old male who presents to the hospital with the above chief complaint. All conservative treatment options have been utilized on the patient without long-term relief of his symptoms. The patient requires surgical intervention at this time. The consent was reviewed, consent signed and in the patient's chart. All questions were answered and no guarantees were made as to the outcome of the procedure. DESCRIPTION OF PROCEDURE:  The patient was brought to the operating room where he remained on the stretcher for the duration of the case. Once adequate amounts of anesthesia were achieved by the department of anesthesia, the foot was prepped and draped in the usual sterile manner. Attention was directed to the right foot where upon examination, there was noted to be a large amount of edema and erythema present to the right lower extremity. The second digit appeared with diffuse edema and purulent drainage present throughout the digit. Next, using a #15 blade, an elliptical incision was made at the base of the second digit. The incision was deepened using a combination of blunt and sharp dissection.   All bleeders were cauterized or ligated as deemed necessary throughout the duration of the case. The incision was deepened further down to the level of the metatarsophalangeal joint. Upon doing so, approximately 3 mL of purulent drainage was present. This was cultured and passed off to the back table. The incision was deepened further and the soft tissue structures surrounding the metatarsophalangeal joint were freed and the digit was disarticulated at the level of the metatarsophalangeal joint, and passed off to the back table for specimen. The surgical site was then evaluated for any further remaining purulent drainage, none was present. There was no tracking or undermining present throughout the soft tissue structures. The second metatarsal head appeared healthy and viable. The wound was flushed using copious amounts of normal sterile saline solution using pulse lavage technique. The incision site was then packed with quarter inch iodoform gauze and covered with a sterile dressing consisting of 4 x 4s, Kerlix and an Ace wrap. The patient tolerated the above procedure and anesthesia well without complication. The patient was transported to the PACU with vital signs stable and vascular status intact.       LUZ Jenkins / Easton Puentes  D: 10/07/2018 11:08     T: 10/07/2018 11:54  JOB #: [id number]

## 2018-10-07 NOTE — PROGRESS NOTES
2282 Received pt. Lesions on BLE noted, per pt this is present PTA. Has central IJ line, 3 lumen, with blood return, dressing c/d/i. 2 IV sites, patent, no sign of infiltration. Dressing with breakthrough drainage, dry and intact, kept elevated with pillow. SCD with flat sheet in between skin and SCD sleeve. Numbness on BLE, tingling on BUE. 
 
1206 Pt wife been asking what time MD is coming, wife wants to speak to an MD to know until when pt will be admitted and abx concerns. Wife now at the nurse's station asking again. Will page MD and will relay concern. Rachelle Tapia 19. with Dr. Yamila Escudero, per MD he will be here in an hour to see pt. 
 
1400 MD to pt room. 1530 BMx 3, loose then watery, WBC not elevated. 1630 Awaiting for vanc trough result. 2[address] [facility] [address] to ff-up vanc trough. 1738 Pharmacy notified of Vanc trough. 1830 Pt had BM x3 the whole day. Passing gas. IV lines patent, no sign of infiltration, dressing c/d/i. Heparin order to hold needs clarification, will relay to incoming RN. Fresh wound from LLE noted when checked, SCD removed for a while. Pt still on bedrest, using commode if to have BM. Dressing not changed yet from surgery. Pain under control. Numbness on BLE, tingling on BUE. Per pt, numbness present with extreme weakness on BLE PTA.

## 2018-10-07 NOTE — MANAGEMENT PLAN
Discharge/Transition Planning Interviewed patient. Verified demographics listed on face sheet with patient; all information correct. Pt with [address] [place] [address] Judith Fajardo, [facility], and Sarah Walsh. Patient stated their PCP is Dr Donny Milian and was seen over there recently and prescribed oral antibiotics. Patient lives in 1 story, single family home with wife . Patient's NOK is wife. Patient independent with ADLs prior to admission. DME prior to admission: cane. Discharge plan is Home. HH?/Antibiotics?/Wound Care Clinic. Care Management will do [facility] transfer form with pt. Patient has designated _wife_______________________ to participate in his/her discharge plan and to receive any needed information. Name: Paola Herbert Address:same as pt Phone number:[phone] Care Management Interventions PCP Verified by CM: Yes (Dr Donny Milian with [place]) Mode of Transport at Discharge: Other (see comment) Transition of Care Consult (CM Consult): Discharge Planning Current Support Network: Lives with Spouse, Own Home Confirm Follow Up Transport: Self Plan discussed with Pt/Family/Caregiver: Yes Discharge Location Discharge Placement: Home Reason for Admission:   Sepsis/Diabetic Foot Ulcer RRAT Score:    3 Plan for utilizing home health:  Not likely as not Home Bound Likelihood of Readmission:  Moderate Transition of Care Plan:   Home Felecia Hoyt RN BSN Outcomes Manager Pager # [phone]

## 2018-10-07 NOTE — PROGRESS NOTES
Progress Note Patient: Lottie Vila               Sex: male          DOA: 10/5/2018 YOB: 1970      Age:  41 y.o.        LOS:  LOS: 2 days CHIEF COMPLAINT: 
 
Subjective: He feels better and denies fever, chills, CP, or SOB. Wife at bedside. Objective:  
  
Visit Vitals  BP (!) 168/96 (BP 1 Location: Left arm, BP Patient Position: At rest)  Pulse 83  Temp 97.5 °F (36.4 °C)  Resp 18  Ht 6' 2\" (1.88 m)  Wt 176 lb (79.8 kg)  SpO2 100%  BMI 22.6 kg/m2 Physical Exam: 
GEN: AAO X 3, NAD 
CVS: Normal S1S2, RRR 
RESP: CTAB 
ABD: Soft, NT/ND, +BS 
EXT: Right foot wrapped in dressing NEURO: CN 2 - 12 intact with no focal deficits noted. Lab/Data Reviewed: 
CMP:  
Lab Results Component Value Date/Time  10/07/2018 06:16 AM  
 K 3.6 10/07/2018 06:16 AM  
  (H) 10/07/2018 06:16 AM  
 CO2 21 10/07/2018 06:16 AM  
 AGAP 9 10/07/2018 06:16 AM  
 GLU 84 10/07/2018 06:16 AM  
 BUN 5 (L) 10/07/2018 06:16 AM  
 CREA 0.57 (L) 10/07/2018 06:16 AM  
 GFRAA >60 10/07/2018 06:16 AM  
 GFRNA >60 10/07/2018 06:16 AM  
 CA 7.2 (L) 10/07/2018 06:16 AM  
 ALB 1.7 (L) 10/07/2018 06:16 AM  
 TP 5.3 (L) 10/07/2018 06:16 AM  
 GLOB 3.6 10/07/2018 06:16 AM  
 AGRAT 0.5 (L) 10/07/2018 06:16 AM  
 SGOT 32 10/07/2018 06:16 AM  
 ALT 42 10/07/2018 06:16 AM  
 
CBC:  
Lab Results Component Value Date/Time WBC 9.6 10/07/2018 06:16 AM  
 HGB 10.4 (L) 10/07/2018 06:16 AM  
 HCT 30.3 (L) 10/07/2018 06:16 AM  
  10/07/2018 06:16 AM  
 
 
 
 
Assessment/Plan Active Problems: 
  Sepsis ([address] (5/13/2016) Diabetic foot ([address] (10/5/2018) Diabetic foot ulcer ([address] (10/6/2018) Septic arthritis of IP joint of toe, right ([address] (10/6/2018) Plan: 1. Sepsis -  Continue Vanco and Zosyn and IVF. BC currently with no growth 2. Diabetic R foot infection -  Continue Vanco and Zosyn and IVF.  Podiatry performed surgery yesterday. Follow intraoperative cultures. 3. H/o A fib - rate controlled - Hold AC for now 4. H/o Sarcoidosis - Continue prednisone 5. HTN - Continue Metoprolol 50 mg BID started yesterday and close BP monitoring. 6. Chronic Pain - IV Morphine prn  
7. Hypokalemia. Repleted and normal today 8.  Uncontrolled DM - HBA1C 10.8. Glucose controlled. Continue lantus and correctional insulin. Continue close blood glucose monitoring. DVT Px - Heparin  
  
  
Nola An DO, MPH Internal Medicine

## 2018-10-08 LAB
ALBUMIN SERPL-MCNC: 1.8 G/DL (ref 3.4–5)
ALBUMIN/GLOB SERPL: 0.5 {RATIO} (ref 0.8–1.7)
ALP SERPL-CCNC: 194 U/L (ref 45–117)
ALT SERPL-CCNC: 39 U/L (ref 16–61)
ANION GAP SERPL CALC-SCNC: 8 MMOL/L (ref 3–18)
AST SERPL-CCNC: 30 U/L (ref 15–37)
BASOPHILS # BLD: 0 K/UL (ref 0–0.1)
BASOPHILS NFR BLD: 0 % (ref 0–2)
BILIRUB SERPL-MCNC: 0.5 MG/DL (ref 0.2–1)
BUN SERPL-MCNC: 5 MG/DL (ref 7–18)
BUN/CREAT SERPL: 7 (ref 12–20)
CALCIUM SERPL-MCNC: 7.3 MG/DL (ref 8.5–10.1)
CHLORIDE SERPL-SCNC: 114 MMOL/L (ref 100–108)
CO2 SERPL-SCNC: 21 MMOL/L (ref 21–32)
CREAT SERPL-MCNC: 0.67 MG/DL (ref 0.6–1.3)
DATE LAST DOSE: ABNORMAL
DIFFERENTIAL METHOD BLD: ABNORMAL
EOSINOPHIL # BLD: 0.1 K/UL (ref 0–0.4)
EOSINOPHIL NFR BLD: 1 % (ref 0–5)
ERYTHROCYTE [DISTWIDTH] IN BLOOD BY AUTOMATED COUNT: 13.7 % (ref 11.6–14.5)
GLOBULIN SER CALC-MCNC: 3.5 G/DL (ref 2–4)
GLUCOSE BLD STRIP.AUTO-MCNC: 125 MG/DL (ref 70–110)
GLUCOSE BLD STRIP.AUTO-MCNC: 136 MG/DL (ref 70–110)
GLUCOSE BLD STRIP.AUTO-MCNC: 206 MG/DL (ref 70–110)
GLUCOSE BLD STRIP.AUTO-MCNC: 256 MG/DL (ref 70–110)
GLUCOSE SERPL-MCNC: 106 MG/DL (ref 74–99)
HCT VFR BLD AUTO: 31 % (ref 36–48)
HGB BLD-MCNC: 10.5 G/DL (ref 13–16)
LYMPHOCYTES # BLD: 0.7 K/UL (ref 0.9–3.6)
LYMPHOCYTES NFR BLD: 10 % (ref 21–52)
MCH RBC QN AUTO: 29 PG (ref 24–34)
MCHC RBC AUTO-ENTMCNC: 33.9 G/DL (ref 31–37)
MCV RBC AUTO: 85.6 FL (ref 74–97)
MONOCYTES # BLD: 0.7 K/UL (ref 0.05–1.2)
MONOCYTES NFR BLD: 9 % (ref 3–10)
NEUTS SEG # BLD: 6.1 K/UL (ref 1.8–8)
NEUTS SEG NFR BLD: 80 % (ref 40–73)
PLATELET # BLD AUTO: 159 K/UL (ref 135–420)
PMV BLD AUTO: 9.7 FL (ref 9.2–11.8)
POTASSIUM SERPL-SCNC: 3.2 MMOL/L (ref 3.5–5.5)
PROT SERPL-MCNC: 5.3 G/DL (ref 6.4–8.2)
RBC # BLD AUTO: 3.62 M/UL (ref 4.7–5.5)
REPORTED DOSE,DOSE: 0 UNITS
REPORTED DOSE/TIME,TMG: 500
SODIUM SERPL-SCNC: 143 MMOL/L (ref 136–145)
VANCOMYCIN TROUGH SERPL-MCNC: 22 UG/ML (ref 10–20)
WBC # BLD AUTO: 7.6 K/UL (ref 4.6–13.2)

## 2018-10-08 PROCEDURE — 74011250636 HC RX REV CODE- 250/636: Performed by: INTERNAL MEDICINE

## 2018-10-08 PROCEDURE — 82962 GLUCOSE BLOOD TEST: CPT

## 2018-10-08 PROCEDURE — 74011250636 HC RX REV CODE- 250/636: Performed by: HOSPITALIST

## 2018-10-08 PROCEDURE — 74011636637 HC RX REV CODE- 636/637: Performed by: INTERNAL MEDICINE

## 2018-10-08 PROCEDURE — 80053 COMPREHEN METABOLIC PANEL: CPT | Performed by: HOSPITALIST

## 2018-10-08 PROCEDURE — 80202 ASSAY OF VANCOMYCIN: CPT | Performed by: HOSPITALIST

## 2018-10-08 PROCEDURE — 74011250637 HC RX REV CODE- 250/637: Performed by: HOSPITALIST

## 2018-10-08 PROCEDURE — 65270000029 HC RM PRIVATE

## 2018-10-08 PROCEDURE — 74011000258 HC RX REV CODE- 258: Performed by: INTERNAL MEDICINE

## 2018-10-08 PROCEDURE — 74011636637 HC RX REV CODE- 636/637: Performed by: HOSPITALIST

## 2018-10-08 PROCEDURE — 74011250637 HC RX REV CODE- 250/637: Performed by: SINGLE SPECIALTY

## 2018-10-08 PROCEDURE — 85025 COMPLETE CBC W/AUTO DIFF WBC: CPT | Performed by: HOSPITALIST

## 2018-10-08 PROCEDURE — 36415 COLL VENOUS BLD VENIPUNCTURE: CPT | Performed by: HOSPITALIST

## 2018-10-08 RX ORDER — INSULIN LISPRO 100 [IU]/ML
4 INJECTION, SOLUTION INTRAVENOUS; SUBCUTANEOUS
Status: DISCONTINUED | OUTPATIENT
Start: 2018-10-08 | End: 2018-10-10

## 2018-10-08 RX ORDER — INSULIN LISPRO 100 [IU]/ML
INJECTION, SOLUTION INTRAVENOUS; SUBCUTANEOUS
Status: DISCONTINUED | OUTPATIENT
Start: 2018-10-08 | End: 2018-10-12

## 2018-10-08 RX ORDER — POTASSIUM CHLORIDE 20 MEQ/1
40 TABLET, EXTENDED RELEASE ORAL ONCE
Status: COMPLETED | OUTPATIENT
Start: 2018-10-08 | End: 2018-10-09

## 2018-10-08 RX ORDER — PANTOPRAZOLE SODIUM 40 MG/1
40 TABLET, DELAYED RELEASE ORAL
Status: DISCONTINUED | OUTPATIENT
Start: 2018-10-08 | End: 2018-10-17 | Stop reason: HOSPADM

## 2018-10-08 RX ORDER — VENLAFAXINE 37.5 MG/1
75 TABLET ORAL
Status: DISCONTINUED | OUTPATIENT
Start: 2018-10-08 | End: 2018-10-17 | Stop reason: HOSPADM

## 2018-10-08 RX ORDER — POTASSIUM CHLORIDE 20 MEQ/1
40 TABLET, EXTENDED RELEASE ORAL
Status: COMPLETED | OUTPATIENT
Start: 2018-10-08 | End: 2018-10-08

## 2018-10-08 RX ADMIN — SODIUM CHLORIDE 100 ML/HR: 900 INJECTION, SOLUTION INTRAVENOUS at 08:26

## 2018-10-08 RX ADMIN — PIPERACILLIN SODIUM,TAZOBACTAM SODIUM 4.5 G: 4; .5 INJECTION, POWDER, FOR SOLUTION INTRAVENOUS at 11:29

## 2018-10-08 RX ADMIN — INSULIN LISPRO 4 UNITS: 100 INJECTION, SOLUTION INTRAVENOUS; SUBCUTANEOUS at 12:38

## 2018-10-08 RX ADMIN — METOPROLOL TARTRATE 50 MG: 50 TABLET ORAL at 08:28

## 2018-10-08 RX ADMIN — PANTOPRAZOLE SODIUM 40 MG: 40 TABLET, DELAYED RELEASE ORAL at 21:29

## 2018-10-08 RX ADMIN — HEPARIN SODIUM 5000 UNITS: 5000 INJECTION INTRAVENOUS; SUBCUTANEOUS at 15:01

## 2018-10-08 RX ADMIN — SODIUM CHLORIDE 1000 MG: 900 INJECTION, SOLUTION INTRAVENOUS at 04:50

## 2018-10-08 RX ADMIN — INSULIN LISPRO 9 UNITS: 100 INJECTION, SOLUTION INTRAVENOUS; SUBCUTANEOUS at 12:36

## 2018-10-08 RX ADMIN — VENLAFAXINE HYDROCHLORIDE 75 MG: 37.5 TABLET ORAL at 21:29

## 2018-10-08 RX ADMIN — HEPARIN SODIUM 5000 UNITS: 5000 INJECTION INTRAVENOUS; SUBCUTANEOUS at 06:15

## 2018-10-08 RX ADMIN — ONDANSETRON 4 MG: 2 INJECTION INTRAMUSCULAR; INTRAVENOUS at 19:35

## 2018-10-08 RX ADMIN — Medication 10 ML: at 04:54

## 2018-10-08 RX ADMIN — OXYCODONE AND ACETAMINOPHEN 2 TABLET: 5; 325 TABLET ORAL at 21:58

## 2018-10-08 RX ADMIN — INSULIN LISPRO 6 UNITS: 100 INJECTION, SOLUTION INTRAVENOUS; SUBCUTANEOUS at 17:35

## 2018-10-08 RX ADMIN — SODIUM CHLORIDE 1000 MG: 900 INJECTION, SOLUTION INTRAVENOUS at 12:33

## 2018-10-08 RX ADMIN — INSULIN LISPRO 4 UNITS: 100 INJECTION, SOLUTION INTRAVENOUS; SUBCUTANEOUS at 17:35

## 2018-10-08 RX ADMIN — POTASSIUM CHLORIDE 40 MEQ: 20 TABLET, EXTENDED RELEASE ORAL at 20:43

## 2018-10-08 RX ADMIN — PIPERACILLIN SODIUM,TAZOBACTAM SODIUM 4.5 G: 4; .5 INJECTION, POWDER, FOR SOLUTION INTRAVENOUS at 19:34

## 2018-10-08 RX ADMIN — PREDNISONE 10 MG: 5 TABLET ORAL at 08:28

## 2018-10-08 RX ADMIN — INSULIN GLARGINE 15 UNITS: 100 INJECTION, SOLUTION SUBCUTANEOUS at 21:29

## 2018-10-08 RX ADMIN — PIPERACILLIN SODIUM,TAZOBACTAM SODIUM 4.5 G: 4; .5 INJECTION, POWDER, FOR SOLUTION INTRAVENOUS at 03:55

## 2018-10-08 RX ADMIN — METOPROLOL TARTRATE 50 MG: 50 TABLET ORAL at 20:44

## 2018-10-08 RX ADMIN — Medication 10 ML: at 15:02

## 2018-10-08 RX ADMIN — ACETAMINOPHEN 650 MG: 325 TABLET, FILM COATED ORAL at 08:32

## 2018-10-08 NOTE — PROGRESS NOTES
Bedside shift change report given to Tahir Simons, RN, Sun Pettit, RN (oncoming nurse) by Stephanie Rahman RN (offgoing nurse). Report included the following information SBAR. 
 
1937: Dr. Xiomy castañeda, potassium 3.2.   
2009: Patient informs this nurse he takes Prilosec and Effexor once daily, paged Dr. Jazmin Connor. 2156: Critical vanc trough, dosing to be retimed by pharmacy. Orders were received for home medications and oral potassium, BMP will be done in AM.  
 
1924: Dressing changed to RLE. Bedside shift change report given to Claudine RN (oncoming nurse) by Sun Pettit RN (offgoing nurse). Report included the following information SBAR.

## 2018-10-08 NOTE — PROGRESS NOTES
Problem: Falls - Risk of 
Goal: *Absence of Falls Document Alexsander Dowling Fall Risk and appropriate interventions in the flowsheet. Outcome: Progressing Towards Goal 
Fall Risk Interventions: 
Mobility Interventions: Communicate number of staff needed for ambulation/transfer, Patient to call before getting OOB Medication Interventions: Teach patient to arise slowly, Patient to call before getting OOB, Evaluate medications/consider consulting pharmacy Elimination Interventions: Call light in reach, Patient to call for help with toileting needs, Toileting schedule/hourly rounds, Urinal in reach Problem: Pressure Injury - Risk of 
Goal: *Prevention of pressure injury Document Rajesh Scale and appropriate interventions in the flowsheet. Pressure Injury Interventions: 
Sensory Interventions: Keep linens dry and wrinkle-free, Maintain/enhance activity level, Minimize linen layers Activity Interventions: Increase time out of bed, Pressure redistribution bed/mattress(bed type) Mobility Interventions: HOB 30 degrees or less, Pressure redistribution bed/mattress (bed type) Nutrition Interventions: Document food/fluid/supplement intake, Offer support with meals,snacks and hydration

## 2018-10-08 NOTE — PROGRESS NOTES
8006- Bedside and Verbal shift change report given to Isaías Orona RN (oncoming nurse) by Nito Luong RN (offgoing nurse). Report included the following information SBAR, Kardex and MAR.  
 
1230- Dietary rounding on pt.  
 
1930- Bedside and Verbal shift change report given to Nito Luong RN (oncoming nurse) by Eric Campbell RN (offgoing nurse). Report included the following information SBAR, Kardex and MAR.

## 2018-10-08 NOTE — PROGRESS NOTES
Pharmacy Dosing Services: Vancomycin Indication: Diabetic Foot Infection Day of therapy: 2 Other Antimicrobials (Include dose, start day & day of therapy): 
Piperacillin-Tazobactam 4.5 grams every 8 hours SLIM, 10- Loading dose (date given): 2,000 mg 
Current Maintenance dose: 1,500 mg every 8 hours Goal Vancomycin Level: 15-20 mcg/mL (Trough 15-20 for most infections, 20 for meningitis/osteomyelitis, pre-HD level ~25) Vancomycin Level (if drawn):  
10- 32.8 mcg/mL after 7.28 hours Significant Cultures:  
Blood culture taken Surgical culture taken Anaerobic culture taken Fungus culture taken Renal function stable? (unstable defined as SCr increase of 0.5 mg/dL or > 50% increase from baseline, whichever is greater) (Y/N): Y  
 
CAPD, Hemodialysis or Renal Replacement Therapy (Y/N): N Recent Labs 10/07/18 
 5848  10/06/18 
 0434  10/05/18 
 1920 CREA  0.57*  0.48*  0.84 BUN  5*  5*  7 WBC  9.6  10.4  14.9* Temp (24hrs), Av °F (36.7 °C), Min:97.4 °F (36.3 °C), Max:99.4 °F (37.4 °C) Creatinine Clearance (Creatinine Clearance (ml/min)): Estimated Creatinine Clearance: 184.7 mL/min (based on Cr of 0.57). Regimen assessment: Dose decreased based on trough Maintenance dose: 1,000 mg every 8 hours Next scheduled level: 10- at 1230 Pharmacy will follow daily and adjust medications as appropriate for renal function and/or serum levels. Thank you, Sean Sharma [place]

## 2018-10-08 NOTE — PROGRESS NOTES
Bedside shift change report given to Leigha RN (oncoming nurse) by Angela Tong RN (offgoing nurse). Report included the following information SBAR.  
 
2120: Spoke with Dr. Bladimir Dixon, order to hold heparin after midnight placed yesterday, was not D/C, note from Dr. Johnny Mays noted patient has heparin for VTE prevention however order still remains to hold, Dr. Bladimir Dixon states it is ok to resume heparin.

## 2018-10-08 NOTE — CDMP QUERY
Please clarify if this patient is being treated/managed for: 
 
=>Sepsis with Septic shock as evidenced by tachycardia, hypotension, treated with levophed drip, now improved 
=>Other Explanation of clinical findings =>Unable to Determine (no explanation of clinical findings) The medical record reflects the following: 
 
Risk:diabetic foot with infection, requiring amputation and abx Clinical Indicators:10/6--Critical care MD noted--Sepsis, Shock-pt has recieved about 4L of fluid so far, on Levophed d 2mcg Treatment: Levophed drip, Abx Please clarify and document your clinical opinion in the progress notes and discharge summary including the definitive and/or presumptive diagnosis, (suspected or probable), related to the above clinical findings. Please include clinical findings supporting your diagnosis. If you DECLINE this query or would like to communicate with [facility], please utilize the \"[employer] message box\" at the TOP of the Progress Note on the right. Thank you, Elsa Santiago RN [facility] 
[phone]

## 2018-10-08 NOTE — DIABETES MGMT
GLYCEMIC CONTROL AND NUTRITION Assessment/Recommendations: 
Fasting lab glucose this am 136 mg/dl Noted all postprandial BG readings elevated. Lispro 4 units added with meals. Continue corrective insulin coverage as needed Will continue inpatient monitoring. Most recent blood glucose values: 
Results for Familia Ramon (MRN [id number]) as of 10/8/2018 13:13 Ref. Range 10/7/2018 11:25 10/7/2018 17:11 10/7/2018 22:58 10/8/2018 04:53 10/8/2018 11:43 GLUCOSE,FAST - POC Latest Ref Range: 70 - 110 mg/dL 213 (H) 289 (H) 324 (H) 136 (H) 256 (H) Current A1C of 9.6 % is equivalent to average blood glucose of 229 mg/dl over the past 2-3 months. Current hospital diabetes medications:  
lantus 15 units every bedtime Lispro corrective insulin coverage AC&HS Previous day's insulin requirements:  
lantus 15 units Lispro 27 units corrective insulin Home diabetes medications:  Per pt Levemir 80 units twice a day Metformin 1000 mg twice a day Diet:   
Cardiac regular, consistent carb Education:  __x_Refer to Diabetes Education Record  
          ____Education not indicated at this time Mary Frank RN CDE Ext [phone]

## 2018-10-08 NOTE — PROGRESS NOTES
Progress Note Patient: Nikolai Thompson               Sex: male          DOA: 10/5/2018 YOB: 1970      Age:  41 y.o.        LOS:  LOS: 3 days CHIEF COMPLAINT: 
 
Subjective: No complaints this am, family at bedside, await further recs from podiatry Objective:  
  
Visit Vitals  /87  Pulse 89  Temp 98.6 °F (37 °C)  Resp 19  
 Ht 6' 2\" (1.88 m)  Wt 79.8 kg (176 lb)  SpO2 100%  BMI 22.6 kg/m2 Physical Exam: 
GEN: AAO X 3, NAD 
CVS: Normal S1S2, RRR 
RESP: CTAB 
ABD: Soft, NT/ND, +BS 
EXT: Right foot wrapped in dressing NEURO: CN 2 - 12 intact with no focal deficits noted. Lab/Data Reviewed: 
CMP:  
Lab Results Component Value Date/Time  10/08/2018 07:37 AM  
 K 3.2 (L) 10/08/2018 07:37 AM  
  (H) 10/08/2018 07:37 AM  
 CO2 21 10/08/2018 07:37 AM  
 AGAP 8 10/08/2018 07:37 AM  
  (H) 10/08/2018 07:37 AM  
 BUN 5 (L) 10/08/2018 07:37 AM  
 CREA 0.67 10/08/2018 07:37 AM  
 GFRAA >60 10/08/2018 07:37 AM  
 GFRNA >60 10/08/2018 07:37 AM  
 CA 7.3 (L) 10/08/2018 07:37 AM  
 ALB 1.8 (L) 10/08/2018 07:37 AM  
 TP 5.3 (L) 10/08/2018 07:37 AM  
 GLOB 3.5 10/08/2018 07:37 AM  
 AGRAT 0.5 (L) 10/08/2018 07:37 AM  
 SGOT 30 10/08/2018 07:37 AM  
 ALT 39 10/08/2018 07:37 AM  
 
CBC:  
Lab Results Component Value Date/Time WBC 7.6 10/08/2018 07:37 AM  
 HGB 10.5 (L) 10/08/2018 07:37 AM  
 HCT 31.0 (L) 10/08/2018 07:37 AM  
  10/08/2018 07:37 AM  
 
 
 
 
Assessment/Plan Active Problems: 
  Sepsis ([facility] (5/13/2016) Diabetic foot ([facility] (10/5/2018) Diabetic foot ulcer ([facility] (10/6/2018) Septic arthritis of IP joint of toe, right ([facility] (10/6/2018) Plan: 1. Sepsis -  Continue Vanco and Zosyn . BC currently with no growth 2. Diabetic R foot infection -  Continue Vanco and Zosyn. Podiatry performed 10/6, follow cx with clear margins suspect will need only po abx 3. H/o A fib - rate controlled - Hold AC for now 4. H/o Sarcoidosis - Continue prednisone 5. HTN - Continue Metoprolol 50 mg BID started yesterday and close BP monitoring. 6. Chronic Pain - IV Morphine prn  
                    7.  Uncontrolled DM - HBA1C 10.8. Glucose controlled. Continue lantus and correctional insulin.                    Continue close blood glucose monitoring.

## 2018-10-08 NOTE — PROGRESS NOTES
Problem: Falls - Risk of 
Goal: *Absence of Falls Document Janes Zamora Fall Risk and appropriate interventions in the flowsheet. Outcome: Progressing Towards Goal 
Fall Risk Interventions: 
Mobility Interventions: Patient to call before getting OOB, Utilize walker, cane, or other assistive device Medication Interventions: Assess postural VS orthostatic hypotension, Patient to call before getting OOB, Teach patient to arise slowly Elimination Interventions: Call light in reach Problem: Pressure Injury - Risk of 
Goal: *Prevention of pressure injury Document Rajesh Scale and appropriate interventions in the flowsheet. Outcome: Progressing Towards Goal 
Pressure Injury Interventions: 
Sensory Interventions: Keep linens dry and wrinkle-free Activity Interventions: Increase time out of bed, Pressure redistribution bed/mattress(bed type) Mobility Interventions: HOB 30 degrees or less, Pressure redistribution bed/mattress (bed type) Nutrition Interventions: Document food/fluid/supplement intake

## 2018-10-08 NOTE — DIABETES MGMT
NUTRITIONAL ASSESSMENT GLYCEMIC CONTROL/ PLAN OF CARE Fern Rai           41 y.o.           10/5/2018 1. Acute osteomyelitis ([facility] 2. Septic shock ([facility] 3. Hypokalemia 4. Hypomagnesemia INTERVENTIONS/PLAN:  
1.  2200 calorie consistent CHO diet 2. Chocolate No Added Sugar [place] Instant Breakfast TID 3. Suggest 4 units scheduled lispro TID before meals. 4.  Monitor po intake, labs and weights. ASSESSMENT:  
Nutritional Status:  Pt is 92% ideal wt; BMI (calculated): 22.6 kg/m2 (normal weight classification but pt has experienced unintentional weight loss). Pt with hypoalbuminemia as evidenced by albumin of 1.8 mg/dL. Current po intake is good but pt would benefit from receiving an increase calories and protein to promote wound healing and prevent continued weight loss. Nutrition Diagnoses:  
Unintentional weight loss due to inadequate energy intake as evidenced by net weight loss of 49 lb over past 2 years with 10 lbs of this weight loss in the past year. Altered nutrition related labs due to diabetes as evidenced by A1C of 9.6%. Increased nutrient needs due to wound healing as evidenced by diabetic R foot infection. Diabetes Management: Pt with good fasting blood glucose but high readings before lunch, dinner and HS. He would benefit from receiving scheduled meal time lispro. Recent blood glucose:    
10/8/18:  136, 256 
10/7/18:  116, 213, 289, 324 - received 15 units Lantus and 27 units corrective lispro Within target range (non-ICU: <140; ICU<180): [] Yes   [x]  No 
 
Current hospital diabetes medications:  
lantus 15 units every bedtime Lispro corrective insulin coverage, very insulin resistant dosing, AC&HS Home medication/insulin regimen:  
Per pt Levemir 80 units twice a day Metformin 1000 mg twice a day Current A1C of 9.6 % is equivalent to average blood glucose of 229 mg/dl over the past 2-3 months. Adequate glycemic control PTA:  [] Yes  [x] No 
  
SUBJECTIVE/OBJECTIVE: Information obtained from: chart review, pt 
10/8/18:  Pt reports his appetite was not good PTA but is now very good. He related experiencing a 49 lb weight loss \"when I was sick\" about 2 years ago. He state his weight loss has slowed down but he still lost 10 lbs in past year. He denies problems with chewing or swallowing and denies having food allergies. Diet: cardiac, 1800 calorie consistent CHO Patient Vitals for the past 100 hrs: 
 % Diet Eaten 10/08/18 0926 100 % 10/07/18 1730 90 % 10/07/18 1300 100 % Medications: [x]                Reviewed Pertinent: Prednisone 10 mg/d Most Recent POC Glucose:  
Recent Labs 10/08/18 
 1288  10/07/18 
 5518  10/06/18 
 0434  10/05/18 
 1920 GLU  106*  84  216*  258* Labs:  
Lab Results Component Value Date/Time Hemoglobin A1c 9.6 (H) 10/05/2018 07:20 PM  
 
Lab Results Component Value Date/Time Sodium 143 10/08/2018 07:37 AM  
 Potassium 3.2 (L) 10/08/2018 07:37 AM  
 Chloride 114 (H) 10/08/2018 07:37 AM  
 CO2 21 10/08/2018 07:37 AM  
 Anion gap 8 10/08/2018 07:37 AM  
 Glucose 106 (H) 10/08/2018 07:37 AM  
 BUN 5 (L) 10/08/2018 07:37 AM  
 Creatinine 0.67 10/08/2018 07:37 AM  
 Calcium 7.3 (L) 10/08/2018 07:37 AM  
 Magnesium 1.2 (L) 10/05/2018 07:20 PM  
 Albumin 1.8 (L) 10/08/2018 07:37 AM  
 
 
Anthropometrics: IBW : 86.4 kg (190 lb 7.6 oz), % IBW (Calculated): 92.4 %, BMI (calculated): 22.6 Wt Readings from Last 1 Encounters:  
10/05/18 79.8 kg (176 lb) Ht Readings from Last 1 Encounters:  
10/05/18 6' 2\" (1.88 m) Last Weight Metrics: 
Weight Loss Metrics 10/5/2018 5/16/2016 Today's Wt 176 lb 195 lb 8 oz BMI 22.6 kg/m2 25.09 kg/m2 Estimated Nutrition Needs:  7167 Kcals/day, Protein (g): 120 g Fluid (ml): 2400 ml Based on:   [x]          Actual BW    []          ABW   []            Adjusted BW   
  
Nutrition Interventions: Increase calories to 2200 calories/day (consistent CHO) Add No Added Sugar [place] Instant Breakfast TID. Goal:  
Blood glucose will be within target range of  mg/dL by 10/11/18. Pt will consume > 75% meals and po supplements by 10/13/18. Weight maintenance (+/- 1-2 kg) or gain 1-2 lbs/week by 10/20/18. Nutrition Monitoring and Evaluation   
 
[x]     Monitor po intake on meal rounds 
[x]     Continue inpatient monitoring and intervention 
[]     Other: 
 
 
Nutrition Risk:  []   High     [x]  Moderate    []  Minimal/Uncompromised Lynn Callahan, RD, CDE Office:  [phone] [place] Pager:  [phone]\

## 2018-10-08 NOTE — PROGRESS NOTES
S/P amputation second toe right foot. His discharge plan is to return home with home care as indicated for IV antibiotics,  wound care and therapy. The patient wife will need to agrees to learn infusion administration, if the patient discharges home with home care on Iv antibiotics. Mickeal Rink He will need a PICC line if antibiotics cannot be converted to po. If the patient requires rehab he will need PT /OT evaluations and authorization for admission to SNF.  Yoselyn Holt RN

## 2018-10-08 NOTE — DIABETES MGMT
Diabetes Patient/Family Education Record Factors That  May Influence Patients Ability  to Learn or  Comply with Recommendations 
 []   Language barrier    []   Cultural needs   [x]   Motivation  
 []   Cognitive limitation    []   Physical   []   Education  
 []   Physiological factors   []   Hearing/vision/speaking impairment   []   Ep[employer]alian beliefs []   Financial factors   []  Other:   []  No factors identified at this time. Person Instructed: 
 [x]   Patient   []   Family   []  Other Preference for Learning: 
 [x]   Verbal   [x]   Written   []  Demonstration Level of Comprehension & Competence:   
[x]  Good                                      [] Fair                                     []  Poor                             []  Needs Reinforcement  
[x]  Teachback completed Education Component:  
[x]  Medication management, including how to administer insulin (if appropriate) and potential medication interactions []  Nutritional management   
[]  Exercise [x]  Signs, symptoms, and treatment of hyperglycemia and hypoglycemia states he feels symptoms of hypoglycemia at 120-130 mg/dl and tries to keep his blood glucose above 140 mg/dl [x] Prevention, recognition and treatment of hyperglycemia and hypoglycemia [x]  Importance of blood glucose monitoring and how to obtain a blood glucose meter   
[x]  Instruction on use of the blood glucose meter pt has a one touch meter and supplies at home, but doesn't check his blood sugar. States he hates pricking his fingers. Is interested in the [employer] glucose monitoring system. Literature given to patient. He will need a prescription for it at discharge. [x]  Discuss the importance of HbA1C monitoring   
[]  Sick day guidelines  
[]  Proper use and disposal of lancets, needles, syringes or insulin pens (if appropriate) [x]  Potential long-term complications (retinopathy, kidney disease, neuropathy, foot care) [x] Information about whom to contact in case of emergency or for more information   
[x]  Goal:  Patient/family will demonstrate understanding of Diabetes Self Management Skills by: (date) __10/13/18_____ Plan for post-discharge education or self-management support: 
  [x] Outpatient class schedule provided            [] Patient Declined 
  [] Scheduled for outpatient classes (date) _______ Mary Frank RN CDE [facility] [phone] Ext [phone]

## 2018-10-09 LAB
ANION GAP SERPL CALC-SCNC: 11 MMOL/L (ref 3–18)
BUN SERPL-MCNC: 7 MG/DL (ref 7–18)
BUN/CREAT SERPL: 11 (ref 12–20)
CALCIUM SERPL-MCNC: 7.7 MG/DL (ref 8.5–10.1)
CHLORIDE SERPL-SCNC: 116 MMOL/L (ref 100–108)
CO2 SERPL-SCNC: 17 MMOL/L (ref 21–32)
CREAT SERPL-MCNC: 0.63 MG/DL (ref 0.6–1.3)
GLUCOSE BLD STRIP.AUTO-MCNC: 110 MG/DL (ref 70–110)
GLUCOSE BLD STRIP.AUTO-MCNC: 198 MG/DL (ref 70–110)
GLUCOSE BLD STRIP.AUTO-MCNC: 325 MG/DL (ref 70–110)
GLUCOSE BLD STRIP.AUTO-MCNC: 346 MG/DL (ref 70–110)
GLUCOSE SERPL-MCNC: 96 MG/DL (ref 74–99)
POTASSIUM SERPL-SCNC: 3.9 MMOL/L (ref 3.5–5.5)
SODIUM SERPL-SCNC: 144 MMOL/L (ref 136–145)

## 2018-10-09 PROCEDURE — 36415 COLL VENOUS BLD VENIPUNCTURE: CPT | Performed by: HOSPITALIST

## 2018-10-09 PROCEDURE — 65270000029 HC RM PRIVATE

## 2018-10-09 PROCEDURE — 74011250637 HC RX REV CODE- 250/637: Performed by: HOSPITALIST

## 2018-10-09 PROCEDURE — 74011000258 HC RX REV CODE- 258: Performed by: INTERNAL MEDICINE

## 2018-10-09 PROCEDURE — 74011636637 HC RX REV CODE- 636/637: Performed by: INTERNAL MEDICINE

## 2018-10-09 PROCEDURE — 74011250636 HC RX REV CODE- 250/636: Performed by: HOSPITALIST

## 2018-10-09 PROCEDURE — 77030019895 HC PCKNG STRP IODO -A

## 2018-10-09 PROCEDURE — 74011250636 HC RX REV CODE- 250/636: Performed by: INTERNAL MEDICINE

## 2018-10-09 PROCEDURE — 80048 BASIC METABOLIC PNL TOTAL CA: CPT | Performed by: HOSPITALIST

## 2018-10-09 PROCEDURE — 82962 GLUCOSE BLOOD TEST: CPT

## 2018-10-09 PROCEDURE — 74011636637 HC RX REV CODE- 636/637: Performed by: HOSPITALIST

## 2018-10-09 RX ORDER — INSULIN GLARGINE 100 [IU]/ML
17 INJECTION, SOLUTION SUBCUTANEOUS
Status: DISCONTINUED | OUTPATIENT
Start: 2018-10-09 | End: 2018-10-10

## 2018-10-09 RX ADMIN — HEPARIN SODIUM 5000 UNITS: 5000 INJECTION INTRAVENOUS; SUBCUTANEOUS at 14:28

## 2018-10-09 RX ADMIN — Medication 10 ML: at 21:30

## 2018-10-09 RX ADMIN — INSULIN LISPRO 12 UNITS: 100 INJECTION, SOLUTION INTRAVENOUS; SUBCUTANEOUS at 22:40

## 2018-10-09 RX ADMIN — Medication 10 ML: at 12:21

## 2018-10-09 RX ADMIN — VENLAFAXINE HYDROCHLORIDE 75 MG: 37.5 TABLET ORAL at 21:25

## 2018-10-09 RX ADMIN — INSULIN LISPRO 3 UNITS: 100 INJECTION, SOLUTION INTRAVENOUS; SUBCUTANEOUS at 12:21

## 2018-10-09 RX ADMIN — Medication 10 ML: at 17:50

## 2018-10-09 RX ADMIN — HEPARIN SODIUM 5000 UNITS: 5000 INJECTION INTRAVENOUS; SUBCUTANEOUS at 06:49

## 2018-10-09 RX ADMIN — PANTOPRAZOLE SODIUM 40 MG: 40 TABLET, DELAYED RELEASE ORAL at 21:26

## 2018-10-09 RX ADMIN — HEPARIN SODIUM 5000 UNITS: 5000 INJECTION INTRAVENOUS; SUBCUTANEOUS at 00:17

## 2018-10-09 RX ADMIN — INSULIN LISPRO 4 UNITS: 100 INJECTION, SOLUTION INTRAVENOUS; SUBCUTANEOUS at 12:20

## 2018-10-09 RX ADMIN — INSULIN GLARGINE 17 UNITS: 100 INJECTION, SOLUTION SUBCUTANEOUS at 22:41

## 2018-10-09 RX ADMIN — SODIUM CHLORIDE 1250 MG: 900 INJECTION, SOLUTION INTRAVENOUS at 14:35

## 2018-10-09 RX ADMIN — PREDNISONE 10 MG: 5 TABLET ORAL at 08:52

## 2018-10-09 RX ADMIN — INSULIN LISPRO 12 UNITS: 100 INJECTION, SOLUTION INTRAVENOUS; SUBCUTANEOUS at 17:48

## 2018-10-09 RX ADMIN — METOPROLOL TARTRATE 50 MG: 50 TABLET ORAL at 08:51

## 2018-10-09 RX ADMIN — SODIUM CHLORIDE 1250 MG: 900 INJECTION, SOLUTION INTRAVENOUS at 00:18

## 2018-10-09 RX ADMIN — POTASSIUM CHLORIDE 40 MEQ: 20 TABLET, EXTENDED RELEASE ORAL at 00:17

## 2018-10-09 RX ADMIN — INSULIN LISPRO 4 UNITS: 100 INJECTION, SOLUTION INTRAVENOUS; SUBCUTANEOUS at 17:47

## 2018-10-09 RX ADMIN — METOPROLOL TARTRATE 50 MG: 50 TABLET ORAL at 21:26

## 2018-10-09 RX ADMIN — Medication 10 ML: at 00:22

## 2018-10-09 RX ADMIN — PIPERACILLIN SODIUM,TAZOBACTAM SODIUM 4.5 G: 4; .5 INJECTION, POWDER, FOR SOLUTION INTRAVENOUS at 04:30

## 2018-10-09 RX ADMIN — PIPERACILLIN SODIUM,TAZOBACTAM SODIUM 4.5 G: 4; .5 INJECTION, POWDER, FOR SOLUTION INTRAVENOUS at 12:22

## 2018-10-09 NOTE — PROGRESS NOTES
Progress Note Patient: Cathi Estes               Sex: male          DOA: 10/5/2018 YOB: 1970      Age:  41 y.o.        LOS:  LOS: 4 days CHIEF COMPLAINT: 
 
Subjective: No complaints this am, answered all questions Objective:  
  
Visit Vitals  /88 (BP 1 Location: Left arm, BP Patient Position: At rest)  Pulse 79  Temp 97.5 °F (36.4 °C)  Resp 18  Ht 6' 2\" (1.88 m)  Wt 79.8 kg (176 lb)  SpO2 98%  BMI 22.6 kg/m2 Physical Exam: 
GEN: AAO X 3, NAD 
CVS: Normal S1S2, RRR 
RESP: CTAB 
ABD: Soft, NT/ND, +BS 
EXT: Right foot wrapped in dressing NEURO: CN 2 - 12 intact with no focal deficits noted. Lab/Data Reviewed: 
CMP:  
Lab Results Component Value Date/Time  10/09/2018 05:15 AM  
 K 3.9 10/09/2018 05:15 AM  
  (H) 10/09/2018 05:15 AM  
 CO2 17 (L) 10/09/2018 05:15 AM  
 AGAP 11 10/09/2018 05:15 AM  
 GLU 96 10/09/2018 05:15 AM  
 BUN 7 10/09/2018 05:15 AM  
 CREA 0.63 10/09/2018 05:15 AM  
 GFRAA >60 10/09/2018 05:15 AM  
 GFRNA >60 10/09/2018 05:15 AM  
 CA 7.7 (L) 10/09/2018 05:15 AM  
 
CBC:  
No results found for: WBC, HGB, HGBEXT, HCT, HCTEXT, PLT, PLTEXT, HGBEXT, HCTEXT, PLTEXT Assessment/Plan Active Problems: 
  Sepsis ([address] (5/13/2016) Diabetic foot ([facility] (10/5/2018) Diabetic foot ulcer ([address] (10/6/2018) Septic arthritis of IP joint of toe, right ([address] (10/6/2018) Plan: 1. Sepsis -  Continue Vanc, stop zosyn, ID consult . BC currently with no growth 2. Diabetic R foot infection -  Continue Vanco. Podiatry performed amputation 10/6, follow cx with clear margins suspect will need only po abx. Plan to see again on thurs by podiatry to see if appropriate wound healing for possible wound vac placement. 3. H/o A fib - rate controlled - Hold AC for now 4. H/o Sarcoidosis - Continue prednisone 5.  HTN - Continue Metoprolol 50 mg BID  
 6. Chronic Pain - IV Morphine prn  
                    7.  Uncontrolled DM - HBA1C 10.8. Increase lantus

## 2018-10-09 NOTE — CONSULTS
INFECTIOUS DISEASE CONSULT NOTE       Requested by: Dr Crystal Foster    Reason for consult: OM of second toe    Date of admission: 10/5/2018    Date of consult: October 9, 2018      ABX:     Current abx Prior abx    vanco 10/5-4 Zosyn10/5-4      ASSESSMENT: -- RECOMMENDATIONS      Sepsis POA- suspect sec to cellulitis and abscess/OM of second toe - Improving  - afebrile now and with normal WBC  - will need to dc central line as at risk for BSI   Rt DFU involving second toe- OM and ?abscess and cellulitis  - Xray reviewed and concerning for second DIP joint infection  - ESR 39 and   - S/p amputation at MTP joint, Cx BHS, SA  - Path s/p acute and chronic OM but was sent from toe itself and not clear margin so positive as expected - hopefully Sx should have taken care of infection and pt won't need long term IV Abx  - Await for clinical improvement and further decision from podiatry regarding need for further sx vs wd vac vs closure  - will definitely cover with IV Abx till then  - Will continue Vanco to cover Staph and Strep and will be able to modify based on final Cx  - DC zosyn for now     DM2- Uncontrolled- HbA1c of >9 - Needs better control of sugars for better wd healing and prevention of subsequent infections   H/o Sarcoidosis- on prednisone - Immunocompromised host  - at risk for infection and poor healing on steroids   Erythema nodosum of legs  - suspect sec to sarcoidosis - LWC  - Control of dis   HTN    H/o A fib      MICROBIOLOGY:     10/5 Blcx x2 ntd  10/6 Tissue Cx BHS, SA    LINES/DRAINS:     Rt neck Central line 10/5    HPI:     Mr Rory Harris is a 41 y.o. male with PMHx significant for diabetes, A fib, HTN and sarcoidosis who came to ED 10/5 with c/o worsening Rt second toe swelling and pain. Pt says developed neuropathy in legs over last few years.  He recently cleaned his toe \"too much\" around 6 weeks ago and developed sig redness and swelling of second toe. Pt was seen at [place] about a month ago and was advised to be admitted with iv antibiotics but pt refused. His foot got worse with involvement of whole leg and also affected his leg lesions from sarcoidosis. He developed myalgia, chills, sweats, fever over the past few days PTA and hence came to ED. He was found with sepsis with fever, leucocytosis, and hypotension. He was admitted in ICU and required IVF and pressors. He was also given steroids as chronically on same for his sarcoidosis. Xray of foot s/o early OM of DIP second toe of rt foot. Pt was seen by Podiatry and underwent amputation of second toe. OR note mentioned healthy metatarsal head. Cx grew BHS and SA and path showed acute on ch OM and with extensive gangrenous changes. Pt was transfered out of ICU. Podiatry planning to re-evalaute and see if needs further sx. We were asked for further eval and management. Past medical history:     Past Medical History:   Diagnosis Date    Diabetes [facility]     Hypercholesteremia     Myocardial infarct ([facility]     Sarcoidosis        Social History:     Social History     Social History    Marital status:      Spouse name: N/A    Number of children: N/A    Years of education: N/A     Occupational History    Not on file. Social History Main Topics    Smoking status: Current Every Day Smoker     Packs/day: 0.50    Smokeless tobacco: Never Used    Alcohol use No    Drug use: No    Sexual activity: Not on file     Other Topics Concern    Not on file     Social History Narrative       Family History:   History reviewed. No pertinent family history. Allergies:   No Known Allergies      Home Medications:     Prescriptions Prior to Admission   Medication Sig    gemfibrozil (LOPID) 600 mg tablet Take 600 mg by mouth two (2) times a day.  GABAPENTIN PO Take  by mouth.  aspirin (ASPIRIN) 325 mg tablet Take 325 mg by mouth daily.     metroNIDAZOLE (FLAGYL) 500 mg tablet Take 1 Tab by mouth three (3) times daily.  ciprofloxacin HCl (CIPRO) 250 mg tablet Take 1 Tab by mouth every twelve (12) hours.  insulin detemir (LEVEMIR) 100 unit/mL injection 0.45 mL by SubCUTAneous route nightly.  albuterol (PROVENTIL HFA, VENTOLIN HFA, PROAIR HFA) 90 mcg/actuation inhaler Take 2 Puffs by inhalation every four (4) hours as needed for Wheezing.  dextran 70-hypromellose (ARTIFICIAL TEARS) ophthalmic solution Administer 2 Drops to both eyes as needed.  glipiZIDE (GLUCOTROL) 10 mg tablet Take 10 mg by mouth two (2) times a day. Indications: TYPE 2 DIABETES MELLITUS    metoprolol tartrate (LOPRESSOR) 50 mg tablet Take 50 mg by mouth two (2) times a day.  omeprazole (PRILOSEC) 20 mg capsule Take 20 mg by mouth daily.  predniSONE (DELTASONE) 10 mg tablet Take 25 mg by mouth daily (with breakfast). Indications: SARCOIDOSIS    venlafaxine-SR (EFFEXOR-XR) 75 mg capsule Take  by mouth daily. Indications: POST TRAUMATIC STRESS DISORDER    sildenafil citrate (VIAGRA) 100 mg tablet Take 100 mg by mouth as needed.        Current Medications:     Current Facility-Administered Medications   Medication Dose Route Frequency    insulin lispro (HUMALOG) injection   SubCUTAneous AC&HS    insulin lispro (HUMALOG) injection 4 Units  4 Units SubCUTAneous TIDAC    VANCOMYCIN INFORMATION NOTE   Other PRN    venlafaxine (EFFEXOR) tablet 75 mg  75 mg Oral QHS    pantoprazole (PROTONIX) tablet 40 mg  40 mg Oral QHS    vancomycin (VANCOCIN) 1,250 mg in 0.9% sodium chloride 250 mL IVPB  1,250 mg IntraVENous Q12H    [START ON 10/10/2018] VANCOMYCIN INFORMATION NOTE   Other ONCE    acetaminophen (TYLENOL) tablet 650 mg  650 mg Oral Q6H PRN    ondansetron (ZOFRAN) injection 4 mg  4 mg IntraVENous Q6H PRN    heparin (porcine) injection 5,000 Units  5,000 Units SubCUTAneous Q8H    hydrALAZINE (APRESOLINE) 20 mg/mL injection 10 mg  10 mg IntraVENous Q6H PRN    predniSONE (DELTASONE) tablet 10 mg  10 mg Oral DAILY WITH BREAKFAST    morphine injection 2 mg  2 mg IntraVENous Q4H PRN    sodium chloride (NS) flush 5-10 mL  5-10 mL IntraVENous Q8H    sodium chloride (NS) flush 5-10 mL  5-10 mL IntraVENous PRN    oxyCODONE-acetaminophen (PERCOCET) 5-325 mg per tablet 2 Tab  2 Tab Oral Q4H PRN    piperacillin-tazobactam (ZOSYN) 4.5 g in 0.9% sodium chloride (MBP/ADV) 100 mL MBP  4.5 g IntraVENous Q8H    metoprolol tartrate (LOPRESSOR) tablet 50 mg  50 mg Oral Q12H    insulin glargine (LANTUS) injection 15 Units  15 Units SubCUTAneous QHS    glucose chewable tablet 16 g  16 g Oral PRN    glucagon (GLUCAGEN) injection 1 mg  1 mg IntraMUSCular PRN    dextrose (D50W) injection syrg 12.5-25 g  25-50 mL IntraVENous PRN    influenza vaccine 2018- (6 mos+)(PF) (FLUARIX QUAD/FLULAVAL QUAD) injection 0.5 mL  0.5 mL IntraMUSCular PRIOR TO DISCHARGE    sodium chloride (NS) flush 5-10 mL  5-10 mL IntraVENous PRN    VANCOMYCIN INFORMATION NOTE   Other Rx Dosing/Monitoring       Review of Systems:   12 points ROS done. Pertinent positives and negatives are as follows, ROS otherwise negative. Constitutional: resolved fever, chills, myalgia. HENT: Negative for ear pain, congestion, sore throat, rhinorrhea. Eyes: Negative for pain, redness and visual disturbance. Respiratory: negative for shortness of breath, cough, chest tightness, wheezing. Cardiovascular: Negative for chest pain, palpitations and leg swelling. Gastrointestinal: Negative for nausea, vomiting, abdominal pain or diarrhea  Genitourinary: Negative for dysuria   Musculoskeletal: +ve rt foot pain   Skin: +ve for lesions over his legs, rt second toe pain and swelling, redness of leg- now better   Neurological: Negative for dizziness, syncope, light-headedness or headaches. Hematological:Negative for easy bruising or bleeding. Psychiatric/Behavioral: Negative anxiety, depression.       Physical Exam:  Vitals  Temp (24hrs), Av.9 °F (36.6 °C), Min:97.5 °F (36.4 °C), Max:98.3 °F (36.8 °C)    Visit Vitals    /88 (BP 1 Location: Left arm, BP Patient Position: At rest)    Pulse 79    Temp 97.5 °F (36.4 °C)    Resp 18    Ht 6' 2\" (1.88 m)    Wt 79.8 kg (176 lb)    SpO2 98%    BMI 22.6 kg/m2       General: Well-developed, 41y.o. year-old, male, in no acute distress  HEENT: Normocephalic, anicteric sclerae, Pupils equal, round reactive to light, no oropharyngeal lesions. No sinus tenderness. Neck: Supple, no lymphadenopathy, masses or thyromegaly  Chest: Symmetrical expansion  Lungs: Clear to auscultation bilaterally, no dullness  Heart: Regular rhythm, no murmurs, rubs or gallops, No JVD  Abdomen: Soft, non-tender,non distended, no organomegaly, BS+  Musculoskeletal: Normal strength/tone. No edema. No clubbing or cyanosis  CNS: AAOx3. Cranial nerves II-XII intact.  Grossly normal.No NR  SKIN: b/l LE raised plaque like lesions, no active dx, non tender, Rt foot in dressing, second toe amputated and site with red granulating tissue          Labs: Results:   Chemistry Recent Labs      10/09/18   0515  10/08/18   0737  10/07/18   0616   GLU  96  106*  84   NA  144  143  142   K  3.9  3.2*  3.6   CL  116*  114*  112*   CO2  17*  21  21   BUN  7  5*  5*   CREA  0.63  0.67  0.57*   CA  7.7*  7.3*  7.2*   AGAP  11  8  9   BUCR  11*  7*  9*   AP   --   194*  205*   TP   --   5.3*  5.3*   ALB   --   1.8*  1.7*   GLOB   --   3.5  3.6   AGRAT   --   0.5*  0.5*      CBC w/Diff Recent Labs      10/08/18   0737  10/07/18   0616   WBC  7.6  9.6   RBC  3.62*  3.56*   HGB  10.5*  10.4*   HCT  31.0*  30.3*   PLT  159  143   GRANS  80*  83*   LYMPH  10*  6*   EOS  1  1      Microbiology Recent Labs      10/06/18   1415   CULT  FEW STREPTOCOCCI, BETA HEMOLYTIC GROUP B*  RARE STAPHYLOCOCCUS AUREUS*  NO ANAEROBES ISOLATED 3 DAYS  NO FUNGUS ISOLATED 2 DAYS          Imaging-      Results from Hospital Encounter encounter on 10/05/18   XR CHEST PORT   Narrative EXAM: Portable chest    HISTORY: Chest pain and chills. Shortness of breath. COMPARISON: 10/5/2018    TECHNIQUE: Single portable view. _______________    FINDINGS:    Cardiac size is normal.  The lungs are clear. Pulmonary vasculature is normal.   The visualized bony structures are normal. Right central venous catheter tip in  mid superior vena cava.    _______________         Impression IMPRESSION:    No evidence of active pulmonary process. Interval insertion right central venous  catheter. No pneumothorax. Results from [place] encounter on 05/13/16   CT ABD PELV WO CONT   Narrative Procedure:  CT Abdomen, Pelvis without Contrast.    Indication:  Nausea, vomiting, diarrhea. Comparison:  None. Technique:  Helical volumetric CT imaging of the abdomen and pelvis is performed  at 5 mm axial sections without IV or oral contrast administration. Coronal and  sagittal multiplanar reconstruction images are generated for improved anatomic  delineation. Radiation dose:   mGy-cm. Findings:    Abdomen --    Lung Bases:  Subtle groundglass nodular densities are noted in the right lung  base region. Nonspecific but probably favorable to postinflammatory changes. In the right lower lobe, there is a focal area of stellate density, at the  convergent point of the right lower lobe bronchovascular structures (axial  #9-10), measuring 1.2 x 0.7 cm. Most likely representing a focal area of  scarring. Developing neoplastic process is felt far less likely but cannot be  excluded entirely. Correlation with any available prior studies is recommended. Liver:  Pronounced sclerotic changes. Pancreas:  Fatty replacement of the pancreatic head, neck and proximal body. There is a suggestion of a possible nodular structure at the body of the  pancreas measuring 1.0 x 0.7 cm (axial #32). Spleen, Gallbladder, Adrenal Glands:  Unremarkable.     Kidneys:  Residual contrast is noted in the renal pelvis bilaterally (reportedly  the patient has had CT scan earlier in the day at [facility] -  unavailable for review at this time). The kidneys appear grossly unremarkable  otherwise. Gastrointestinal Tract, Abdominal Wall, Mesentery:      - Unremarkable stomach. - No acute findings along the small bowel. No small bowel obstruction.    - Normal appendix.    - No acute diverticulitis or colitis. A few scattered diverticula are noted. The colon lumen contains fluid nearly throughout the length of the colon,  presumably related to the patient's history of diarrhea. - No mesenteric adenopathy. Retroperitoneum:  A few scattered nodes are identified. The largest  retroperitoneal node measures about 1.8 x 1.1 cm (axial #36). Vascular:  Non-aneurysmal aorta. Pelvis --    Urinary Bladder:  Completely opacified with excreted contrast-containing urine. The bladder appears unremarkable. Prostate:  Unremarkable. Rectum:  Mildly distended with fluid. Pelvic sidewall:  No adenopathy. No free fluid. In the right inguinal canal,  an ovoid intermediate attenuation structure is identified, measuring 3.3 x 3.0 x  5.5 cm. Most likely representing a retracted right testicle. The left testicle  appears to be located within the left scrotal sac, measuring 3.5 x 3.1 x 4.6 cm. Bones:  No acute osseous findings. Impression Impression:    1. Pronounced cirrhotic changes. 2.  Pancreatic nodule in the body segment (7.4 cm from the splenic hilum region)  with the background of fatty infiltration of the pancreas. Recommend  multiphasic CT or MR for further delineation on routine basis. 3.  Colon filled with fluid. Rectum mildly distended with fluid. Probably  related to diarrhea. 4.  Probable scar tissue in the right lower lobe. Doubtful for neoplastic  process. Recommend comparison with prior studies from outside sources to  ascertain stability.   If no priors are available, CT follow-up is recommended to  ascertain stability. 5.  Retracted right testicle.          ---------------------------------------------------------------------------------------------------------------  I have independently examined the patient and reviewed all lab studies and imgaing as well as review of nursing notes and physican notes from the past 24 hours. The plan of care has been discussed with the patient/relative and all questions are answered. Dragon medical dictation software was used for portions of this report. Unintended errors may occur.      Yeimi Esteves MD  10/9/2018    [facility]  [phone]

## 2018-10-09 NOTE — PROGRESS NOTES
Pharmacy Dosing Services: Vancomycin Indication: Diabetic Foot Infection Day of therapy: 3 (start 10/5) Other Antimicrobials (Include dose, start day & day of therapy): 
Piperacillin-Tazobactam 4.5 grams every 8 hours SLIM, 10- Loading dose (date given): 2,000 mg 
Current Maintenance dose: 1,000 mg every 8 hours Goal Vancomycin Level: 15-20 mcg/mL (Trough 15-20 for most infections, 20 for meningitis/osteomyelitis, pre-HD level ~25) Vancomycin Level (if drawn):  
10- 32.8 mcg/mL after 7.28 hours 10- 22 mcg/mL after 8.11 hours Significant Cultures:  
Blood culture taken Surgical culture taken Anaerobic culture taken Fungus culture taken Renal function stable? (unstable defined as SCr increase of 0.5 mg/dL or > 50% increase from baseline, whichever is greater) (Y/N): Y  
 
CAPD, Hemodialysis or Renal Replacement Therapy (Y/N): N Recent Labs 10/08/18 
 7203  10/07/18 
 7648  10/06/18 
 4950 CREA  0.67  0.57*  0.48* BUN  5*  5*  5* WBC  7.6  9.6  10.4 Temp (24hrs), Av.3 °F (36.8 °C), Min:97.6 °F (36.4 °C), Max:98.7 °F (37.1 °C) Creatinine Clearance (Creatinine Clearance (ml/min)): Estimated Creatinine Clearance: 157.2 mL/min (based on Cr of 0.67). Regimen assessment: Trough elevated, both dose and interval changed. Maintenance dose: 1,250 mg every 12 hours Next scheduled level: 10- at 1130 Pharmacy will follow daily and adjust medications as appropriate for renal function and/or serum levels. Thank you, Darwin Brown

## 2018-10-09 NOTE — CONSULTS
Podiatry POST OP NOTE    Subjective: Patient is a 41year old male with a pMHx of DM II, HTN, sarcoidosis, a fib HLDP, and GERD who presents with sepsis to the [facility] Emergency room. He states that he has had an infection in the right foot for approx one month. He states that he was seen at the [facility] and they wanted to admit him for further work up and IV abx, however he refused. He states that since then the area has slowly gotten worse. He admits to having an increase in swelling and drainage from the area. He also admits to a current, every day smoker. He has no other pedal complaints at this time. Patient resting today. POD#3. Denies F/NS/C/N/V. No pain to surgical site       Date of Surgery:  10.6.18    Referring Physician: Dr. Jaycob Catalan    Patient Active Problem List    Diagnosis Date Noted    Diabetic foot ulcer ([address] 10/06/2018    Septic arthritis of IP joint of toe, right ([address] 10/06/2018    Diabetic foot ([address] 10/05/2018    Sarcoidosis 05/13/2016    DKA, type 1 ([address] 05/13/2016    Sepsis ([address] 05/13/2016     Past Medical History:   Diagnosis Date    Diabetes ([address]     Hypercholesteremia     [facility]     Sarcoidosis       History reviewed. No pertinent family history. Social History   Substance Use Topics    Smoking status: Current Every Day Smoker     Packs/day: 0.50    Smokeless tobacco: Never Used    Alcohol use No     Past Surgical History:   Procedure Laterality Date    HX CORONARY STENT PLACEMENT      HX HEART CATHETERIZATION        Prior to Admission medications    Medication Sig Start Date End Date Taking? Authorizing Provider   gemfibrozil (LOPID) 600 mg tablet Take 600 mg by mouth two (2) times a day. Yes Phys Other, MD   GABAPENTIN PO Take  by mouth. Yes Phys MD Joselin   aspirin (ASPIRIN) 325 mg tablet Take 325 mg by mouth daily. Yes Historical Provider   metroNIDAZOLE (FLAGYL) 500 mg tablet Take 1 Tab by mouth three (3) times daily.  5/16/16 Newton Campbell MD   ciprofloxacin HCl (CIPRO) 250 mg tablet Take 1 Tab by mouth every twelve (12) hours. 16   Newton Campbell MD   insulin detemir (LEVEMIR) 100 unit/mL injection 0.45 mL by SubCUTAneous route nightly. 16   Newton Campbell MD   albuterol (PROVENTIL HFA, VENTOLIN HFA, PROAIR HFA) 90 mcg/actuation inhaler Take 2 Puffs by inhalation every four (4) hours as needed for Wheezing. Historical Provider   dextran 70-hypromellose (ARTIFICIAL TEARS) ophthalmic solution Administer 2 Drops to both eyes as needed. Historical Provider   glipiZIDE (GLUCOTROL) 10 mg tablet Take 10 mg by mouth two (2) times a day. Indications: TYPE 2 DIABETES MELLITUS    Historical Provider   metoprolol tartrate (LOPRESSOR) 50 mg tablet Take 50 mg by mouth two (2) times a day. Historical Provider   omeprazole (PRILOSEC) 20 mg capsule Take 20 mg by mouth daily. Historical Provider   predniSONE (DELTASONE) 10 mg tablet Take 25 mg by mouth daily (with breakfast). Indications: SARCOIDOSIS    Historical Provider   venlafaxine-SR [facility] 75 mg capsule Take  by mouth daily. Indications: POST TRAUMATIC STRESS DISORDER    Historical Provider   sildenafil citrate (VIAGRA) 100 mg tablet Take 100 mg by mouth as needed.     Historical Provider     No Known Allergies     Review of Systems:    No changes      Objective:     Patient Vitals for the past 8 hrs:   BP Temp Pulse Resp SpO2   10/09/18 0431 144/90 97.9 °F (36.6 °C) 88 18 100 %   10/09/18 0023 (!) 157/91 97.7 °F (36.5 °C) 77 18 100 %     Temp (24hrs), Av.2 °F (36.8 °C), Min:97.7 °F (36.5 °C), Max:98.6 °F (37 °C)      Data Review:   Recent Results (from the past 24 hour(s))   GLUCOSE, POC    Collection Time: 10/08/18 11:43 AM   Result Value Ref Range    Glucose (POC) 256 (H) 70 - 110 mg/dL   GLUCOSE, POC    Collection Time: 10/08/18  5:06 PM   Result Value Ref Range    Glucose (POC) 206 (H) 70 - 110 mg/dL   [facility]    Collection Time: 10/08/18  8:40 PM   Result Value Ref Range    Vancomycin,trough 22.0 (HH) 10.0 - 20.0 ug/mL    Reported dose date: 20181008      Reported dose time: 500      Reported dose: 0 UNITS   GLUCOSE, POC    Collection Time: 10/08/18  9:32 PM   Result Value Ref Range    Glucose (POC) 125 (H) 70 - 110 mg/dL   GLUCOSE, POC    Collection Time: 10/09/18  6:32 AM   Result Value Ref Range    Glucose (POC) 110 70 - 110 mg/dL     Foot Exam:  Vascular:   DP/PT pulses palpable. CFT to digits b/l less than three seconds. Skin temp warm to cool from proximal to distal.     Neuro:   epicritic sensation intact    Derm:    After dressing d/c:  Open 2nd toe amputation site with exposed 2nd metatarsal head. Fibrous and adipose tissue plantarly. No malodor or pus to the site. Right 3rd toe remains viable without signs of necrosis. Superficial ulceration to the adjacent medial hallux without probing to bone. Mild edema, warmth, and erythema remain to the right foot. Musculoskeltal:   no gross deformities noted. Component Value Flag Ref Range Units Status     Special Requests: RIGHT SECOND DIGIT    Preliminary     GRAM STAIN MODERATE WBC'S     Preliminary     GRAM STAIN      Preliminary     RARE GRAM POSITIVE COCCI IN PAIRS IN CHAINS IN GROUPS     Culture result:  (A)    Preliminary     FEW STREPTOCOCCI, BETA        Impression:   DM type II    S/p right 2nd toe amputation    Cellulitis right foot improving     Recommendation:   Patient seen this morning on rounds with nursing staff    Continue abx    Wound inspected    Begin daily dressing change: wound cleanse then iodoform packing    Thusfar the adjacent 3rd and 1st toes remain viable.   No additional amputation at this time    If area improves of next 48  Hours we will consider wound VAC placment

## 2018-10-09 NOTE — PROGRESS NOTES
Bedside report given by Brooke Samaniego RN. Pt in bed A&OX4,with HOB elevated, bed locked at lowest position, call bell in reach. Pt has purposeful movement in all extremities and show no nasim of distress at this time. Pt dressing burris done at this time. Wound to amputated R 2nd toe is packed with iodofoam, 4x4 and elastic bandage. Pt. Tolerated well. IV sited to RAC and LAC are c/d/i, no swelling, erythema or infiltration noted at this time. Pt shows no signs of distress at this time. Will continue to monitor. 1800 - No change during shift.   
 
1955- Pt transferring to [facility].

## 2018-10-09 NOTE — PROGRESS NOTES
His discharge plan is to return home with home care as indicated for IV antibiotics,  wound care and therapy. The patient wife will need to agrees to learn infusion administration, if the patient discharges home with home care on Iv antibiotics. Gold Almanzar He will need a PICC line if antibiotics cannot be converted to po. If the patient requires rehab he will need PT /OT evaluations

## 2018-10-09 NOTE — PROGRESS NOTES
Problem: Falls - Risk of 
Goal: *Absence of Falls Document Benson Levels Fall Risk and appropriate interventions in the flowsheet. Outcome: Progressing Towards Goal 
Fall Risk Interventions: 
Mobility Interventions: Patient to call before getting OOB Medication Interventions: Patient to call before getting OOB Elimination Interventions: Call light in reach Problem: Pressure Injury - Risk of 
Goal: *Prevention of pressure injury Document Rajesh Scale and appropriate interventions in the flowsheet. Outcome: Progressing Towards Goal 
Pressure Injury Interventions: 
Sensory Interventions: Keep linens dry and wrinkle-free Moisture Interventions: Absorbent underpads Activity Interventions: Increase time out of bed, Pressure redistribution bed/mattress(bed type) Mobility Interventions: HOB 30 degrees or less Nutrition Interventions: Document food/fluid/supplement intake

## 2018-10-09 NOTE — PROGRESS NOTES
Problem: Falls - Risk of 
Goal: *Absence of Falls Document Alexsander Dowling Fall Risk and appropriate interventions in the flowsheet. Outcome: Progressing Towards Goal 
Fall Risk Interventions: 
Mobility Interventions: Patient to call before getting OOB Medication Interventions: Patient to call before getting OOB Elimination Interventions: Call light in reach Problem: Pressure Injury - Risk of 
Goal: *Prevention of pressure injury Document Rajesh Scale and appropriate interventions in the flowsheet. Outcome: Progressing Towards Goal 
Pressure Injury Interventions: 
Sensory Interventions: Keep linens dry and wrinkle-free Moisture Interventions: Absorbent underpads, Apply protective barrier, creams and emollients Activity Interventions: Increase time out of bed Mobility Interventions: HOB 30 degrees or less Nutrition Interventions: Document food/fluid/supplement intake

## 2018-10-10 LAB
BACTERIA SPEC CULT: ABNORMAL
BACTERIA SPEC CULT: ABNORMAL
DATE LAST DOSE: ABNORMAL
GLUCOSE BLD STRIP.AUTO-MCNC: 138 MG/DL (ref 70–110)
GLUCOSE BLD STRIP.AUTO-MCNC: 150 MG/DL (ref 70–110)
GRAM STN SPEC: ABNORMAL
GRAM STN SPEC: ABNORMAL
REPORTED DOSE,DOSE: ABNORMAL UNITS
REPORTED DOSE/TIME,TMG: 0
SERVICE CMNT-IMP: ABNORMAL
VANCOMYCIN TROUGH SERPL-MCNC: 22 UG/ML (ref 10–20)

## 2018-10-10 PROCEDURE — 74011250637 HC RX REV CODE- 250/637: Performed by: HOSPITALIST

## 2018-10-10 PROCEDURE — 65270000029 HC RM PRIVATE

## 2018-10-10 PROCEDURE — 36415 COLL VENOUS BLD VENIPUNCTURE: CPT | Performed by: HOSPITALIST

## 2018-10-10 PROCEDURE — 74011250636 HC RX REV CODE- 250/636: Performed by: HOSPITALIST

## 2018-10-10 PROCEDURE — 80202 ASSAY OF VANCOMYCIN: CPT | Performed by: HOSPITALIST

## 2018-10-10 PROCEDURE — 74011636637 HC RX REV CODE- 636/637: Performed by: HOSPITALIST

## 2018-10-10 PROCEDURE — 82962 GLUCOSE BLOOD TEST: CPT

## 2018-10-10 RX ORDER — INSULIN LISPRO 100 [IU]/ML
3 INJECTION, SOLUTION INTRAVENOUS; SUBCUTANEOUS
Status: DISCONTINUED | OUTPATIENT
Start: 2018-10-10 | End: 2018-10-12

## 2018-10-10 RX ORDER — INSULIN GLARGINE 100 [IU]/ML
27 INJECTION, SOLUTION SUBCUTANEOUS
Status: DISCONTINUED | OUTPATIENT
Start: 2018-10-10 | End: 2018-10-12

## 2018-10-10 RX ORDER — INSULIN GLARGINE 100 [IU]/ML
10 INJECTION, SOLUTION SUBCUTANEOUS ONCE
Status: COMPLETED | OUTPATIENT
Start: 2018-10-10 | End: 2018-10-10

## 2018-10-10 RX ADMIN — VENLAFAXINE HYDROCHLORIDE 75 MG: 37.5 TABLET ORAL at 21:00

## 2018-10-10 RX ADMIN — Medication 10 ML: at 15:44

## 2018-10-10 RX ADMIN — HEPARIN SODIUM 5000 UNITS: 5000 INJECTION INTRAVENOUS; SUBCUTANEOUS at 00:23

## 2018-10-10 RX ADMIN — METOPROLOL TARTRATE 50 MG: 50 TABLET ORAL at 15:50

## 2018-10-10 RX ADMIN — SODIUM CHLORIDE 1000 MG: 900 INJECTION, SOLUTION INTRAVENOUS at 15:30

## 2018-10-10 RX ADMIN — HEPARIN SODIUM 5000 UNITS: 5000 INJECTION INTRAVENOUS; SUBCUTANEOUS at 15:38

## 2018-10-10 RX ADMIN — PANTOPRAZOLE SODIUM 40 MG: 40 TABLET, DELAYED RELEASE ORAL at 21:00

## 2018-10-10 RX ADMIN — SODIUM CHLORIDE 1250 MG: 900 INJECTION, SOLUTION INTRAVENOUS at 00:23

## 2018-10-10 RX ADMIN — HEPARIN SODIUM 5000 UNITS: 5000 INJECTION INTRAVENOUS; SUBCUTANEOUS at 23:47

## 2018-10-10 RX ADMIN — INSULIN GLARGINE 27 UNITS: 100 INJECTION, SOLUTION SUBCUTANEOUS at 22:23

## 2018-10-10 RX ADMIN — METOPROLOL TARTRATE 50 MG: 50 TABLET ORAL at 20:59

## 2018-10-10 RX ADMIN — INSULIN GLARGINE 10 UNITS: 100 INJECTION, SOLUTION SUBCUTANEOUS at 13:00

## 2018-10-10 RX ADMIN — MORPHINE SULFATE 2 MG: 4 INJECTION INTRAVENOUS at 20:59

## 2018-10-10 NOTE — PROGRESS NOTES
[address] [place], [place] [address] Inpatient Daily Progress Note Patient: Karen Farrar MRN: [id number]  CSN: [id number] YOB: 1970  Age: 41 y.o. Sex: male DOA: 10/5/2018 LOS:  LOS: 5 days Chief Complaint:  Sepsis 2/2 diabetic foot infection Subjective:  
  
Pain well controlled Objective:  
  
Visit Vitals  BP (!) 162/94 (BP 1 Location: Left arm, BP Patient Position: Supine)  Pulse 81  Temp 98.5 °F (36.9 °C)  Resp 18  Ht 6' 2\" (1.88 m)  Wt 80.7 kg (178 lb)  SpO2 99%  BMI 22.85 kg/m2 Physical Exam: 
General appearance: alert, cooperative, no distress, appears stated age Lungs: clear to auscultation bilaterally Heart: regular rate and rhythm, S1, S2 normal, no murmur, click, rub or gallop Abdomen: soft, non tender, non distended. Normoactive bowel sounds Extremities: extremities normal, atraumatic, no cyanosis. RLE 2+ edema Skin: Right foot ace dressing CDI Neurologic: Grossly normal 
PSY: Mood and affect normal, appropriately behaved Intake and Output: 
Current Shift:    
Last three shifts:  10/08 1901 - 10/10 0700 In: 2540 [P.O.:1840; I.V.:700] Out: 2350 [Urine:2350] Recent Results (from the past 24 hour(s)) GLUCOSE, POC Collection Time: 10/09/18 11:56 AM  
Result Value Ref Range Glucose (POC) 198 (H) 70 - 110 mg/dL GLUCOSE, POC Collection Time: 10/09/18  5:13 PM  
Result Value Ref Range Glucose (POC) 325 (H) 70 - 110 mg/dL GLUCOSE, POC Collection Time: 10/09/18  9:10 PM  
Result Value Ref Range Glucose (POC) 346 (H) 70 - 110 mg/dL Lab Results Component Value Date/Time Glucose 96 10/09/2018 05:15 AM  
 Glucose 106 (H) 10/08/2018 07:37 AM  
 Glucose 84 10/07/2018 06:16 AM  
 Glucose 216 (H) 10/06/2018 04:34 AM  
 Glucose 258 (H) 10/05/2018 07:20 PM  
  
 
Assessment/Plan:  
 
Patient Active Problem List  
Diagnosis Code  Sarcoidosis D86.9  DKA, type 1 ([address] E10.10  Sepsis ([address] A41.9  Diabetic foot ([address] E11.8  Diabetic foot ulcer ([facility] E11.621, L97.509  Septic arthritis of IP joint of toe, right ([address] M00.9 A/P: 
Diabetic R foot with infection - Podiatry consulted - appreciate assistance Sepsis 2/2 above - Vancomycin - Await OR cultures H/o A fib 
- rate elevated 2y to sepsis 
- Hold AC for now H/o Sarcoidosis - prednisone HTN  
- Lopressor 50 mg BID  
 
T2DM  
- HgbA1C 9.6  
- SSI- resistant scale - Lantus 27 units qHS  
- Diabetic diet Chronic Pain - IV Morphine prn DVT Prophylaxis  
- Heparin  
 
  
 
 
[facility] Pager:  [phone] Office:  [phone]

## 2018-10-10 NOTE — PROGRESS NOTES
Pharmacy Dosing Services: Vancomycin Indication: Diabetic Foot Infection Day of therapy: 5 (start 10/5) Other Antimicrobials (Include dose, start day & day of therapy): 
Piperacillin-Tazobactam 4.5 grams every 8 hours COMPETED (10/6 - 10/9) Loading dose (date given): 2,000 mg (10/05) Current Maintenance dose: 1,250 mg every 12 hours Goal Vancomycin Level: 15-20 mcg/mL (Trough 15-20 for most infections, 20 for meningitis/osteomyelitis, pre-HD level ~25) Vancomycin Level (if drawn):  
10- - 32.8 mcg/mL after 7.28 hours 10- - 22 mcg/mL after 8.11 hours 10- - 22 mcg/mL after 12 hours Significant Cultures:  
Blood culture taken Surgical culture taken Few streptococci, beta hemolytic group B Rare staphylococcus aureus Anaerobic culture taken Fungus culture taken Renal function stable? (unstable defined as SCr increase of 0.5 mg/dL or > 50% increase from baseline, whichever is greater) (Y/N): Y  
 
CAPD, Hemodialysis or Renal Replacement Therapy (Y/N): N Recent Labs 10/09/18 
 0515  10/08/18 
 2160 CREA  0.63  0.67  
BUN  7  5* WBC   --   7.6 Temp (24hrs), Av.6 °F (37 °C), Min:98.5 °F (36.9 °C), Max:98.8 °F (37.1 °C) Creatinine Clearance (Creatinine Clearance (ml/min)): Estimated Creatinine Clearance: 169 mL/min (based on Cr of 0.63). Regimen assessment: Trough elevated to 22 mcg/mL, will decrease maintenance dose form 1250 mg to 1000 mg IV every 12 hours Maintenance dose: 1,000 mg every 12 hours Next scheduled level: 10- at 1430 Pharmacy will follow daily and adjust medications as appropriate for renal function and/or serum levels. Thank you, Sofia Ann Sor

## 2018-10-10 NOTE — PROGRESS NOTES
Problem: Falls - Risk of 
Goal: *Absence of Falls Document Brigette Arteaga Fall Risk and appropriate interventions in the flowsheet. Outcome: Progressing Towards Goal 
Fall Risk Interventions: 
Mobility Interventions: Bed/chair exit alarm, Patient to call before getting OOB Medication Interventions: Patient to call before getting OOB, Teach patient to arise slowly Elimination Interventions: Call light in reach Problem: Pressure Injury - Risk of 
Goal: *Prevention of pressure injury Document Rajesh Scale and appropriate interventions in the flowsheet. Outcome: Progressing Towards Goal 
Pressure Injury Interventions: 
Sensory Interventions: Assess changes in LOC, Keep linens dry and wrinkle-free, Check visual cues for pain Moisture Interventions: Absorbent underpads Activity Interventions: Pressure redistribution bed/mattress(bed type), Increase time out of bed Mobility Interventions: HOB 30 degrees or less, Pressure redistribution bed/mattress (bed type) Nutrition Interventions: Document food/fluid/supplement intake, Offer support with meals,snacks and hydration

## 2018-10-10 NOTE — PROGRESS NOTES
Transfer from [facility]. Surgical pt arrived to room 3024. Pt AxOx4. On room air. Lung sounds are clear. Pt had 2nd toe on right foot amputated. Foot wrapped in ace bandage. No c/o pain. NAD. Dual skin assessment done with Aristeo Santiago RN. Wife at the bedside. Call light within reach. Will continue to monitor. 2130- due meds given. Pt lying in bed, watching tv. No other needs at this time. 2241- due insulin given. 56- due med given. abx hung. 0530- wound dressing on right 2nd toe changed per order. PICC line dressing dirty and coming apart--dressing changed with sterile technique. Pt tolerated well. Bedside shift change report given to Jinny Jara RN (oncoming nurse) by Brandy Coughlin RN (offgoing nurse). Report included the following information SBAR, Kardex, Intake/Output, MAR, Accordion and Recent Results.

## 2018-10-10 NOTE — CDMP QUERY
Please clarify if this patient is being treated/managed for: 
 
=>Diabetes Mellitis with Hyperglycemia as evidenced by - HBA1C 10.8.  
  =>Other Explanation of clinical findings =>Unable to Determine (no explanation of clinical findings) The medical record reflects the following: 
 
Risk:Uncontrolled DM 2 Clinical Indicators:PN states--Uncontrolled DM - HBA1C 10.8. Increase lantus **10/5-Bloodglucose=258 
**10/6-Glucose=216   
 
Treatment: insulin adjustment Please clarify and document your clinical opinion in the progress notes and discharge summary including the definitive and/or presumptive diagnosis, (suspected or probable), related to the above clinical findings. Please include clinical findings supporting your diagnosis. If you DECLINE this query or would like to communicate with [facility], please utilize the \"[employer] message box\" at the TOP of the Progress Note on the right. Thank you, Octavio Damon RN [facility] 
[phone]

## 2018-10-10 NOTE — CONSULTS
INFECTIOUS DISEASE CONSULT NOTE       Requested by: Dr Trini Zarco    Reason for consult: OM of second toe    Date of admission: 10/5/2018    Date of consult: October 10, 2018      ABX:     Current abx Prior abx    vanco 10/5-4 Zosyn10/5-4      ASSESSMENT: -- RECOMMENDATIONS      Sepsis POA- suspect sec to cellulitis and abscess/OM of second toe - Improving  - afebrile now and with normal WBC  - will need to dc central line as at risk for BSI   Rt DFU involving second toe- OM and ?abscess and cellulitis  - Xray reviewed and concerning for second DIP joint infection  - ESR 39 and   - S/p amputation at MTP joint, Cx BHS, SA  - Path s/p acute and chronic OM but was sent from toe itself and not clear margin so positive as expected - hopefully Sx should have taken care of infection and pt won't need long term IV Abx  - Await for clinical improvement and further decision from podiatry regarding need for further sx vs wd vac vs closure  - will definitely cover with IV Abx till then  - Will continue Vanco to cover Staph and Strep and will be able to modify based on final Cx  - DC zosyn for now     DM2- Uncontrolled- HbA1c of >9 - Needs better control of sugars for better wd healing and prevention of subsequent infections   H/o Sarcoidosis- on prednisone - Immunocompromised host  - at risk for infection and poor healing on steroids   Erythema nodosum of legs  - suspect sec to sarcoidosis - LWC  - Control of dis   HTN    H/o A fib      MICROBIOLOGY:     10/5 Blcx x2 ntd  10/6 Tissue Cx BHS, SA    LINES/DRAINS:     Rt neck Central line 10/5    HPI:     Mr Fadi Weber is a 41 y.o. male with PMHx significant for diabetes, A fib, HTN and sarcoidosis who came to ED 10/5 with c/o worsening Rt second toe swelling and pain. Pt says developed neuropathy in legs over last few years.  He recently cleaned his toe \"too much\" around 6 weeks ago and developed sig redness and swelling of second toe. Pt was seen at [place] about a month ago and was advised to be admitted with iv antibiotics but pt refused. His foot got worse with involvement of whole leg and also affected his leg lesions from sarcoidosis. He developed myalgia, chills, sweats, fever over the past few days PTA and hence came to ED. He was found with sepsis with fever, leucocytosis, and hypotension. He was admitted in ICU and required IVF and pressors. He was also given steroids as chronically on same for his sarcoidosis. Xray of foot s/o early OM of DIP second toe of rt foot. Pt was seen by Podiatry and underwent amputation of second toe. OR note mentioned healthy metatarsal head. Cx grew BHS and SA and path showed acute on ch OM and with extensive gangrenous changes. Pt was transfered out of ICU. Podiatry planning to re-evalaute and see if needs further sx. We were asked for further eval and management. Past medical history:     Past Medical History:   Diagnosis Date    Diabetes ([facility]     Hypercholesteremia     Myocardial infarct ([facility]     Sarcoidosis        Social History:     Social History     Social History    Marital status:      Spouse name: N/A    Number of children: N/A    Years of education: N/A     Occupational History    Not on file. Social History Main Topics    Smoking status: Current Every Day Smoker     Packs/day: 0.50    Smokeless tobacco: Never Used    Alcohol use No    Drug use: No    Sexual activity: Not on file     Other Topics Concern    Not on file     Social History Narrative       Family History:   History reviewed. No pertinent family history. Allergies:   No Known Allergies      Home Medications:     Prescriptions Prior to Admission   Medication Sig    gemfibrozil (LOPID) 600 mg tablet Take 600 mg by mouth two (2) times a day.  GABAPENTIN PO Take  by mouth.  aspirin (ASPIRIN) 325 mg tablet Take 325 mg by mouth daily.     metroNIDAZOLE (FLAGYL) 500 mg tablet Take 1 Tab by mouth three (3) times daily.  ciprofloxacin HCl (CIPRO) 250 mg tablet Take 1 Tab by mouth every twelve (12) hours.  insulin detemir (LEVEMIR) 100 unit/mL injection 0.45 mL by SubCUTAneous route nightly.  albuterol (PROVENTIL HFA, VENTOLIN HFA, PROAIR HFA) 90 mcg/actuation inhaler Take 2 Puffs by inhalation every four (4) hours as needed for Wheezing.  dextran 70-hypromellose (ARTIFICIAL TEARS) ophthalmic solution Administer 2 Drops to both eyes as needed.  glipiZIDE (GLUCOTROL) 10 mg tablet Take 10 mg by mouth two (2) times a day. Indications: TYPE 2 DIABETES MELLITUS    metoprolol tartrate (LOPRESSOR) 50 mg tablet Take 50 mg by mouth two (2) times a day.  omeprazole (PRILOSEC) 20 mg capsule Take 20 mg by mouth daily.  predniSONE (DELTASONE) 10 mg tablet Take 25 mg by mouth daily (with breakfast). Indications: SARCOIDOSIS    venlafaxine-SR (EFFEXOR-XR) 75 mg capsule Take  by mouth daily. Indications: POST TRAUMATIC STRESS DISORDER    sildenafil citrate (VIAGRA) 100 mg tablet Take 100 mg by mouth as needed.        Current Medications:     Current Facility-Administered Medications   Medication Dose Route Frequency    insulin lispro (HUMALOG) injection 3 Units  3 Units SubCUTAneous TIDAC    insulin glargine (LANTUS) injection 10 Units  10 Units SubCUTAneous ONCE    insulin glargine (LANTUS) injection 27 Units  27 Units SubCUTAneous QHS    vancomycin (VANCOCIN) 1,000 mg in 0.9% sodium chloride (MBP/ADV) 250 mL adv  1,000 mg IntraVENous Q12H    [START ON 10/12/2018] VANCOMYCIN INFORMATION NOTE   Other ONCE    insulin lispro (HUMALOG) injection   SubCUTAneous AC&HS    venlafaxine (EFFEXOR) tablet 75 mg  75 mg Oral QHS    pantoprazole (PROTONIX) tablet 40 mg  40 mg Oral QHS    acetaminophen (TYLENOL) tablet 650 mg  650 mg Oral Q6H PRN    ondansetron (ZOFRAN) injection 4 mg  4 mg IntraVENous Q6H PRN    heparin (porcine) injection 5,000 Units  5,000 Units SubCUTAneous Q8H    hydrALAZINE (APRESOLINE) 20 mg/mL injection 10 mg  10 mg IntraVENous Q6H PRN    predniSONE (DELTASONE) tablet 10 mg  10 mg Oral DAILY WITH BREAKFAST    morphine injection 2 mg  2 mg IntraVENous Q4H PRN    sodium chloride (NS) flush 5-10 mL  5-10 mL IntraVENous Q8H    sodium chloride (NS) flush 5-10 mL  5-10 mL IntraVENous PRN    oxyCODONE-acetaminophen (PERCOCET) 5-325 mg per tablet 2 Tab  2 Tab Oral Q4H PRN    metoprolol tartrate (LOPRESSOR) tablet 50 mg  50 mg Oral Q12H    glucose chewable tablet 16 g  16 g Oral PRN    glucagon (GLUCAGEN) injection 1 mg  1 mg IntraMUSCular PRN    dextrose (D50) infusion 12.5-25 g  25-50 mL IntraVENous PRN    influenza vaccine - (6 mos+)(PF) (FLUARIX QUAD/FLULAVAL QUAD) injection 0.5 mL  0.5 mL IntraMUSCular PRIOR TO DISCHARGE    sodium chloride (NS) flush 5-10 mL  5-10 mL IntraVENous PRN    VANCOMYCIN INFORMATION NOTE   Other Rx Dosing/Monitoring       Review of Systems:   12 points ROS done. Pertinent positives and negatives are as follows, ROS otherwise negative. Constitutional: resolved fever, chills, myalgia. HENT: Negative for ear pain, congestion, sore throat, rhinorrhea. Eyes: Negative for pain, redness and visual disturbance. Respiratory: negative for shortness of breath, cough, chest tightness, wheezing. Cardiovascular: Negative for chest pain, palpitations and leg swelling. Gastrointestinal: Negative for nausea, vomiting, abdominal pain or diarrhea  Genitourinary: Negative for dysuria   Musculoskeletal: +ve rt foot pain   Skin: +ve for lesions over his legs, rt second toe pain and swelling, redness of leg- now better   Neurological: Negative for dizziness, syncope, light-headedness or headaches. Hematological:Negative for easy bruising or bleeding. Psychiatric/Behavioral: Negative anxiety, depression.       Physical Exam:  Vitals  Temp (24hrs), Av.6 °F (37 °C), Min:98.5 °F (36.9 °C), Max:98.8 °F (37.1 °C)    Visit Vitals    BP (!) 157/94 (BP 1 Location: Left arm, BP Patient Position: Supine)    Pulse 84    Temp 98.5 °F (36.9 °C)    Resp 18    Ht 6' 2\" (1.88 m)    Wt 80.7 kg (178 lb)    SpO2 98%    BMI 22.85 kg/m2       General: Well-developed, 41y.o. year-old, male, in no acute distress  HEENT: Normocephalic, anicteric sclerae, Pupils equal, round reactive to light, no oropharyngeal lesions. No sinus tenderness. Neck: Supple, no lymphadenopathy, masses or thyromegaly  Chest: Symmetrical expansion  Lungs: Clear to auscultation bilaterally, no dullness  Heart: Regular rhythm, no murmurs, rubs or gallops, No JVD  Abdomen: Soft, non-tender,non distended, no organomegaly, BS+  Musculoskeletal: Normal strength/tone. No edema. No clubbing or cyanosis  CNS: AAOx3. Cranial nerves II-XII intact. Grossly normal.No NR  SKIN: b/l LE raised plaque like lesions, no active dx, non tender, Rt foot in dressing, second toe amputated and site with red granulating tissue          Labs: Results:   Chemistry Recent Labs      10/09/18   0515  10/08/18   0737   GLU  96  106*   NA  144  143   K  3.9  3.2*   CL  116*  114*   CO2  17*  21   BUN  7  5*   CREA  0.63  0.67   CA  7.7*  7.3*   AGAP  11  8   BUCR  11*  7*   AP   --   194*   TP   --   5.3*   ALB   --   1.8*   GLOB   --   3.5   AGRAT   --   0.5*      CBC w/Diff Recent Labs      10/08/18   0737   WBC  7.6   RBC  3.62*   HGB  10.5*   HCT  31.0*   PLT  159   GRANS  80*   LYMPH  10*   EOS  1      Microbiology No results for input(s): CULT in the last 72 hours. Imaging-      Results from Hospital Encounter encounter on 10/05/18   XR CHEST PORT   Narrative EXAM: Portable chest    HISTORY: Chest pain and chills. Shortness of breath. COMPARISON: 10/5/2018    TECHNIQUE: Single portable view. _______________    FINDINGS:    Cardiac size is normal.  The lungs are clear.   Pulmonary vasculature is normal.   The visualized bony structures are normal. Right central venous catheter tip in  mid superior vena cava.    _______________         Impression IMPRESSION:    No evidence of active pulmonary process. Interval insertion right central venous  catheter. No pneumothorax. Results from [facility] encounter on 05/13/16   CT ABD PELV WO CONT   Narrative Procedure:  CT Abdomen, Pelvis without Contrast.    Indication:  Nausea, vomiting, diarrhea. Comparison:  None. Technique:  Helical volumetric CT imaging of the abdomen and pelvis is performed  at 5 mm axial sections without IV or oral contrast administration. Coronal and  sagittal multiplanar reconstruction images are generated for improved anatomic  delineation. Radiation dose:   mGy-cm. Findings:    Abdomen --    Lung Bases:  Subtle groundglass nodular densities are noted in the right lung  base region. Nonspecific but probably favorable to postinflammatory changes. In the right lower lobe, there is a focal area of stellate density, at the  convergent point of the right lower lobe bronchovascular structures (axial  #9-10), measuring 1.2 x 0.7 cm. Most likely representing a focal area of  scarring. Developing neoplastic process is felt far less likely but cannot be  excluded entirely. Correlation with any available prior studies is recommended. Liver:  Pronounced sclerotic changes. Pancreas:  Fatty replacement of the pancreatic head, neck and proximal body. There is a suggestion of a possible nodular structure at the body of the  pancreas measuring 1.0 x 0.7 cm (axial #32). Spleen, Gallbladder, Adrenal Glands:  Unremarkable. Kidneys:  Residual contrast is noted in the renal pelvis bilaterally (reportedly  the patient has had CT scan earlier in the day at [facility] -  unavailable for review at this time). The kidneys appear grossly unremarkable  otherwise. Gastrointestinal Tract, Abdominal Wall, Mesentery:      - Unremarkable stomach. - No acute findings along the small bowel.   No small bowel obstruction.    - Normal appendix.    - No acute diverticulitis or colitis. A few scattered diverticula are noted. The colon lumen contains fluid nearly throughout the length of the colon,  presumably related to the patient's history of diarrhea. - No mesenteric adenopathy. Retroperitoneum:  A few scattered nodes are identified. The largest  retroperitoneal node measures about 1.8 x 1.1 cm (axial #36). Vascular:  Non-aneurysmal aorta. Pelvis --    Urinary Bladder:  Completely opacified with excreted contrast-containing urine. The bladder appears unremarkable. Prostate:  Unremarkable. Rectum:  Mildly distended with fluid. Pelvic sidewall:  No adenopathy. No free fluid. In the right inguinal canal,  an ovoid intermediate attenuation structure is identified, measuring 3.3 x 3.0 x  5.5 cm. Most likely representing a retracted right testicle. The left testicle  appears to be located within the left scrotal sac, measuring 3.5 x 3.1 x 4.6 cm. Bones:  No acute osseous findings. Impression Impression:    1. Pronounced cirrhotic changes. 2.  Pancreatic nodule in the body segment (7.4 cm from the splenic hilum region)  with the background of fatty infiltration of the pancreas. Recommend  multiphasic CT or MR for further delineation on routine basis. 3.  Colon filled with fluid. Rectum mildly distended with fluid. Probably  related to diarrhea. 4.  Probable scar tissue in the right lower lobe. Doubtful for neoplastic  process. Recommend comparison with prior studies from outside sources to  ascertain stability. If no priors are available, CT follow-up is recommended to  ascertain stability.      5.  Retracted right testicle.          ---------------------------------------------------------------------------------------------------------------  I have independently examined the patient and reviewed all lab studies and imgaing as well as review of nursing notes Mireya Abreu MD  10/10/2018    [facility] AT THE [facility]  [phone]

## 2018-10-11 ENCOUNTER — ANESTHESIA EVENT (OUTPATIENT)
Dept: SURGERY | Age: 48
DRG: 853 | End: 2018-10-11
Payer: COMMERCIAL

## 2018-10-11 ENCOUNTER — ANESTHESIA (OUTPATIENT)
Dept: SURGERY | Age: 48
DRG: 853 | End: 2018-10-11
Payer: COMMERCIAL

## 2018-10-11 LAB
ANION GAP SERPL CALC-SCNC: 11 MMOL/L (ref 3–18)
BACTERIA SPEC CULT: NORMAL
BASOPHILS # BLD: 0 K/UL (ref 0–0.1)
BASOPHILS NFR BLD: 0 % (ref 0–2)
BUN SERPL-MCNC: 5 MG/DL (ref 7–18)
BUN/CREAT SERPL: 8 (ref 12–20)
CALCIUM SERPL-MCNC: 8.4 MG/DL (ref 8.5–10.1)
CHLORIDE SERPL-SCNC: 106 MMOL/L (ref 100–108)
CO2 SERPL-SCNC: 23 MMOL/L (ref 21–32)
CREAT SERPL-MCNC: 0.61 MG/DL (ref 0.6–1.3)
DIFFERENTIAL METHOD BLD: ABNORMAL
EOSINOPHIL # BLD: 0.1 K/UL (ref 0–0.4)
EOSINOPHIL NFR BLD: 1 % (ref 0–5)
ERYTHROCYTE [DISTWIDTH] IN BLOOD BY AUTOMATED COUNT: 13.6 % (ref 11.6–14.5)
ERYTHROCYTE [SEDIMENTATION RATE] IN BLOOD: 77 MM/HR (ref 0–15)
GLUCOSE BLD STRIP.AUTO-MCNC: 113 MG/DL (ref 70–110)
GLUCOSE BLD STRIP.AUTO-MCNC: 140 MG/DL (ref 70–110)
GLUCOSE BLD STRIP.AUTO-MCNC: 200 MG/DL (ref 70–110)
GLUCOSE BLD STRIP.AUTO-MCNC: 310 MG/DL (ref 70–110)
GLUCOSE BLD STRIP.AUTO-MCNC: 85 MG/DL (ref 70–110)
GLUCOSE SERPL-MCNC: 106 MG/DL (ref 74–99)
HCT VFR BLD AUTO: 33.4 % (ref 36–48)
HGB BLD-MCNC: 11.1 G/DL (ref 13–16)
LYMPHOCYTES # BLD: 1 K/UL (ref 0.9–3.6)
LYMPHOCYTES NFR BLD: 13 % (ref 21–52)
MCH RBC QN AUTO: 28.7 PG (ref 24–34)
MCHC RBC AUTO-ENTMCNC: 33.2 G/DL (ref 31–37)
MCV RBC AUTO: 86.3 FL (ref 74–97)
MONOCYTES # BLD: 0.1 K/UL (ref 0.05–1.2)
MONOCYTES NFR BLD: 2 % (ref 3–10)
NEUTS SEG # BLD: 6.5 K/UL (ref 1.8–8)
NEUTS SEG NFR BLD: 84 % (ref 40–73)
PLATELET # BLD AUTO: 220 K/UL (ref 135–420)
PMV BLD AUTO: 9.6 FL (ref 9.2–11.8)
POTASSIUM SERPL-SCNC: 3.6 MMOL/L (ref 3.5–5.5)
RBC # BLD AUTO: 3.87 M/UL (ref 4.7–5.5)
SERVICE CMNT-IMP: NORMAL
SODIUM SERPL-SCNC: 140 MMOL/L (ref 136–145)
WBC # BLD AUTO: 7.7 K/UL (ref 4.6–13.2)

## 2018-10-11 PROCEDURE — 90471 IMMUNIZATION ADMIN: CPT

## 2018-10-11 PROCEDURE — 74011250637 HC RX REV CODE- 250/637: Performed by: INTERNAL MEDICINE

## 2018-10-11 PROCEDURE — 76937 US GUIDE VASCULAR ACCESS: CPT | Performed by: INTERNAL MEDICINE

## 2018-10-11 PROCEDURE — 36569 INSJ PICC 5 YR+ W/O IMAGING: CPT | Performed by: INTERNAL MEDICINE

## 2018-10-11 PROCEDURE — 74011250637 HC RX REV CODE- 250/637: Performed by: NURSE PRACTITIONER

## 2018-10-11 PROCEDURE — 90686 IIV4 VACC NO PRSV 0.5 ML IM: CPT | Performed by: INTERNAL MEDICINE

## 2018-10-11 PROCEDURE — 74011250637 HC RX REV CODE- 250/637: Performed by: HOSPITALIST

## 2018-10-11 PROCEDURE — 74011250636 HC RX REV CODE- 250/636: Performed by: HOSPITALIST

## 2018-10-11 PROCEDURE — 36415 COLL VENOUS BLD VENIPUNCTURE: CPT | Performed by: INTERNAL MEDICINE

## 2018-10-11 PROCEDURE — 77030018786 HC NDL GD F/USND BARD -B

## 2018-10-11 PROCEDURE — 74011636637 HC RX REV CODE- 636/637: Performed by: INTERNAL MEDICINE

## 2018-10-11 PROCEDURE — 74011250636 HC RX REV CODE- 250/636: Performed by: INTERNAL MEDICINE

## 2018-10-11 PROCEDURE — 74011636637 HC RX REV CODE- 636/637: Performed by: HOSPITALIST

## 2018-10-11 PROCEDURE — 85025 COMPLETE CBC W/AUTO DIFF WBC: CPT | Performed by: NURSE PRACTITIONER

## 2018-10-11 PROCEDURE — 80048 BASIC METABOLIC PNL TOTAL CA: CPT | Performed by: NURSE PRACTITIONER

## 2018-10-11 PROCEDURE — 65270000029 HC RM PRIVATE

## 2018-10-11 PROCEDURE — C1751 CATH, INF, PER/CENT/MIDLINE: HCPCS

## 2018-10-11 PROCEDURE — 82962 GLUCOSE BLOOD TEST: CPT

## 2018-10-11 PROCEDURE — 85652 RBC SED RATE AUTOMATED: CPT | Performed by: INTERNAL MEDICINE

## 2018-10-11 RX ORDER — BACITRACIN 500 UNIT/G
1 PACKET (EA) TOPICAL AS NEEDED
Status: DISCONTINUED | OUTPATIENT
Start: 2018-10-11 | End: 2018-10-17 | Stop reason: HOSPADM

## 2018-10-11 RX ORDER — CEFAZOLIN SODIUM 2 G/50ML
2 SOLUTION INTRAVENOUS EVERY 8 HOURS
Status: DISCONTINUED | OUTPATIENT
Start: 2018-10-11 | End: 2018-10-17 | Stop reason: HOSPADM

## 2018-10-11 RX ORDER — SODIUM CHLORIDE 0.9 % (FLUSH) 0.9 %
20 SYRINGE (ML) INJECTION EVERY 24 HOURS
Status: DISCONTINUED | OUTPATIENT
Start: 2018-10-11 | End: 2018-10-17 | Stop reason: HOSPADM

## 2018-10-11 RX ORDER — LOPERAMIDE HYDROCHLORIDE 2 MG/1
4 CAPSULE ORAL
Status: DISCONTINUED | OUTPATIENT
Start: 2018-10-11 | End: 2018-10-17 | Stop reason: HOSPADM

## 2018-10-11 RX ORDER — SODIUM CHLORIDE 0.9 % (FLUSH) 0.9 %
10 SYRINGE (ML) INJECTION AS NEEDED
Status: DISCONTINUED | OUTPATIENT
Start: 2018-10-11 | End: 2018-10-17 | Stop reason: HOSPADM

## 2018-10-11 RX ORDER — SODIUM CHLORIDE 0.9 % (FLUSH) 0.9 %
10-40 SYRINGE (ML) INJECTION EVERY 8 HOURS
Status: DISCONTINUED | OUTPATIENT
Start: 2018-10-11 | End: 2018-10-17 | Stop reason: HOSPADM

## 2018-10-11 RX ORDER — SODIUM CHLORIDE 0.9 % (FLUSH) 0.9 %
10-30 SYRINGE (ML) INJECTION AS NEEDED
Status: DISCONTINUED | OUTPATIENT
Start: 2018-10-11 | End: 2018-10-17 | Stop reason: HOSPADM

## 2018-10-11 RX ORDER — SODIUM CHLORIDE 0.9 % (FLUSH) 0.9 %
10 SYRINGE (ML) INJECTION EVERY 24 HOURS
Status: DISCONTINUED | OUTPATIENT
Start: 2018-10-11 | End: 2018-10-13

## 2018-10-11 RX ADMIN — Medication 10 ML: at 22:25

## 2018-10-11 RX ADMIN — HYDRALAZINE HYDROCHLORIDE 10 MG: 20 INJECTION INTRAMUSCULAR; INTRAVENOUS at 09:08

## 2018-10-11 RX ADMIN — HEPARIN SODIUM 5000 UNITS: 5000 INJECTION INTRAVENOUS; SUBCUTANEOUS at 22:39

## 2018-10-11 RX ADMIN — Medication 10 ML: at 09:06

## 2018-10-11 RX ADMIN — PANTOPRAZOLE SODIUM 40 MG: 40 TABLET, DELAYED RELEASE ORAL at 22:23

## 2018-10-11 RX ADMIN — LOPERAMIDE HYDROCHLORIDE 4 MG: 2 CAPSULE ORAL at 18:01

## 2018-10-11 RX ADMIN — VENLAFAXINE HYDROCHLORIDE 75 MG: 37.5 TABLET ORAL at 22:23

## 2018-10-11 RX ADMIN — CEFAZOLIN SODIUM 2 G: 2 SOLUTION INTRAVENOUS at 06:38

## 2018-10-11 RX ADMIN — INFLUENZA VIRUS VACCINE 0.5 ML: 15; 15; 15; 15 SUSPENSION INTRAMUSCULAR at 12:22

## 2018-10-11 RX ADMIN — PREDNISONE 10 MG: 5 TABLET ORAL at 09:06

## 2018-10-11 RX ADMIN — CEFAZOLIN SODIUM 2 G: 2 SOLUTION INTRAVENOUS at 15:15

## 2018-10-11 RX ADMIN — Medication 10 ML: at 15:27

## 2018-10-11 RX ADMIN — SODIUM CHLORIDE 1000 MG: 900 INJECTION, SOLUTION INTRAVENOUS at 04:16

## 2018-10-11 RX ADMIN — METOPROLOL TARTRATE 50 MG: 50 TABLET ORAL at 09:06

## 2018-10-11 RX ADMIN — HEPARIN SODIUM 5000 UNITS: 5000 INJECTION INTRAVENOUS; SUBCUTANEOUS at 15:15

## 2018-10-11 RX ADMIN — INSULIN LISPRO 6 UNITS: 100 INJECTION, SOLUTION INTRAVENOUS; SUBCUTANEOUS at 17:57

## 2018-10-11 RX ADMIN — HEPARIN SODIUM 5000 UNITS: 5000 INJECTION INTRAVENOUS; SUBCUTANEOUS at 06:39

## 2018-10-11 RX ADMIN — INSULIN LISPRO 12 UNITS: 100 INJECTION, SOLUTION INTRAVENOUS; SUBCUTANEOUS at 22:24

## 2018-10-11 RX ADMIN — INSULIN GLARGINE 27 UNITS: 100 INJECTION, SOLUTION SUBCUTANEOUS at 22:24

## 2018-10-11 RX ADMIN — Medication 10 ML: at 15:26

## 2018-10-11 RX ADMIN — Medication 20 ML: at 15:27

## 2018-10-11 RX ADMIN — CEFAZOLIN SODIUM 2 G: 2 SOLUTION INTRAVENOUS at 22:24

## 2018-10-11 RX ADMIN — LOPERAMIDE HYDROCHLORIDE 4 MG: 2 CAPSULE ORAL at 22:39

## 2018-10-11 RX ADMIN — INSULIN LISPRO 3 UNITS: 100 INJECTION, SOLUTION INTRAVENOUS; SUBCUTANEOUS at 17:58

## 2018-10-11 RX ADMIN — METOPROLOL TARTRATE 75 MG: 50 TABLET ORAL at 22:23

## 2018-10-11 NOTE — PROGRESS NOTES
INFECTIOUS DISEASE PROGRESS NOTE ABX:  
 
Current abx Prior abx Ancef     10/10  1 Zosyn10/5-4 ;vanco 10/5-6 ASSESSMENT: -- RECOMMENDATIONS Sepsis POA- suspect sec to cellulitis and abscess/OM of second toe - clinical features resolved 
- afebrile now and with normal WBC 
-  need to dc central line as at risk for BSI Rt DFU involving second toe- OM and ?abscess and cellulitis: MSSA, GBS 
- Xray reviewed and concerning for second DIP joint infection - ESR 39 and  - S/p amputation at MTP joint, Cx BHS, SA 
- Path s/p acute and chronic OM but was sent from toe itself and not clear margin so positive as expected- cannot ro residual involvement of infected margin - needs PICC- ordered for today 
 
-ancef 2g iv q 8h through 11/18  
- weekly lab- cbc/diff, bmp and esr 
-fu with ID 2 weeks post dc - wrote [facility] orders DM2- Uncontrolled- HbA1c of >9 - Needs better control of sugars for better wd healing and prevention of subsequent infections H/o Sarcoidosis- on prednisone - Immunocompromised host 
- at risk for infection and poor healing on steroids Erythema nodosum of legs 
- suspect sec to sarcoidosis - LWC 
- Control of dis HTN   
H/o A fib MICROBIOLOGY:  
 
10/5 Blcx x2 ntd final 
10/6 Tissue Cx BHS, MSSA LINES/DRAINS:  
 
Rt neck Central line 10/5 Cash Bello Order for PICC  10/11 Past medical history:  
 
Past Medical History:  
Diagnosis Date  Diabetes ([facility]  Hypercholesteremia  Myocardial infarct ([facility]  Sarcoidosis Allergies:  
No Known Allergies Current Medications:  
 
Current Facility-Administered Medications Medication Dose Route Frequency  insulin lispro (HUMALOG) injection 3 Units  3 Units SubCUTAneous TIDAC  insulin glargine (LANTUS) injection 27 Units  27 Units SubCUTAneous QHS  vancomycin (VANCOCIN) 1,000 mg in 0.9% sodium chloride (MBP/ADV) 250 mL adv  1,000 mg IntraVENous Q12H  
 [START ON 10/12/2018] VANCOMYCIN INFORMATION NOTE   Other ONCE  
 insulin lispro (HUMALOG) injection   SubCUTAneous AC&HS  venlafaxine (EFFEXOR) tablet 75 mg  75 mg Oral QHS  pantoprazole (PROTONIX) tablet 40 mg  40 mg Oral QHS  acetaminophen (TYLENOL) tablet 650 mg  650 mg Oral Q6H PRN  
 ondansetron (ZOFRAN) injection 4 mg  4 mg IntraVENous Q6H PRN  
 heparin (porcine) injection 5,000 Units  5,000 Units SubCUTAneous Q8H  
 hydrALAZINE (APRESOLINE) 20 mg/mL injection 10 mg  10 mg IntraVENous Q6H PRN  predniSONE (DELTASONE) tablet 10 mg  10 mg Oral DAILY WITH BREAKFAST  morphine injection 2 mg  2 mg IntraVENous Q4H PRN  
 sodium chloride (NS) flush 5-10 mL  5-10 mL IntraVENous Q8H  
 sodium chloride (NS) flush 5-10 mL  5-10 mL IntraVENous PRN  
 oxyCODONE-acetaminophen (PERCOCET) 5-325 mg per tablet 2 Tab  2 Tab Oral Q4H PRN  
 metoprolol tartrate (LOPRESSOR) tablet 50 mg  50 mg Oral Q12H  
 glucose chewable tablet 16 g  16 g Oral PRN  
 glucagon (GLUCAGEN) injection 1 mg  1 mg IntraMUSCular PRN  
 dextrose (D50) infusion 12.5-25 g  25-50 mL IntraVENous PRN  
 influenza vaccine - (6 mos+)(PF) (FLUARIX QUAD/FLULAVAL QUAD) injection 0.5 mL  0.5 mL IntraMUSCular PRIOR TO DISCHARGE  sodium chloride (NS) flush 5-10 mL  5-10 mL IntraVENous PRN  
 VANCOMYCIN INFORMATION NOTE   Other Rx Dosing/Monitoring Physical Exam: 
Vitals Temp (24hrs), Av.4 °F (36.9 °C), Min:98.2 °F (36.8 °C), Max:98.5 °F (36.9 °C) Visit Vitals  /83  Pulse 66  Temp 98.2 °F (36.8 °C)  Resp 18  Ht 6' 2\" (1.88 m)  Wt 80.7 kg (178 lb)  SpO2 98%  BMI 22.85 kg/m2 HEENT: no thrush Lungs clear Heart RRR Rt foot bandaged- D/I Labs: Results:  
Chemistry Recent Labs 10/09/18 
 0515  10/08/18 
 5013 GLU  96  106* NA  144  143  
K  3.9  3.2*  
CL  116*  114* CO2  17*  21 BUN  7  5* CREA  0.63  0.67 CA  7.7*  7.3*  
 AGAP  11  8 BUCR  11*  7* AP   --   194* TP   --   5.3* ALB   --   1.8*  
GLOB   --   3.5 AGRAT   --   0.5* CBC w/Diff Recent Labs 10/08/18 
 9368 WBC  7.6 RBC  3.62* HGB  10.5* HCT  31.0*  
PLT  159 GRANS  80* LYMPH  10* EOS  1 Microbiology No results for input(s): CULT in the last 72 hours. Imaging- 
 
 
Results from Hospital Encounter encounter on 10/05/18 XR CHEST PORT Narrative EXAM: Portable chest 
 
HISTORY: Chest pain and chills. Shortness of breath. COMPARISON: 10/5/2018 TECHNIQUE: Single portable view. _______________ FINDINGS: 
 
Cardiac size is normal.  The lungs are clear. Pulmonary vasculature is normal.  
The visualized bony structures are normal. Right central venous catheter tip in 
mid superior vena cava. 
 
_______________ Impression IMPRESSION: 
 
No evidence of active pulmonary process. Interval insertion right central venous 
catheter. No pneumothorax. Results from Hospital Encounter encounter on 05/13/16 CT ABD PELV WO CONT Narrative Procedure:  CT Abdomen, Pelvis without Contrast. 
 
Indication:  Nausea, vomiting, diarrhea. Comparison:  None. Technique:  Helical volumetric CT imaging of the abdomen and pelvis is performed 
at 5 mm axial sections without IV or oral contrast administration. Coronal and 
sagittal multiplanar reconstruction images are generated for improved anatomic 
delineation. Radiation dose:   mGy-cm. Findings: Abdomen -- 
 
Lung Bases:  Subtle groundglass nodular densities are noted in the right lung 
base region. Nonspecific but probably favorable to postinflammatory changes. In the right lower lobe, there is a focal area of stellate density, at the 
convergent point of the right lower lobe bronchovascular structures (axial 
#9-10), measuring 1.2 x 0.7 cm. Most likely representing a focal area of 
scarring.   Developing neoplastic process is felt far less likely but cannot be 
excluded entirely. Correlation with any available prior studies is recommended. Liver:  Pronounced sclerotic changes. Pancreas:  Fatty replacement of the pancreatic head, neck and proximal body. There is a suggestion of a possible nodular structure at the body of the 
pancreas measuring 1.0 x 0.7 cm (axial #32). Spleen, Gallbladder, Adrenal Glands:  Unremarkable. Kidneys:  Residual contrast is noted in the renal pelvis bilaterally (reportedly 
the patient has had CT scan earlier in the day at [facility] - 
unavailable for review at this time). The kidneys appear grossly unremarkable 
otherwise. Gastrointestinal Tract, Abdominal Wall, Mesentery: - Unremarkable stomach. - No acute findings along the small bowel. No small bowel obstruction.   
- Normal appendix.   
- No acute diverticulitis or colitis. A few scattered diverticula are noted. The colon lumen contains fluid nearly throughout the length of the colon, 
presumably related to the patient's history of diarrhea. - No mesenteric adenopathy. Retroperitoneum:  A few scattered nodes are identified. The largest 
retroperitoneal node measures about 1.8 x 1.1 cm (axial #36). Vascular:  Non-aneurysmal aorta. Pelvis -- 
 
Urinary Bladder:  Completely opacified with excreted contrast-containing urine. The bladder appears unremarkable. Prostate:  Unremarkable. Rectum:  Mildly distended with fluid. Pelvic sidewall:  No adenopathy. No free fluid. In the right inguinal canal, 
an ovoid intermediate attenuation structure is identified, measuring 3.3 x 3.0 x 
5.5 cm. Most likely representing a retracted right testicle. The left testicle 
appears to be located within the left scrotal sac, measuring 3.5 x 3.1 x 4.6 cm. Bones:  No acute osseous findings. Impression Impression: 1. Pronounced cirrhotic changes.   
 
2.  Pancreatic nodule in the body segment (7.4 cm from the splenic hilum region) 
with the background of fatty infiltration of the pancreas. Recommend 
multiphasic CT or MR for further delineation on routine basis. 3.  Colon filled with fluid. Rectum mildly distended with fluid. Probably 
related to diarrhea. 4.  Probable scar tissue in the right lower lobe. Doubtful for neoplastic 
process. Recommend comparison with prior studies from outside sources to 
ascertain stability. If no priors are available, CT follow-up is recommended to 
ascertain stability. 5.  Retracted right testicle. 
   
 
 
--------------------------------------------------------------------------------------------------------------- I have independently examined the patient and reviewed all lab studies and imgaing as well as review of nursing notes Angie Ordaz MD,FACP 
10/11/2018 [facility] [phone]

## 2018-10-11 NOTE — ANESTHESIA PREPROCEDURE EVALUATION
Anesthetic History No history of anesthetic complications Review of Systems / Medical History Patient summary reviewed and pertinent labs reviewed Pulmonary Smoker Neuro/Psych Within defined limits Cardiovascular Hypertension CAD Exercise tolerance: <4 METS 
  
GI/Hepatic/Renal 
Within defined limits Endo/Other Diabetes Arthritis Other Findings Physical Exam 
 
Airway Mallampati: II 
TM Distance: 4 - 6 cm Neck ROM: normal range of motion Mouth opening: Normal 
 
 Cardiovascular Regular rate and rhythm,  S1 and S2 normal,  no murmur, click, rub, or gallop Dental 
 
Dentition: Edentulous Pulmonary Breath sounds clear to auscultation Abdominal 
GI exam deferred Other Findings Anesthetic Plan ASA: 3 Anesthesia type: MAC and general - backup Induction: Intravenous Anesthetic plan and risks discussed with: Patient and Family

## 2018-10-11 NOTE — PERIOP NOTES
Called unit and spoke with [facility] RN regarding Pre-procedure check completion prior to OR.

## 2018-10-11 NOTE — PROGRESS NOTES
[address] [place], [place] [address]ty [facility] Hospitalist Division Inpatient Daily Progress Note Patient: Lottie Vila MRN: [id number]  CSN: [id number] YOB: 1970  Age: 41 y.o. Sex: male DOA: 10/5/2018 LOS:  LOS: 6 days Chief Complaint:  Sepsis 2/2 diabetic foot infection Subjective: No events overnight Objective:  
  
Visit Vitals  BP (!) 186/109 (BP 1 Location: Left arm, BP Patient Position: Supine)  Pulse 62  Temp 98 °F (36.7 °C)  Resp 18  Ht 6' 2\" (1.88 m)  Wt 85.3 kg (188 lb)  SpO2 99%  BMI 24.14 kg/m2 Physical Exam: 
General appearance: alert, cooperative, no distress, appears stated age Lungs: clear to auscultation throughout Heart: regular rate and rhythm, S1, S2 normal, no murmur, click, rub or gallop Abdomen: soft, non tender, non distended. Normoactive bowel sounds Extremities: extremities normal, atraumatic, no cyanosis. RLE 2+ edema Skin: Right foot Kerlex dressing CDI Neurologic: Grossly normal 
PSY: Mood and affect normal, appropriately behaved Intake and Output: 
Current Shift:  10/11 0701 - 10/11 1900 In: 0 Out: 500 [Urine:500] Last three shifts:  10/09 1901 - 10/11 0700 In: 3041 [P.O.:1200; I.V.:550] Out: 2050 [Urine:2050] Recent Results (from the past 24 hour(s)) Karen Cast Collection Time: 10/10/18 12:14 PM  
Result Value Ref Range Vancomycin,trough 22.0 (HH) 10.0 - 20.0 ug/mL Reported dose date: [id number] Reported dose time: 0 Reported dose: 1,250 UNITS  
GLUCOSE, POC Collection Time: 10/10/18  3:42 PM  
Result Value Ref Range Glucose (POC) 150 (H) 70 - 110 mg/dL GLUCOSE, POC Collection Time: 10/10/18  9:47 PM  
Result Value Ref Range Glucose (POC) 138 (H) 70 - 110 mg/dL GLUCOSE, POC Collection Time: 10/11/18  2:52 AM  
Result Value Ref Range Glucose (POC) 85 70 - 110 mg/dL CBC WITH AUTOMATED DIFF  
 Collection Time: 10/11/18  4:30 AM  
Result Value Ref Range WBC 7.7 4.6 - 13.2 K/uL  
 RBC 3.87 (L) 4.70 - 5.50 M/uL  
 HGB 11.1 (L) 13.0 - 16.0 g/dL HCT 33.4 (L) 36.0 - 48.0 % MCV 86.3 74.0 - 97.0 FL  
 MCH 28.7 24.0 - 34.0 PG  
 MCHC 33.2 31.0 - 37.0 g/dL  
 RDW 13.6 11.6 - 14.5 % PLATELET 747 662 - 917 K/uL MPV 9.6 9.2 - 11.8 FL  
 NEUTROPHILS 84 (H) 40 - 73 % LYMPHOCYTES 13 (L) 21 - 52 % MONOCYTES 2 (L) 3 - 10 % EOSINOPHILS 1 0 - 5 % BASOPHILS 0 0 - 2 %  
 ABS. NEUTROPHILS 6.5 1.8 - 8.0 K/UL  
 ABS. LYMPHOCYTES 1.0 0.9 - 3.6 K/UL  
 ABS. MONOCYTES 0.1 0.05 - 1.2 K/UL  
 ABS. EOSINOPHILS 0.1 0.0 - 0.4 K/UL  
 ABS. BASOPHILS 0.0 0.0 - 0.1 K/UL  
 DF AUTOMATED METABOLIC PANEL, BASIC Collection Time: 10/11/18  4:30 AM  
Result Value Ref Range Sodium 140 136 - 145 mmol/L Potassium 3.6 3.5 - 5.5 mmol/L Chloride 106 100 - 108 mmol/L  
 CO2 23 21 - 32 mmol/L Anion gap 11 3.0 - 18 mmol/L Glucose 106 (H) 74 - 99 mg/dL BUN 5 (L) 7.0 - 18 MG/DL Creatinine 0.61 0.6 - 1.3 MG/DL  
 BUN/Creatinine ratio 8 (L) 12 - 20 GFR est AA >60 >60 ml/min/1.73m2 GFR est non-AA >60 >60 ml/min/1.73m2 Calcium 8.4 (L) 8.5 - 10.1 MG/DL  
SED RATE (ESR) Collection Time: 10/11/18  4:30 AM  
Result Value Ref Range Sed rate, automated 77 (H) 0 - 15 mm/hr GLUCOSE, POC Collection Time: 10/11/18  7:25 AM  
Result Value Ref Range Glucose (POC) 113 (H) 70 - 110 mg/dL Lab Results Component Value Date/Time Glucose 106 (H) 10/11/2018 04:30 AM  
 Glucose 96 10/09/2018 05:15 AM  
 Glucose 106 (H) 10/08/2018 07:37 AM  
 Glucose 84 10/07/2018 06:16 AM  
 Glucose 216 (H) 10/06/2018 04:34 AM  
  
 
Assessment/Plan:  
 
Patient Active Problem List  
Diagnosis Code  Sarcoidosis D86.9  DKA, type 1 ([address] E10.10  Sepsis ([address] A41.9  Diabetic foot ([address] E11.8  Diabetic foot ulcer ([address] E11.621, L97.509  Septic arthritis of IP joint of toe, right ([address] M00.9 A/P: 
Diabetic R foot with infection - Podiatry consulted - appreciate assistance - ID following- continue Ancef - OR cultures grew out rare staph aureus Sepsis 2/2 above - Resolved H/o A fib 
- Hold AC for now- patient to return to OR? H/o Sarcoidosis - prednisone 10 mg daily HTN  
- Lopressor increased to 75 mg BID  
- Hydralazine PRN  
 
T2DM  
- HgbA1C 9.6  
- SSI- resistant scale - Lantus 27 units qHS  
- Diabetic diet Chronic Pain - IV Morphine prn DVT Prophylaxis  
- Heparin Isha Light, RYANP-[address] [facility] Pager:  [phone] Office:  [phone]

## 2018-10-11 NOTE — INTERDISCIPLINARY ROUNDS
IDR Summary Patient: Nikolai Thompson MRN: [id number]    Age: 41 y.o.  : 1970 Admit Diagnosis: Sepsis ([address] Diabetic foot ([address] Sepsis ([address] second toe Diabetic foot ulcer ([address] Sepsis ([address] Septic arthritis of IP joint of toe, right ([address] 
necrotic foot Waiting on [facility]-authorizat Signed:  
 
ROSAS Rosado [address] [place], [place] [address] Hospitalist Division Pager:  [phone] Office:  [phone] \

## 2018-10-11 NOTE — PROGRESS NOTES
Patient is unable to communicate at this time.  offered prayer and left Spiritual Care brochure. Chaplains will continue to follow and will provide pastoral care on an as needed/requested basis. [facility] Staff  Spiritual Care  
[phone]

## 2018-10-11 NOTE — PROGRESS NOTES
Problem: Falls - Risk of 
Goal: *Absence of Falls Document Benson Levels Fall Risk and appropriate interventions in the flowsheet. Outcome: Progressing Towards Goal 
Fall Risk Interventions: 
Mobility Interventions: Bed/chair exit alarm, Patient to call before getting OOB Medication Interventions: Patient to call before getting OOB, Teach patient to arise slowly Elimination Interventions: Call light in reach Problem: Pressure Injury - Risk of 
Goal: *Prevention of pressure injury Document Rajesh Scale and appropriate interventions in the flowsheet. Outcome: Progressing Towards Goal 
Pressure Injury Interventions: 
Sensory Interventions: Assess changes in LOC Moisture Interventions: Absorbent underpads Activity Interventions: Increase time out of bed, Pressure redistribution bed/mattress(bed type) Mobility Interventions: HOB 30 degrees or less, Pressure redistribution bed/mattress (bed type) Nutrition Interventions: Document food/fluid/supplement intake, Offer support with meals,snacks and hydration

## 2018-10-11 NOTE — PROGRESS NOTES
2100- due meds given. Pt c/o pain rated 8/10. Prn pain med administered. Will continue to monitor. 2223- lantus 27 units given. humalog held per protocol for a BS of 138 
 
2347- due med given 
 
0220- pt complaining that he is very hot and very weak. Pt slurring words and skin temp feels cool/clammy. Cool cloth placed on pt's forehead and blood sugar checked. BS- 54. 8 oz of apple given to pt. Will recheck blood sugar. 5886-  Blood sugar rechecked- 85. Pt verbalizes that he is feeling better. 2531- vanco abx hung. Incontinence care. Pt cleaned up, gown, linens, pad, and pull up changed. No other needs at this time. Will continue to monitor. 2482- incontinence care provided. Pt cleans, linens and pad changed. Pt stated he felt like he still had more to do. This RN assisted pt to the restroom. Gait semi-steady right now. Pt told to pull bathroom cord when finished. 8240- pt assisted back to bed. due med and abx administered. Dressing changed on right foot per protocol. Pt tolerated well. Bedside shift change report given to JUAN A Malagon (oncoming nurse) by Jesse Koenig RN (offgoing nurse). Report included the following information SBAR, Kardex, Intake/Output, MAR, Accordion and Recent Results.

## 2018-10-11 NOTE — MANAGEMENT PLAN
Discharge/Transition Planning 
 
1100: Spoke with pt and wife. They would like SNF rehab at discharge. Pt would like to use the VA benefits for SNF. Wife states she talked to [place] physician and notified. Explained process. Explained VA often places at [address]. Pt/wife are agreeable to this. VA transfer form signed by pt. Pt for further debridement today; pt needs long term IV antibiotics and PICC line, pt will need therapy. Received verbal order from LAQUITA Ibarra for therapy to start tomorrow. Explained to pt /family. 1500: Transfer form, H&P, updated notes, VA card, face sheet faxed to [place] transfer center. Called Asif Kwong and Grant Lama line and no answer and no voicemail. Will try again Maria Elena Grace RN BSN Outcomes Manager Pager # [phone]

## 2018-10-11 NOTE — ROUTINE PROCESS
Right PICC inserted utilizing 3CG for SVC tip verification. Released for use, RN Camas [facility]. 1ml 1% lidocaine injected SC at insertion site for local anesthetic.

## 2018-10-12 LAB
ANION GAP SERPL CALC-SCNC: 10 MMOL/L (ref 3–18)
BASOPHILS # BLD: 0 K/UL (ref 0–0.1)
BASOPHILS NFR BLD: 0 % (ref 0–2)
BUN SERPL-MCNC: 6 MG/DL (ref 7–18)
BUN/CREAT SERPL: 10 (ref 12–20)
CALCIUM SERPL-MCNC: 8.1 MG/DL (ref 8.5–10.1)
CHLORIDE SERPL-SCNC: 104 MMOL/L (ref 100–108)
CO2 SERPL-SCNC: 25 MMOL/L (ref 21–32)
CREAT SERPL-MCNC: 0.63 MG/DL (ref 0.6–1.3)
DATE LAST DOSE: ABNORMAL
DIFFERENTIAL METHOD BLD: ABNORMAL
EOSINOPHIL # BLD: 0.1 K/UL (ref 0–0.4)
EOSINOPHIL NFR BLD: 1 % (ref 0–5)
ERYTHROCYTE [DISTWIDTH] IN BLOOD BY AUTOMATED COUNT: 13.4 % (ref 11.6–14.5)
GLUCOSE BLD STRIP.AUTO-MCNC: 109 MG/DL (ref 70–110)
GLUCOSE BLD STRIP.AUTO-MCNC: 118 MG/DL (ref 70–110)
GLUCOSE BLD STRIP.AUTO-MCNC: 151 MG/DL (ref 70–110)
GLUCOSE BLD STRIP.AUTO-MCNC: 391 MG/DL (ref 70–110)
GLUCOSE BLD STRIP.AUTO-MCNC: 518 MG/DL (ref 70–110)
GLUCOSE BLD STRIP.AUTO-MCNC: 56 MG/DL (ref 70–110)
GLUCOSE BLD STRIP.AUTO-MCNC: 91 MG/DL (ref 70–110)
GLUCOSE SERPL-MCNC: 99 MG/DL (ref 74–99)
HCT VFR BLD AUTO: 32.5 % (ref 36–48)
HGB BLD-MCNC: 10.8 G/DL (ref 13–16)
LYMPHOCYTES # BLD: 0.6 K/UL (ref 0.9–3.6)
LYMPHOCYTES NFR BLD: 6 % (ref 21–52)
MAGNESIUM SERPL-MCNC: 1.2 MG/DL (ref 1.6–2.6)
MCH RBC QN AUTO: 28.6 PG (ref 24–34)
MCHC RBC AUTO-ENTMCNC: 33.2 G/DL (ref 31–37)
MCV RBC AUTO: 86.2 FL (ref 74–97)
MONOCYTES # BLD: 0.9 K/UL (ref 0.05–1.2)
MONOCYTES NFR BLD: 8 % (ref 3–10)
NEUTS SEG # BLD: 8.8 K/UL (ref 1.8–8)
NEUTS SEG NFR BLD: 85 % (ref 40–73)
PLATELET # BLD AUTO: 225 K/UL (ref 135–420)
PMV BLD AUTO: 9.8 FL (ref 9.2–11.8)
POTASSIUM SERPL-SCNC: 3.2 MMOL/L (ref 3.5–5.5)
RBC # BLD AUTO: 3.77 M/UL (ref 4.7–5.5)
REPORTED DOSE,DOSE: ABNORMAL UNITS
REPORTED DOSE/TIME,TMG: 300
SODIUM SERPL-SCNC: 139 MMOL/L (ref 136–145)
VANCOMYCIN TROUGH SERPL-MCNC: 9.1 UG/ML (ref 10–20)
WBC # BLD AUTO: 10.4 K/UL (ref 4.6–13.2)

## 2018-10-12 PROCEDURE — 74011250636 HC RX REV CODE- 250/636: Performed by: HOSPITALIST

## 2018-10-12 PROCEDURE — 76010000149 HC OR TIME 1 TO 1.5 HR: Performed by: PODIATRIST

## 2018-10-12 PROCEDURE — 0JQQ0ZZ REPAIR RIGHT FOOT SUBCUTANEOUS TISSUE AND FASCIA, OPEN APPROACH: ICD-10-PCS | Performed by: PODIATRIST

## 2018-10-12 PROCEDURE — 76210000063 HC OR PH I REC FIRST 0.5 HR: Performed by: PODIATRIST

## 2018-10-12 PROCEDURE — 82962 GLUCOSE BLOOD TEST: CPT

## 2018-10-12 PROCEDURE — 74011250637 HC RX REV CODE- 250/637: Performed by: HOSPITALIST

## 2018-10-12 PROCEDURE — 74011250637 HC RX REV CODE- 250/637: Performed by: NURSE PRACTITIONER

## 2018-10-12 PROCEDURE — 77030002986 HC SUT PROL J&J -A: Performed by: PODIATRIST

## 2018-10-12 PROCEDURE — 74011000258 HC RX REV CODE- 258

## 2018-10-12 PROCEDURE — 77030013708 HC HNDPC SUC IRR PULS STRY –B: Performed by: PODIATRIST

## 2018-10-12 PROCEDURE — 65270000029 HC RM PRIVATE

## 2018-10-12 PROCEDURE — 76060000033 HC ANESTHESIA 1 TO 1.5 HR: Performed by: PODIATRIST

## 2018-10-12 PROCEDURE — 85025 COMPLETE CBC W/AUTO DIFF WBC: CPT | Performed by: NURSE PRACTITIONER

## 2018-10-12 PROCEDURE — 83036 HEMOGLOBIN GLYCOSYLATED A1C: CPT | Performed by: HOSPITALIST

## 2018-10-12 PROCEDURE — 74011000250 HC RX REV CODE- 250: Performed by: PODIATRIST

## 2018-10-12 PROCEDURE — 80202 ASSAY OF VANCOMYCIN: CPT | Performed by: HOSPITALIST

## 2018-10-12 PROCEDURE — 74011250637 HC RX REV CODE- 250/637: Performed by: INTERNAL MEDICINE

## 2018-10-12 PROCEDURE — 77030027714 HC DRN WND KT TLS STRY -B: Performed by: PODIATRIST

## 2018-10-12 PROCEDURE — 83735 ASSAY OF MAGNESIUM: CPT | Performed by: NURSE PRACTITIONER

## 2018-10-12 PROCEDURE — 80048 BASIC METABOLIC PNL TOTAL CA: CPT | Performed by: HOSPITALIST

## 2018-10-12 PROCEDURE — 77030031139 HC SUT VCRL2 J&J -A: Performed by: PODIATRIST

## 2018-10-12 PROCEDURE — 74011636637 HC RX REV CODE- 636/637: Performed by: INTERNAL MEDICINE

## 2018-10-12 PROCEDURE — 84100 ASSAY OF PHOSPHORUS: CPT | Performed by: HOSPITALIST

## 2018-10-12 PROCEDURE — 74011000272 HC RX REV CODE- 272: Performed by: PODIATRIST

## 2018-10-12 PROCEDURE — 80048 BASIC METABOLIC PNL TOTAL CA: CPT | Performed by: NURSE PRACTITIONER

## 2018-10-12 PROCEDURE — 74011250636 HC RX REV CODE- 250/636

## 2018-10-12 PROCEDURE — 77030032490 HC SLV COMPR SCD KNE COVD -B: Performed by: PODIATRIST

## 2018-10-12 PROCEDURE — 74011636637 HC RX REV CODE- 636/637: Performed by: HOSPITALIST

## 2018-10-12 PROCEDURE — 74011000258 HC RX REV CODE- 258: Performed by: HOSPITALIST

## 2018-10-12 PROCEDURE — 74011250636 HC RX REV CODE- 250/636: Performed by: PODIATRIST

## 2018-10-12 PROCEDURE — 77030018836 HC SOL IRR NACL ICUM -A: Performed by: PODIATRIST

## 2018-10-12 PROCEDURE — 74011250636 HC RX REV CODE- 250/636: Performed by: INTERNAL MEDICINE

## 2018-10-12 PROCEDURE — 83735 ASSAY OF MAGNESIUM: CPT | Performed by: HOSPITALIST

## 2018-10-12 RX ORDER — INSULIN GLARGINE 100 [IU]/ML
25 INJECTION, SOLUTION SUBCUTANEOUS
Status: DISCONTINUED | OUTPATIENT
Start: 2018-10-12 | End: 2018-10-12

## 2018-10-12 RX ORDER — MIDAZOLAM HYDROCHLORIDE 1 MG/ML
INJECTION, SOLUTION INTRAMUSCULAR; INTRAVENOUS AS NEEDED
Status: DISCONTINUED | OUTPATIENT
Start: 2018-10-12 | End: 2018-10-12 | Stop reason: HOSPADM

## 2018-10-12 RX ORDER — INSULIN LISPRO 100 [IU]/ML
4 INJECTION, SOLUTION INTRAVENOUS; SUBCUTANEOUS
Status: DISCONTINUED | OUTPATIENT
Start: 2018-10-12 | End: 2018-10-12

## 2018-10-12 RX ORDER — POTASSIUM CHLORIDE 20 MEQ/1
40 TABLET, EXTENDED RELEASE ORAL
Status: COMPLETED | OUTPATIENT
Start: 2018-10-12 | End: 2018-10-12

## 2018-10-12 RX ORDER — MAGNESIUM SULFATE 100 %
4 CRYSTALS MISCELLANEOUS AS NEEDED
Status: DISCONTINUED | OUTPATIENT
Start: 2018-10-12 | End: 2018-10-13

## 2018-10-12 RX ORDER — HYDROMORPHONE HYDROCHLORIDE 2 MG/ML
0.5 INJECTION, SOLUTION INTRAMUSCULAR; INTRAVENOUS; SUBCUTANEOUS
Status: DISCONTINUED | OUTPATIENT
Start: 2018-10-12 | End: 2018-10-12 | Stop reason: HOSPADM

## 2018-10-12 RX ORDER — INSULIN LISPRO 100 [IU]/ML
INJECTION, SOLUTION INTRAVENOUS; SUBCUTANEOUS ONCE
Status: DISCONTINUED | OUTPATIENT
Start: 2018-10-12 | End: 2018-10-12 | Stop reason: HOSPADM

## 2018-10-12 RX ORDER — INSULIN LISPRO 100 [IU]/ML
2 INJECTION, SOLUTION INTRAVENOUS; SUBCUTANEOUS
Status: DISCONTINUED | OUTPATIENT
Start: 2018-10-12 | End: 2018-10-12

## 2018-10-12 RX ORDER — CEFAZOLIN SODIUM 1 G/3ML
INJECTION, POWDER, FOR SOLUTION INTRAMUSCULAR; INTRAVENOUS AS NEEDED
Status: DISCONTINUED | OUTPATIENT
Start: 2018-10-12 | End: 2018-10-12 | Stop reason: HOSPADM

## 2018-10-12 RX ORDER — DIPHENHYDRAMINE HYDROCHLORIDE 50 MG/ML
12.5 INJECTION, SOLUTION INTRAMUSCULAR; INTRAVENOUS
Status: DISCONTINUED | OUTPATIENT
Start: 2018-10-12 | End: 2018-10-12 | Stop reason: HOSPADM

## 2018-10-12 RX ORDER — SODIUM CHLORIDE, SODIUM LACTATE, POTASSIUM CHLORIDE, CALCIUM CHLORIDE 600; 310; 30; 20 MG/100ML; MG/100ML; MG/100ML; MG/100ML
100 INJECTION, SOLUTION INTRAVENOUS CONTINUOUS
Status: DISCONTINUED | OUTPATIENT
Start: 2018-10-12 | End: 2018-10-12 | Stop reason: HOSPADM

## 2018-10-12 RX ORDER — PROPOFOL 10 MG/ML
INJECTION, EMULSION INTRAVENOUS
Status: DISCONTINUED | OUTPATIENT
Start: 2018-10-12 | End: 2018-10-12 | Stop reason: HOSPADM

## 2018-10-12 RX ORDER — HYDROMORPHONE HYDROCHLORIDE 2 MG/ML
0.2 INJECTION, SOLUTION INTRAMUSCULAR; INTRAVENOUS; SUBCUTANEOUS AS NEEDED
Status: DISCONTINUED | OUTPATIENT
Start: 2018-10-12 | End: 2018-10-12 | Stop reason: HOSPADM

## 2018-10-12 RX ORDER — MAGNESIUM SULFATE HEPTAHYDRATE 40 MG/ML
2 INJECTION, SOLUTION INTRAVENOUS ONCE
Status: COMPLETED | OUTPATIENT
Start: 2018-10-12 | End: 2018-10-12

## 2018-10-12 RX ORDER — MAGNESIUM SULFATE 100 %
4 CRYSTALS MISCELLANEOUS AS NEEDED
Status: DISCONTINUED | OUTPATIENT
Start: 2018-10-12 | End: 2018-10-12 | Stop reason: HOSPADM

## 2018-10-12 RX ORDER — SODIUM CHLORIDE 0.9 % (FLUSH) 0.9 %
5-10 SYRINGE (ML) INJECTION AS NEEDED
Status: DISCONTINUED | OUTPATIENT
Start: 2018-10-12 | End: 2018-10-12 | Stop reason: HOSPADM

## 2018-10-12 RX ORDER — SODIUM CHLORIDE 9 MG/ML
INJECTION, SOLUTION INTRAVENOUS
Status: DISCONTINUED | OUTPATIENT
Start: 2018-10-12 | End: 2018-10-12 | Stop reason: HOSPADM

## 2018-10-12 RX ORDER — DEXTROSE MONOHYDRATE 25 G/50ML
25-50 INJECTION, SOLUTION INTRAVENOUS AS NEEDED
Status: DISCONTINUED | OUTPATIENT
Start: 2018-10-12 | End: 2018-10-12 | Stop reason: HOSPADM

## 2018-10-12 RX ORDER — OXYCODONE AND ACETAMINOPHEN 5; 325 MG/1; MG/1
1 TABLET ORAL AS NEEDED
Status: DISCONTINUED | OUTPATIENT
Start: 2018-10-12 | End: 2018-10-12 | Stop reason: HOSPADM

## 2018-10-12 RX ORDER — DEXTROSE MONOHYDRATE 25 G/50ML
25-50 INJECTION, SOLUTION INTRAVENOUS AS NEEDED
Status: DISCONTINUED | OUTPATIENT
Start: 2018-10-12 | End: 2018-10-13

## 2018-10-12 RX ADMIN — CEFAZOLIN SODIUM 2 G: 2 SOLUTION INTRAVENOUS at 05:59

## 2018-10-12 RX ADMIN — DEXTROSE MONOHYDRATE 12.5 G: 25 INJECTION, SOLUTION INTRAVENOUS at 02:07

## 2018-10-12 RX ADMIN — INSULIN LISPRO 2 UNITS: 100 INJECTION, SOLUTION INTRAVENOUS; SUBCUTANEOUS at 17:28

## 2018-10-12 RX ADMIN — SODIUM CHLORIDE: 9 INJECTION, SOLUTION INTRAVENOUS at 15:12

## 2018-10-12 RX ADMIN — ONDANSETRON 4 MG: 2 INJECTION INTRAMUSCULAR; INTRAVENOUS at 01:40

## 2018-10-12 RX ADMIN — POTASSIUM CHLORIDE 40 MEQ: 20 TABLET, EXTENDED RELEASE ORAL at 10:18

## 2018-10-12 RX ADMIN — Medication 10 ML: at 22:37

## 2018-10-12 RX ADMIN — INSULIN LISPRO 3 UNITS: 100 INJECTION, SOLUTION INTRAVENOUS; SUBCUTANEOUS at 17:28

## 2018-10-12 RX ADMIN — Medication 20 ML: at 17:29

## 2018-10-12 RX ADMIN — PREDNISONE 10 MG: 5 TABLET ORAL at 09:20

## 2018-10-12 RX ADMIN — CEFAZOLIN SODIUM 2 G: 1 INJECTION, POWDER, FOR SOLUTION INTRAMUSCULAR; INTRAVENOUS at 15:29

## 2018-10-12 RX ADMIN — ACETAMINOPHEN 650 MG: 325 TABLET, FILM COATED ORAL at 01:40

## 2018-10-12 RX ADMIN — MAGNESIUM SULFATE HEPTAHYDRATE 2 G: 40 INJECTION, SOLUTION INTRAVENOUS at 13:42

## 2018-10-12 RX ADMIN — CEFAZOLIN SODIUM 2 G: 2 SOLUTION INTRAVENOUS at 17:27

## 2018-10-12 RX ADMIN — PANTOPRAZOLE SODIUM 40 MG: 40 TABLET, DELAYED RELEASE ORAL at 22:35

## 2018-10-12 RX ADMIN — METOPROLOL TARTRATE 75 MG: 50 TABLET ORAL at 22:35

## 2018-10-12 RX ADMIN — INSULIN GLARGINE 25 UNITS: 100 INJECTION, SOLUTION SUBCUTANEOUS at 22:36

## 2018-10-12 RX ADMIN — CEFAZOLIN SODIUM 2 G: 2 SOLUTION INTRAVENOUS at 22:37

## 2018-10-12 RX ADMIN — VENLAFAXINE HYDROCHLORIDE 75 MG: 37.5 TABLET ORAL at 22:35

## 2018-10-12 RX ADMIN — HEPARIN SODIUM 5000 UNITS: 5000 INJECTION INTRAVENOUS; SUBCUTANEOUS at 22:36

## 2018-10-12 RX ADMIN — MIDAZOLAM HYDROCHLORIDE 2 MG: 1 INJECTION, SOLUTION INTRAMUSCULAR; INTRAVENOUS at 15:12

## 2018-10-12 RX ADMIN — Medication 10 ML: at 17:29

## 2018-10-12 RX ADMIN — PROPOFOL 50 MCG/KG/MIN: 10 INJECTION, EMULSION INTRAVENOUS at 15:18

## 2018-10-12 RX ADMIN — Medication 10 ML: at 13:45

## 2018-10-12 RX ADMIN — INSULIN LISPRO 15 UNITS: 100 INJECTION, SOLUTION INTRAVENOUS; SUBCUTANEOUS at 22:36

## 2018-10-12 RX ADMIN — Medication 10 ML: at 06:01

## 2018-10-12 RX ADMIN — Medication 10 ML: at 06:00

## 2018-10-12 RX ADMIN — METOPROLOL TARTRATE 75 MG: 50 TABLET ORAL at 09:20

## 2018-10-12 NOTE — PROGRESS NOTES
2000-no signs of distress. Ordered meds given throughout shift. Patient had episode of low blood sugar. Patient found to be sweating profusely. Vitals observed as baseline. Glucose checked and found to be 56. D50 given. Rechecked glucose. glucose andria to 109. Changed linens and patient fell back to sleep. 0530- Wiped patient with CHG wipes for preop. Bedside shift change report given to [address] [place] [address] (oncoming nurse) by Olive Cordova (offgoing nurse). Report included the following information SBAR.

## 2018-10-12 NOTE — ANESTHESIA POSTPROCEDURE EVALUATION
Post-Anesthesia Evaluation and Assessment Patient: Jade Alcala MRN: [id number]  SSN: [id number] YOB: 1970  Age: 41 y.o. Sex: male Data from PACU flowsheet Cardiovascular Function/Vital Signs Visit Vitals  BP 97/70  Pulse 72  Temp 36.2 °C (97.2 °F)  Resp 18  Ht 6' 2\" (1.88 m)  Wt 85.3 kg (188 lb)  SpO2 100%  BMI 24.14 kg/m2 Patient is status post anesthesia Nausea/Vomiting: controlled Postoperative hydration reviewed and adequate. Pain: 
Managed Neurological Status: At baseline Mental Status and Level of Consciousness: Alert and oriented Pulmonary Status:  
Adequate oxygenation and airway patent Complications related to anesthesia: None Post-anesthesia assessment completed. No concerns Signed By: Clair Rios CRNA October 12, 2018

## 2018-10-12 NOTE — BRIEF OP NOTE
BRIEF OPERATIVE NOTE Date of Procedure: 10/12/2018 Preoperative Diagnosis: open amputation site right foot Postoperative Diagnosis: same Procedure(s): EXTREMITY IRRIGATION AND DEBRIDEMENT AND DELAYED CLOSURE OF right FOOT Surgeon(s) and Role: * JENNIFER Camacho DPM - Primary Surgical Assistant: Lizbeth Barrientos Surgical Staff: 
Circ-1: Dnaial Benitez RN 
Circ-Relief: Rosamaria Parks RN Scrub Tech-1: Alicia Pugh Surg Asst-1: Paty Andrews Event Time In Incision Start 1526 Incision Close Anesthesia: General  
Estimated Blood Loss: 15 ml Specimens: * No specimens in log * Findings: no purulence, normal 2nd metatarsal head Complications: none Implants: * No implants in log *

## 2018-10-12 NOTE — ROUTINE PROCESS
0830-Assessment completed, call bell within reach, no distress noted, redressed right foot wound. No distress noted. 0911-am due medications given, right CVP line removed, tolerated well. 1230-no change in condition, no distress noted. 1400-resting quietly in bed. 1500-OK to eat per Dr. Al Solomon, surgery postponed for Friday. 1630-no change in condition, no distress noted. 1800-pm due medications given. 1925-Bedside and Verbal shift change report given to Arthur Vera (oncoming nurse) by Yvonne Arevalo (offgoing nurse). Report included the following information SBAR, MAR and Recent Results.

## 2018-10-12 NOTE — PROGRESS NOTES
4429: PT orders received and chart reviewed. Per chart review patient to OR today for wound debridement and tertiary wound closure. Will hold PT evaluation and f/u with patient on 10/13. Please update any weight bearing restrictions if necessary. Thank you for this referral, Theodore Chowdary PT, DPT Office extension: [phone] Pager #: [phone]

## 2018-10-12 NOTE — PROGRESS NOTES
[facility] Daily Progress Note Daily progress Note Patient: Nikolai Thompson MRN: [id number]  CSN: [id number] YOB: 1970  Age: 41 y.o. Sex: male DOA: 10/5/2018 LOS:  LOS: 7 days Chief Complaint:  Sepsis Interval History: 40 y/o [place] American male with hx of DM, HTN, sarcoidosis, afib, HLD, GERD, daily tobacco abuse presented to the ED on 10/5 with sepsis. Pt states he had an infection in his right foot ~one month. He was seen at [facility] and they wanted to admit him for further work up and IV Abx, however pt refused admission. Since then, pt reports the area has slowly gotten worse with increased swelling and drainage. Xray right foot showed deossification along both sides of DIP joint second toe, could represent early osteomyelitis. A1C 9.6% this admission. Pt admitted for further management of care, PCCM consulted, podiatry consulted, pt started on IV Abx. He is s/p right second toe amputation by Dr. Kenia Norman on 10/6 with clear margins. Surgical path with acute and chronic osteomyelitis. ID consulted on 10/9. Cultures with MSSA and FBS. PICC placed on 10/11. ID recommends Ancef 2 grams IV TID through 11/18 with weekly labs (CBC w/ diff, BMP and ESR). Per podiatry, if continues to improve will consider wound VAC placement. Assessment/Plan:  
 
Patient Active Problem List  
Diagnosis Code  Sarcoidosis D86.9  DKA, type 1 ([address] E10.10  Sepsis ([address] A41.9  Diabetic foot ([address] E11.8  Diabetic foot ulcer ([address] E11.621, L97.509  Septic arthritis of IP joint of toe, right ([address] M00.9 A/P: 
Sepsis 
- secondary to diabetic foot ulcer 
- lactic acid now normal 
- ID following, recommend Ancef TID through 11/8 
- surgical culture with few streptococci beta hemolytic group B, rare staph aureus 
- blood cultures collected 10/5 NGTD x 2 
 
 Osteomyelitis of right 2nd digit - podiatry following - s/p amputation right 2nd digit on 10/6 
- path with acute and chronic OM 
- surgical culture with few streptococci beta hemolytic Group B, rare staph aureus 
- blood cultures NGTD x 2 
- ID recommends Ancef TID through 11/8 
- for irrigation, debridement and delayed closure today with Dr. Tracee Gutiérrez Afib 
- tele monitoring 
- was on  mg PTA- currently on hold Diabetes - A1C 9.6% this admission 
- monitor accuchecks, correctional SSI, Humalog 3 units TID, Lantus 27 units nightly Hx of sarcoidosis - Prednisone 10 mg daily Hypertension - Lopressor 75 mg BID 
 
DVT Prophylaxis - SCD's BLE Disposition - plan to d/c to [facility] [address], RYANP-C [facility] Pager:  [phone] Office:  [phone] Subjective: For OR today. No c/o pain. No acute events overnight. Objective:  
  
Visit Vitals  BP (!) 148/91 (BP 1 Location: Left arm, BP Patient Position: Supine)  Pulse 70  Temp 97.6 °F (36.4 °C)  Resp 18  Ht 6' 2\" (1.88 m)  Wt 85.3 kg (188 lb)  SpO2 100%  BMI 24.14 kg/m2 Physical Exam: 
General appearance: alert, cooperative, no distress, appears stated age Head: Normocephalic, without obvious abnormality, atraumatic Lungs: clear to auscultation bilaterally Heart: regular rate and rhythm, S1, S2 normal, no murmur, click, rub or gallop Abdomen: soft, non tender, non distended. Normoactive bowel sounds. Extremities: dressing right foot intact Skin: Skin color, texture, turgor normal. No rashes or lesions Neurologic: Grossly normal 
PSY: Mood and affect normal, appropriately behaved Intake and Output: 
Current Shift:    
Last three shifts:  10/10 1901 - 10/12 0700 In: 6356 [P.O.:1200; I.V.:450] Out: 1900 [[id number]] Recent Results (from the past 24 hour(s)) GLUCOSE, POC  
 Collection Time: 10/11/18 11:45 AM  
Result Value Ref Range Glucose (POC) 140 (H) 70 - 110 mg/dL GLUCOSE, POC Collection Time: 10/11/18  4:06 PM  
Result Value Ref Range Glucose (POC) 200 (H) 70 - 110 mg/dL GLUCOSE, POC Collection Time: 10/11/18  9:24 PM  
Result Value Ref Range Glucose (POC) 310 (H) 70 - 110 mg/dL GLUCOSE, POC Collection Time: 10/12/18  2:03 AM  
Result Value Ref Range Glucose (POC) 56 (L) 70 - 110 mg/dL GLUCOSE, POC Collection Time: 10/12/18  2:28 AM  
Result Value Ref Range Glucose (POC) 109 70 - 110 mg/dL CBC WITH AUTOMATED DIFF Collection Time: 10/12/18  4:40 AM  
Result Value Ref Range WBC 10.4 4.6 - 13.2 K/uL  
 RBC 3.77 (L) 4.70 - 5.50 M/uL  
 HGB 10.8 (L) 13.0 - 16.0 g/dL HCT 32.5 (L) 36.0 - 48.0 % MCV 86.2 74.0 - 97.0 FL  
 MCH 28.6 24.0 - 34.0 PG  
 MCHC 33.2 31.0 - 37.0 g/dL  
 RDW 13.4 11.6 - 14.5 % PLATELET 150 118 - 555 K/uL MPV 9.8 9.2 - 11.8 FL  
 NEUTROPHILS 85 (H) 40 - 73 % LYMPHOCYTES 6 (L) 21 - 52 % MONOCYTES 8 3 - 10 % EOSINOPHILS 1 0 - 5 % BASOPHILS 0 0 - 2 %  
 ABS. NEUTROPHILS 8.8 (H) 1.8 - 8.0 K/UL  
 ABS. LYMPHOCYTES 0.6 (L) 0.9 - 3.6 K/UL  
 ABS. MONOCYTES 0.9 0.05 - 1.2 K/UL  
 ABS. EOSINOPHILS 0.1 0.0 - 0.4 K/UL  
 ABS. BASOPHILS 0.0 0.0 - 0.1 K/UL  
 DF AUTOMATED METABOLIC PANEL, BASIC Collection Time: 10/12/18  4:40 AM  
Result Value Ref Range Sodium 139 136 - 145 mmol/L Potassium 3.2 (L) 3.5 - 5.5 mmol/L Chloride 104 100 - 108 mmol/L  
 CO2 25 21 - 32 mmol/L Anion gap 10 3.0 - 18 mmol/L Glucose 99 74 - 99 mg/dL BUN 6 (L) 7.0 - 18 MG/DL Creatinine 0.63 0.6 - 1.3 MG/DL  
 BUN/Creatinine ratio 10 (L) 12 - 20 GFR est AA >60 >60 ml/min/1.73m2 GFR est non-AA >60 >60 ml/min/1.73m2 Calcium 8.1 (L) 8.5 - 10.1 MG/DL  
GLUCOSE, POC Collection Time: 10/12/18  7:19 AM  
Result Value Ref Range Glucose (POC) 91 70 - 110 mg/dL Lab Results Component Value Date/Time Glucose 99 10/12/2018 04:40 AM  
 Glucose 106 (H) 10/11/2018 04:30 AM  
 Glucose 96 10/09/2018 05:15 AM  
 Glucose 106 (H) 10/08/2018 07:37 AM  
 Glucose 84 10/07/2018 06:16 AM  
  
 
Imaging: No results found. Medication List Reviewed: 
Current Facility-Administered Medications Medication Dose Route Frequency  ceFAZolin (ANCEF) 2g IVPB in 50 mL D5W  2 g IntraVENous Q8H  
 metoprolol tartrate (LOPRESSOR) tablet 75 mg  75 mg Oral Q12H  
 bacitracin 500 unit/gram packet 1 Packet  1 Packet Topical PRN  
 sodium chloride (NS) flush 10-30 mL  10-30 mL InterCATHeter PRN  
 sodium chloride (NS) flush 10 mL  10 mL InterCATHeter Q24H  
 sodium chloride (NS) flush 10 mL  10 mL InterCATHeter PRN  
 sodium chloride (NS) flush 10-40 mL  10-40 mL InterCATHeter Q8H  
 sodium chloride (NS) flush 20 mL  20 mL InterCATHeter Q24H  
 loperamide (IMODIUM) capsule 4 mg  4 mg Oral Q6H PRN  
 insulin lispro (HUMALOG) injection 3 Units  3 Units SubCUTAneous TIDAC  insulin glargine (LANTUS) injection 27 Units  27 Units SubCUTAneous QHS  insulin lispro (HUMALOG) injection   SubCUTAneous AC&HS  venlafaxine (EFFEXOR) tablet 75 mg  75 mg Oral QHS  pantoprazole (PROTONIX) tablet 40 mg  40 mg Oral QHS  acetaminophen (TYLENOL) tablet 650 mg  650 mg Oral Q6H PRN  
 ondansetron (ZOFRAN) injection 4 mg  4 mg IntraVENous Q6H PRN  
 heparin (porcine) injection 5,000 Units  5,000 Units SubCUTAneous Q8H  
 hydrALAZINE (APRESOLINE) 20 mg/mL injection 10 mg  10 mg IntraVENous Q6H PRN  predniSONE (DELTASONE) tablet 10 mg  10 mg Oral DAILY WITH BREAKFAST  morphine injection 2 mg  2 mg IntraVENous Q4H PRN  
 sodium chloride (NS) flush 5-10 mL  5-10 mL IntraVENous Q8H  
 sodium chloride (NS) flush 5-10 mL  5-10 mL IntraVENous PRN  
 oxyCODONE-acetaminophen (PERCOCET) 5-325 mg per tablet 2 Tab  2 Tab Oral Q4H PRN  
 glucose chewable tablet 16 g  16 g Oral PRN  
  glucagon (GLUCAGEN) injection 1 mg  1 mg IntraMUSCular PRN  
 dextrose (D50) infusion 12.5-25 g  25-50 mL IntraVENous PRN  
 sodium chloride (NS) flush 5-10 mL  5-10 mL IntraVENous PRN

## 2018-10-12 NOTE — MANAGEMENT PLAN
Discharge/Transition Planning 
 
0930: Called [facility] and spoke with Nely Trinh who handles the SNF rehab for [place] patient. She has pt in system and he is active and can use SNF benefit. She is in agreement with referral to [address]. Emily Smith RN BSN Outcomes Manager Pager # [phone]

## 2018-10-12 NOTE — PROGRESS NOTES
INFECTIOUS DISEASE PROGRESS NOTE ABX:  
 
Current abx Prior abx Ancef     10/10  2 Zosyn10/5-4 ;vanco 10/5-6 ASSESSMENT: -- RECOMMENDATIONS Sepsis POA- suspect sec to cellulitis and abscess/OM of second toe - clinical features resolved 
- afebrile now and with normal WBC 
- Rt DFU involving second toe- OM and ?abscess and cellulitis: MSSA, GBS 
- Xray reviewed and concerning for second DIP joint infection - ESR 39 and  - S/p amputation at MTP joint, Cx BHS, SA 
- Path s/p acute and chronic OM but was sent from toe itself and not clear margin so positive as expected- cannot ro residual involvement of infected margin - needs PICC- ordered for today 
 
-ancef 2g iv q 8h through 11/18  
- weekly lab- cbc/diff, bmp and esr 
-fu with ID 2 weeks post dc - wrote [facility] orders and discussed with pt 
-plans for transfer noted. Will see pt again as out pt in 2 weeks DM2- Uncontrolled- HbA1c of >9 - Needs better control of sugars for better wd healing and prevention of subsequent infections H/o Sarcoidosis- on prednisone - Immunocompromised host 
- at risk for infection and poor healing on steroids Erythema nodosum of legs 
- suspect sec to sarcoidosis - LWC 
- Control of dis HTN   
H/o A fib MICROBIOLOGY:  
 
10/5 Blcx x2 ntd final 
10/6 Tissue Cx  MSSA, GBS 
 
 
LINES/DRAINS:  
 
Rt neck Central line 10/5 Sepideh Copper Order for PICC  10/11 Past medical history:  
 
Past Medical History:  
Diagnosis Date  Diabetes [address]  Hypercholesteremia  Myocardial infarct ([facility]  Sarcoidosis Allergies:  
No Known Allergies Current Medications:  
 
Current Facility-Administered Medications Medication Dose Route Frequency  insulin glargine (LANTUS) injection 25 Units  25 Units SubCUTAneous QHS  insulin lispro (HUMALOG) injection 2 Units  2 Units SubCUTAneous TIDAC  
  magnesium sulfate 2 g/50 ml IVPB (premix or compounded)  2 g IntraVENous ONCE  
 ceFAZolin (ANCEF) 2g IVPB in 50 mL D5W  2 g IntraVENous Q8H  
 metoprolol tartrate (LOPRESSOR) tablet 75 mg  75 mg Oral Q12H  
 bacitracin 500 unit/gram packet 1 Packet  1 Packet Topical PRN  
 sodium chloride (NS) flush 10-30 mL  10-30 mL InterCATHeter PRN  
 sodium chloride (NS) flush 10 mL  10 mL InterCATHeter Q24H  
 sodium chloride (NS) flush 10 mL  10 mL InterCATHeter PRN  
 sodium chloride (NS) flush 10-40 mL  10-40 mL InterCATHeter Q8H  
 sodium chloride (NS) flush 20 mL  20 mL InterCATHeter Q24H  
 loperamide (IMODIUM) capsule 4 mg  4 mg Oral Q6H PRN  
 insulin lispro (HUMALOG) injection   SubCUTAneous AC&HS  venlafaxine (EFFEXOR) tablet 75 mg  75 mg Oral QHS  pantoprazole (PROTONIX) tablet 40 mg  40 mg Oral QHS  acetaminophen (TYLENOL) tablet 650 mg  650 mg Oral Q6H PRN  
 ondansetron (ZOFRAN) injection 4 mg  4 mg IntraVENous Q6H PRN  
 heparin (porcine) injection 5,000 Units  5,000 Units SubCUTAneous Q8H  
 hydrALAZINE (APRESOLINE) 20 mg/mL injection 10 mg  10 mg IntraVENous Q6H PRN  predniSONE (DELTASONE) tablet 10 mg  10 mg Oral DAILY WITH BREAKFAST  morphine injection 2 mg  2 mg IntraVENous Q4H PRN  
 sodium chloride (NS) flush 5-10 mL  5-10 mL IntraVENous Q8H  
 sodium chloride (NS) flush 5-10 mL  5-10 mL IntraVENous PRN  
 oxyCODONE-acetaminophen (PERCOCET) 5-325 mg per tablet 2 Tab  2 Tab Oral Q4H PRN  
 glucose chewable tablet 16 g  16 g Oral PRN  
 glucagon (GLUCAGEN) injection 1 mg  1 mg IntraMUSCular PRN  
 dextrose (D50) infusion 12.5-25 g  25-50 mL IntraVENous PRN  
 sodium chloride (NS) flush 5-10 mL  5-10 mL IntraVENous PRN Physical Exam: 
Vitals Temp (24hrs), Av °F (36.7 °C), Min:97.5 °F (36.4 °C), Max:98.4 °F (36.9 °C) Visit Vitals  /83 (BP 1 Location: Left arm, BP Patient Position: Supine)  Pulse 67  Temp 98.1 °F (36.7 °C)  Resp 18  Ht 6' 2\" (1.88 m)  Wt 85.3 kg (188 lb)  SpO2 100%  BMI 24.14 kg/m2 HEENT: no thrush Lungs clear Heart RRR Rt foot bandaged- D/I Labs: Results:  
Chemistry Recent Labs 10/12/18 
 0440  10/11/18 
 0430 GLU  99  106* NA  139  140  
K  3.2*  3.6 CL  104  106 CO2  25  23 BUN  6*  5*  
CREA  0.63  0.61  
CA  8.1*  8.4* AGAP  10  11 BUCR  10*  8* CBC w/Diff Recent Labs 10/12/18 
 0440  10/11/18 
 0430 WBC  10.4  7.7 RBC  3.77*  3.87* HGB  10.8*  11.1*  
HCT  32.5*  33.4*  
PLT  225  220 GRANS  85*  84* LYMPH  6*  13* EOS  1  1 Microbiology No results for input(s): CULT in the last 72 hours. Imaging- 
 
 
Results from Hospital Encounter encounter on 10/05/18 XR CHEST PORT Narrative EXAM: Portable chest 
 
HISTORY: Chest pain and chills. Shortness of breath. COMPARISON: 10/5/2018 TECHNIQUE: Single portable view. _______________ FINDINGS: 
 
Cardiac size is normal.  The lungs are clear. Pulmonary vasculature is normal.  
The visualized bony structures are normal. Right central venous catheter tip in 
mid superior vena cava. 
 
_______________ Impression IMPRESSION: 
 
No evidence of active pulmonary process. Interval insertion right central venous 
catheter. No pneumothorax. Results from Hospital Encounter encounter on 05/13/16 CT ABD PELV WO CONT Narrative Procedure:  CT Abdomen, Pelvis without Contrast. 
 
Indication:  Nausea, vomiting, diarrhea. Comparison:  None. Technique:  Helical volumetric CT imaging of the abdomen and pelvis is performed 
at 5 mm axial sections without IV or oral contrast administration. Coronal and 
sagittal multiplanar reconstruction images are generated for improved anatomic 
delineation. Radiation dose:   mGy-cm. Findings: Abdomen -- 
 
Lung Bases:  Subtle groundglass nodular densities are noted in the right lung base region. Nonspecific but probably favorable to postinflammatory changes. In the right lower lobe, there is a focal area of stellate density, at the 
convergent point of the right lower lobe bronchovascular structures (axial 
#9-10), measuring 1.2 x 0.7 cm. Most likely representing a focal area of 
scarring. Developing neoplastic process is felt far less likely but cannot be 
excluded entirely. Correlation with any available prior studies is recommended. Liver:  Pronounced sclerotic changes. Pancreas:  Fatty replacement of the pancreatic head, neck and proximal body. There is a suggestion of a possible nodular structure at the body of the 
pancreas measuring 1.0 x 0.7 cm (axial #32). Spleen, Gallbladder, Adrenal Glands:  Unremarkable. Kidneys:  Residual contrast is noted in the renal pelvis bilaterally (reportedly 
the patient has had CT scan earlier in the day at [facility] - 
unavailable for review at this time). The kidneys appear grossly unremarkable 
otherwise. Gastrointestinal Tract, Abdominal Wall, Mesentery: - Unremarkable stomach. - No acute findings along the small bowel. No small bowel obstruction.   
- Normal appendix.   
- No acute diverticulitis or colitis. A few scattered diverticula are noted. The colon lumen contains fluid nearly throughout the length of the colon, 
presumably related to the patient's history of diarrhea. - No mesenteric adenopathy. Retroperitoneum:  A few scattered nodes are identified. The largest 
retroperitoneal node measures about 1.8 x 1.1 cm (axial #36). Vascular:  Non-aneurysmal aorta. Pelvis -- 
 
Urinary Bladder:  Completely opacified with excreted contrast-containing urine. The bladder appears unremarkable. Prostate:  Unremarkable. Rectum:  Mildly distended with fluid. Pelvic sidewall:  No adenopathy. No free fluid.   In the right inguinal canal, 
 an ovoid intermediate attenuation structure is identified, measuring 3.3 x 3.0 x 
5.5 cm. Most likely representing a retracted right testicle. The left testicle 
appears to be located within the left scrotal sac, measuring 3.5 x 3.1 x 4.6 cm. Bones:  No acute osseous findings. Impression Impression: 1. Pronounced cirrhotic changes. 2.  Pancreatic nodule in the body segment (7.4 cm from the splenic hilum region) 
with the background of fatty infiltration of the pancreas. Recommend 
multiphasic CT or MR for further delineation on routine basis. 3.  Colon filled with fluid. Rectum mildly distended with fluid. Probably 
related to diarrhea. 4.  Probable scar tissue in the right lower lobe. Doubtful for neoplastic 
process. Recommend comparison with prior studies from outside sources to 
ascertain stability. If no priors are available, CT follow-up is recommended to 
ascertain stability. 5.  Retracted right testicle. 
   
 
 
---------------------------------------------------------------------------------------------------------------I have independently examined the patient and reviewed all lab studies and imgaing as well as review of nursing notes Christy Deleon MD,FACP 
10/12/2018 [facility] [phone]

## 2018-10-12 NOTE — PROGRESS NOTES
Problem: Falls - Risk of 
Goal: *Absence of Falls Document [facility] Fall Risk and appropriate interventions in the flowsheet. Outcome: Progressing Towards Goal 
Fall Risk Interventions: 
Mobility Interventions: Patient to call before getting OOB Medication Interventions: Patient to call before getting OOB Elimination Interventions: Call light in reach Problem: Pressure Injury - Risk of 
Goal: *Prevention of pressure injury Document Rajesh Scale and appropriate interventions in the flowsheet. Outcome: Progressing Towards Goal 
Pressure Injury Interventions: 
Sensory Interventions: Assess changes in LOC Moisture Interventions: Absorbent underpads Activity Interventions: Increase time out of bed, Pressure redistribution bed/mattress(bed type) Mobility Interventions: HOB 30 degrees or less Nutrition Interventions: Document food/fluid/supplement intake

## 2018-10-13 LAB
ADMINISTERED INITIALS, ADMINIT: NORMAL
ANION GAP SERPL CALC-SCNC: 11 MMOL/L (ref 3–18)
ANION GAP SERPL CALC-SCNC: 7 MMOL/L (ref 3–18)
BASOPHILS # BLD: 0 K/UL (ref 0–0.1)
BASOPHILS NFR BLD: 0 % (ref 0–2)
BUN SERPL-MCNC: 6 MG/DL (ref 7–18)
BUN SERPL-MCNC: 7 MG/DL (ref 7–18)
BUN/CREAT SERPL: 10 (ref 12–20)
BUN/CREAT SERPL: 6 (ref 12–20)
CALCIUM SERPL-MCNC: 8.2 MG/DL (ref 8.5–10.1)
CALCIUM SERPL-MCNC: 8.3 MG/DL (ref 8.5–10.1)
CHLORIDE SERPL-SCNC: 106 MMOL/L (ref 100–108)
CHLORIDE SERPL-SCNC: 108 MMOL/L (ref 100–108)
CO2 SERPL-SCNC: 22 MMOL/L (ref 21–32)
CO2 SERPL-SCNC: 26 MMOL/L (ref 21–32)
CREAT SERPL-MCNC: 0.68 MG/DL (ref 0.6–1.3)
CREAT SERPL-MCNC: 0.97 MG/DL (ref 0.6–1.3)
D50 ADMINISTERED, D50ADM: 0 ML
D50 ORDER, D50ORD: 0 ML
DIFFERENTIAL METHOD BLD: ABNORMAL
EOSINOPHIL # BLD: 0.1 K/UL (ref 0–0.4)
EOSINOPHIL NFR BLD: 1 % (ref 0–5)
ERYTHROCYTE [DISTWIDTH] IN BLOOD BY AUTOMATED COUNT: 13.4 % (ref 11.6–14.5)
EST. AVERAGE GLUCOSE BLD GHB EST-MCNC: 220 MG/DL
GLUCOSE BLD STRIP.AUTO-MCNC: 110 MG/DL (ref 70–110)
GLUCOSE BLD STRIP.AUTO-MCNC: 135 MG/DL (ref 70–110)
GLUCOSE BLD STRIP.AUTO-MCNC: 168 MG/DL (ref 70–110)
GLUCOSE BLD STRIP.AUTO-MCNC: 218 MG/DL (ref 70–110)
GLUCOSE BLD STRIP.AUTO-MCNC: 365 MG/DL (ref 70–110)
GLUCOSE BLD STRIP.AUTO-MCNC: 383 MG/DL (ref 70–110)
GLUCOSE BLD STRIP.AUTO-MCNC: 89 MG/DL (ref 70–110)
GLUCOSE BLD STRIP.AUTO-MCNC: 91 MG/DL (ref 70–110)
GLUCOSE SERPL-MCNC: 121 MG/DL (ref 74–99)
GLUCOSE SERPL-MCNC: 444 MG/DL (ref 74–99)
GLUCOSE, GLC: 110 MG/DL
GLUCOSE, GLC: 135 MG/DL
GLUCOSE, GLC: 218 MG/DL
GLUCOSE, GLC: 383 MG/DL
GLUCOSE, GLC: 89 MG/DL
GLUCOSE, GLC: 91 MG/DL
HBA1C MFR BLD: 9.3 % (ref 4.2–5.6)
HCT VFR BLD AUTO: 30.9 % (ref 36–48)
HGB BLD-MCNC: 10.2 G/DL (ref 13–16)
HIGH TARGET, HITG: 180 MG/DL
INSULIN ADMINSTERED, INSADM: 0 UNITS/HOUR
INSULIN ADMINSTERED, INSADM: 1.6 UNITS/HOUR
INSULIN ADMINSTERED, INSADM: 6.5 UNITS/HOUR
INSULIN ORDER, INSORD: 0 UNITS/HOUR
INSULIN ORDER, INSORD: 1.6 UNITS/HOUR
INSULIN ORDER, INSORD: 6.5 UNITS/HOUR
LOW TARGET, LOT: 140 MG/DL
LYMPHOCYTES # BLD: 0.8 K/UL (ref 0.9–3.6)
LYMPHOCYTES NFR BLD: 9 % (ref 21–52)
MAGNESIUM SERPL-MCNC: 1.5 MG/DL (ref 1.6–2.6)
MAGNESIUM SERPL-MCNC: 1.7 MG/DL (ref 1.6–2.6)
MCH RBC QN AUTO: 28.5 PG (ref 24–34)
MCHC RBC AUTO-ENTMCNC: 33 G/DL (ref 31–37)
MCV RBC AUTO: 86.3 FL (ref 74–97)
MINUTES UNTIL NEXT BG, NBG: 120 MIN
MINUTES UNTIL NEXT BG, NBG: 60 MIN
MONOCYTES # BLD: 0.8 K/UL (ref 0.05–1.2)
MONOCYTES NFR BLD: 9 % (ref 3–10)
MULTIPLIER, MUL: 0
MULTIPLIER, MUL: 0.01
MULTIPLIER, MUL: 0.02
NEUTS SEG # BLD: 7.3 K/UL (ref 1.8–8)
NEUTS SEG NFR BLD: 81 % (ref 40–73)
ORDER INITIALS, ORDINIT: NORMAL
PHOSPHATE SERPL-MCNC: 3.1 MG/DL (ref 2.5–4.9)
PLATELET # BLD AUTO: 254 K/UL (ref 135–420)
PMV BLD AUTO: 9.6 FL (ref 9.2–11.8)
POTASSIUM SERPL-SCNC: 3.3 MMOL/L (ref 3.5–5.5)
POTASSIUM SERPL-SCNC: 4.5 MMOL/L (ref 3.5–5.5)
RBC # BLD AUTO: 3.58 M/UL (ref 4.7–5.5)
SODIUM SERPL-SCNC: 139 MMOL/L (ref 136–145)
SODIUM SERPL-SCNC: 141 MMOL/L (ref 136–145)
WBC # BLD AUTO: 8.9 K/UL (ref 4.6–13.2)

## 2018-10-13 PROCEDURE — 74011250637 HC RX REV CODE- 250/637: Performed by: NURSE PRACTITIONER

## 2018-10-13 PROCEDURE — 74011636637 HC RX REV CODE- 636/637: Performed by: INTERNAL MEDICINE

## 2018-10-13 PROCEDURE — 74011250636 HC RX REV CODE- 250/636: Performed by: NURSE PRACTITIONER

## 2018-10-13 PROCEDURE — 97161 PT EVAL LOW COMPLEX 20 MIN: CPT

## 2018-10-13 PROCEDURE — 74011250637 HC RX REV CODE- 250/637: Performed by: HOSPITALIST

## 2018-10-13 PROCEDURE — 97535 SELF CARE MNGMENT TRAINING: CPT

## 2018-10-13 PROCEDURE — 85025 COMPLETE CBC W/AUTO DIFF WBC: CPT | Performed by: HOSPITALIST

## 2018-10-13 PROCEDURE — 74011250636 HC RX REV CODE- 250/636: Performed by: INTERNAL MEDICINE

## 2018-10-13 PROCEDURE — 83735 ASSAY OF MAGNESIUM: CPT | Performed by: HOSPITALIST

## 2018-10-13 PROCEDURE — 74011250637 HC RX REV CODE- 250/637: Performed by: INTERNAL MEDICINE

## 2018-10-13 PROCEDURE — 74011000258 HC RX REV CODE- 258: Performed by: HOSPITALIST

## 2018-10-13 PROCEDURE — 74011250636 HC RX REV CODE- 250/636: Performed by: HOSPITALIST

## 2018-10-13 PROCEDURE — 80048 BASIC METABOLIC PNL TOTAL CA: CPT | Performed by: HOSPITALIST

## 2018-10-13 PROCEDURE — 74011636637 HC RX REV CODE- 636/637: Performed by: NURSE PRACTITIONER

## 2018-10-13 PROCEDURE — 74011636637 HC RX REV CODE- 636/637: Performed by: HOSPITALIST

## 2018-10-13 PROCEDURE — 97116 GAIT TRAINING THERAPY: CPT

## 2018-10-13 PROCEDURE — 97165 OT EVAL LOW COMPLEX 30 MIN: CPT

## 2018-10-13 PROCEDURE — 65270000029 HC RM PRIVATE

## 2018-10-13 PROCEDURE — 82962 GLUCOSE BLOOD TEST: CPT

## 2018-10-13 RX ORDER — INSULIN GLARGINE 100 [IU]/ML
35 INJECTION, SOLUTION SUBCUTANEOUS DAILY
Status: DISCONTINUED | OUTPATIENT
Start: 2018-10-13 | End: 2018-10-14

## 2018-10-13 RX ORDER — POTASSIUM CHLORIDE 750 MG/1
20 TABLET, FILM COATED, EXTENDED RELEASE ORAL
Status: COMPLETED | OUTPATIENT
Start: 2018-10-13 | End: 2018-10-13

## 2018-10-13 RX ORDER — INSULIN LISPRO 100 [IU]/ML
INJECTION, SOLUTION INTRAVENOUS; SUBCUTANEOUS
Status: DISCONTINUED | OUTPATIENT
Start: 2018-10-13 | End: 2018-10-17 | Stop reason: HOSPADM

## 2018-10-13 RX ORDER — INSULIN GLARGINE 100 [IU]/ML
35 INJECTION, SOLUTION SUBCUTANEOUS DAILY
Status: DISCONTINUED | OUTPATIENT
Start: 2018-10-13 | End: 2018-10-13

## 2018-10-13 RX ORDER — MAGNESIUM SULFATE HEPTAHYDRATE 40 MG/ML
2 INJECTION, SOLUTION INTRAVENOUS ONCE
Status: COMPLETED | OUTPATIENT
Start: 2018-10-13 | End: 2018-10-13

## 2018-10-13 RX ADMIN — METOPROLOL TARTRATE 75 MG: 50 TABLET ORAL at 22:45

## 2018-10-13 RX ADMIN — INSULIN LISPRO 15 UNITS: 100 INJECTION, SOLUTION INTRAVENOUS; SUBCUTANEOUS at 22:46

## 2018-10-13 RX ADMIN — HEPARIN SODIUM 5000 UNITS: 5000 INJECTION INTRAVENOUS; SUBCUTANEOUS at 22:45

## 2018-10-13 RX ADMIN — PREDNISONE 10 MG: 5 TABLET ORAL at 08:54

## 2018-10-13 RX ADMIN — POTASSIUM CHLORIDE 20 MEQ: 750 TABLET, FILM COATED, EXTENDED RELEASE ORAL at 08:54

## 2018-10-13 RX ADMIN — INSULIN GLARGINE 35 UNITS: 100 INJECTION, SOLUTION SUBCUTANEOUS at 08:54

## 2018-10-13 RX ADMIN — SODIUM CHLORIDE 6.5 UNITS/HR: 900 INJECTION, SOLUTION INTRAVENOUS at 00:31

## 2018-10-13 RX ADMIN — MAGNESIUM SULFATE IN WATER 2 G: 40 INJECTION, SOLUTION INTRAVENOUS at 13:09

## 2018-10-13 RX ADMIN — Medication 10 ML: at 22:47

## 2018-10-13 RX ADMIN — CEFAZOLIN SODIUM 2 G: 2 SOLUTION INTRAVENOUS at 13:09

## 2018-10-13 RX ADMIN — HEPARIN SODIUM 5000 UNITS: 5000 INJECTION INTRAVENOUS; SUBCUTANEOUS at 18:09

## 2018-10-13 RX ADMIN — INSULIN LISPRO 3 UNITS: 100 INJECTION, SOLUTION INTRAVENOUS; SUBCUTANEOUS at 13:09

## 2018-10-13 RX ADMIN — PANTOPRAZOLE SODIUM 40 MG: 40 TABLET, DELAYED RELEASE ORAL at 22:45

## 2018-10-13 RX ADMIN — POTASSIUM CHLORIDE 20 MEQ: 750 TABLET, FILM COATED, EXTENDED RELEASE ORAL at 10:00

## 2018-10-13 RX ADMIN — Medication 20 ML: at 18:10

## 2018-10-13 RX ADMIN — VENLAFAXINE HYDROCHLORIDE 75 MG: 37.5 TABLET ORAL at 22:45

## 2018-10-13 RX ADMIN — CEFAZOLIN SODIUM 2 G: 2 SOLUTION INTRAVENOUS at 05:35

## 2018-10-13 RX ADMIN — METOPROLOL TARTRATE 75 MG: 50 TABLET ORAL at 08:54

## 2018-10-13 RX ADMIN — POTASSIUM CHLORIDE 20 MEQ: 750 TABLET, FILM COATED, EXTENDED RELEASE ORAL at 05:34

## 2018-10-13 RX ADMIN — Medication 10 ML: at 18:10

## 2018-10-13 RX ADMIN — Medication 10 ML: at 05:35

## 2018-10-13 RX ADMIN — CEFAZOLIN SODIUM 2 G: 2 SOLUTION INTRAVENOUS at 22:47

## 2018-10-13 RX ADMIN — HEPARIN SODIUM 5000 UNITS: 5000 INJECTION INTRAVENOUS; SUBCUTANEOUS at 08:54

## 2018-10-13 NOTE — PROGRESS NOTES
Problem: Falls - Risk of 
Goal: *Absence of Falls Document Sara Siegel Fall Risk and appropriate interventions in the flowsheet. Outcome: Progressing Towards Goal 
Fall Risk Interventions: 
Mobility Interventions: Patient to call before getting OOB Medication Interventions: Patient to call before getting OOB Elimination Interventions: Call light in reach Problem: Pressure Injury - Risk of 
Goal: *Prevention of pressure injury Document Rajesh Scale and appropriate interventions in the flowsheet. Outcome: Progressing Towards Goal 
Pressure Injury Interventions: 
Sensory Interventions: Assess changes in LOC Moisture Interventions: Absorbent underpads Activity Interventions: Increase time out of bed Mobility Interventions: HOB 30 degrees or less Nutrition Interventions: Document food/fluid/supplement intake

## 2018-10-13 NOTE — PROGRESS NOTES
Problem: Self Care Deficits Care Plan (Adult) Goal: *Acute Goals and Plan of Care (Insert Text) Occupational Therapy EVALUATION/discharge Patient: Ariana Ramon (62 y.o. male) Date: 10/13/2018 Primary Diagnosis: Sepsis ([address] Diabetic foot ([address] Sepsis ([address] second toe Diabetic foot ulcer ([address] Sepsis ([address] Septic arthritis of IP joint of toe, right ([address] 
necrotic foot Procedure(s) (LRB): EXTREMITY IRRIGATION AND DEBRIDEMENT AND DELAYED CLOSURE OF FOOT (Right) 1 Day Post-Op Precautions:   Fall (wb at R heel for pivot ) ASSESSMENT AND RECOMMENDATIONS: 
Based on the objective data described below, the patient presents to be S/independent for ADLs and transfers. Pt observed walking down the hallway with physical therapy with RW. Pt reports he has been ambulating to the toilet himself while maintaining WB precautions. Pt this session participated with LB dressing, toilet transfers and toileting. Pt was left seated in chair at the end of session in NAD. Skilled occupational therapy is not indicated at this time. Discharge Recommendations: Home Health and To Be Determined Further Equipment Recommendations for Discharge: N/A Barriers to Learning/Limitations: None Compensate with: visual, verbal, tactile, kinesthetic cues/model COMPLEXITY Eval Complexity: History: LOW Complexity : Brief history review ; Examination: LOW Complexity : 1-3 performance deficits relating to physical, cognitive , or psychosocial skils that result in activity limitations and / or participation restrictions ; Decision Making:LOW Complexity : No comorbidities that affect functional and no verbal or physical assistance needed to complete eval tasks  Assessment: Low Complexity G-CODES:  
 
Self Care  Current  CI= 1-19%  D/C  CI= 1-19%. The severity rating is based on the Level of Assistance required for Functional Mobility and ADLs. SUBJECTIVE:  
Patient stated I am doing better.  OBJECTIVE DATA SUMMARY:  
 
Past Medical History:  
Diagnosis Date  Diabetes ([facility]  Hypercholesteremia  Myocardial infarct ([facility] [address]  Sarcoidosis Past Surgical History:  
Procedure Laterality Date  HX CORONARY STENT PLACEMENT    
 HX HEART CATHETERIZATION Prior Level of Function/Home Situation:  
Home Situation Home Environment: Private residence # Steps to Enter: 3 Rails to Enter: Yes Hand Rails : Bilateral 
One/Two Story Residence: One story Living Alone: No 
Support Systems: Spouse/Significant Other/Partner, Child(kamron) Patient Expects to be Discharged to[de-identified] Rehabilitation facility Current DME Used/Available at Home: Grab bars, Walker, rolling, Cane, straight Tub or Shower Type: Tub/Shower combination 
[x]     Right hand dominant   []     Left hand dominant Cognitive/Behavioral Status: 
Neurologic State: Alert Orientation Level: Oriented X4 Cognition: Appropriate for age attention/concentration; Follows commands Safety/Judgement: Fall prevention Skin: intact Edema: none Vision/Perceptual:   
Tracking: Able to track stimulus in all quadrants w/o difficulty Coordination: 
Coordination: Within functional limits Fine Motor Skills-Upper: Left Intact; Right Intact Gross Motor Skills-Upper: Left Intact; Right Intact Balance: 
Sitting: Intact Standing: Intact; With support Strength: 
Strength: Generally decreased, functional 
 
Tone & Sensation: 
Sensation: Impaired (numbness at fingertips due to diabetic neuropathy) Range of Motion: 
AROM: Within functional limits Functional Mobility and Transfers for ADLs: 
Bed Mobility: 
Transfers: 
Sit to Stand: Contact guard assistance Toilet Transfer : Supervision ADL Assessment: 
Feeding: Independent Oral Facial Hygiene/Grooming: Independent Upper Body Dressing: Independent Lower Body Dressing: Independent Toileting: Supervision ADL Intervention: Toileting Toileting Assistance: Independent Bladder Hygiene: Independent Clothing Management: Independent Cognitive Retraining Safety/Judgement: Fall prevention Therapeutic Exercise: 
 
 
Pain: 
Pt reports 0/10 pain or discomfort prior to treatment.   
Pt reports 0/10 pain or discomfort post treatment. Activity Tolerance:  
Good Please refer to the flowsheet for vital signs taken during this treatment. After treatment:  
[x]  Patient left in no apparent distress sitting up in chair 
[]  Patient left in no apparent distress in bed 
[x]  Call bell left within reach 
[]  Nursing notified 
[]  Caregiver present 
[]  Bed alarm activated COMMUNICATION/EDUCATION:  
Communication/Collaboration: 
[x]      Home safety education was provided and the patient/caregiver indicated understanding. [x]      Patient/family have participated as able and agree with findings and recommendations. []      Patient is unable to participate in plan of care at this time. Graciela Thompson OTR/L Time Calculation: 18 mins

## 2018-10-13 NOTE — PROGRESS NOTES
[facility] Daily Progress Note Daily progress Note Patient: Armida Ramirez MRN: [id number]  CSN: [id number] YOB: 1970  Age: 41 y.o. Sex: male DOA: 10/5/2018 LOS:  LOS: 8 days Chief Complaint:  Sepsis Interval History: 42 y/o [place] American male with hx of DM, HTN, sarcoidosis, afib, HLD, GERD, daily tobacco abuse presented to the ED on 10/5 with sepsis. Pt states he had an infection in his right foot ~one month. He was seen at [facility] and they wanted to admit him for further work up and IV Abx, however pt refused admission. Since then, pt reports the area has slowly gotten worse with increased swelling and drainage. Xray right foot showed deossification along both sides of DIP joint second toe, could represent early osteomyelitis. A1C 9.6% this admission. Pt admitted for further management of care, PCCM consulted, podiatry consulted, pt started on IV Abx. He is s/p right second toe amputation by Dr. Padron Last on 10/6 with clear margins. Surgical path with acute and chronic osteomyelitis. ID consulted on 10/9. Cultures with MSSA and FBS. PICC placed on 10/11. ID recommends Ancef 2 grams IV TID through 11/18 with weekly labs (CBC w/ diff, BMP and ESR). S/p irrigation and debridement, delayed closure of right foot by Dr. Manuel Anderson on 10/12. Awaiting authorization for [facility] [address] Assessment/Plan:  
 
Patient Active Problem List  
Diagnosis Code  Sarcoidosis D86.9  DKA, type 1 ([address] E10.10  Sepsis ([address] A41.9  Diabetic foot ([address] E11.8  Diabetic foot ulcer ([address] E11.621, L97.509  Septic arthritis of IP joint of toe, right ([address] M00.9 A/P: 
Sepsis 
- secondary to diabetic foot ulcer 
- lactic acid now normal 
- ID following, recommend Ancef TID through 11/8 
- surgical culture with few streptococci beta hemolytic group B, rare staph aureus 
- blood cultures collected 10/5 NGTD x 2 Osteomyelitis of right 2nd digit - podiatry following - s/p amputation right 2nd digit on 10/6 
- path with acute and chronic OM 
- surgical culture with few streptococci beta hemolytic Group B, rare staph aureus 
- blood cultures NGTD x 2 
- ID recommends Ancef TID through 11/8 
- s/p irrigation and debridement, delayed closure of right foot by Dr. Girish Chen on 10/12 
- PT eval- right heel touch only for pivot Afib 
- tele monitoring 
- was on  mg PTA- currently on hold Diabetes - A1C 9.6% this admission 
- was placed on insulin gtt last night 10/12 
- restart correctional SSI (very insulin resistant), Lantus 35 units daily 
- d/c insulin gtt @11am today 10/13 Hx of sarcoidosis - Prednisone 10 mg daily Hypertension - Lopressor 75 mg BID 
 
DVT Prophylaxis - SCD's BLE 
- Heparin subcutaneously TID Disposition - plan to d/c to [place] [address] [address] - awaiting auth CELINE Irby-JENNIFER [address] [place], [place] [address]ty Group Hospitalist Division Pager:  [phone] Office:  [phone] Subjective: No complaints at this time. S/p I & D and delayed closure of right foot yesterday. Was on insulin gtt but weaned off today. Pt admits to not checking his blood glucose at home. Objective:  
  
Visit Vitals  /66 (BP 1 Location: Left arm, BP Patient Position: At rest)  Pulse 77  Temp 98.2 °F (36.8 °C)  Resp 18  Ht 6' 2\" (1.88 m)  Wt 85.3 kg (188 lb)  SpO2 100%  BMI 24.14 kg/m2 Physical Exam: 
General appearance: alert, cooperative, no distress, appears stated age Head: Normocephalic, without obvious abnormality, atraumatic Lungs: clear to auscultation bilaterally Heart: regular rate and rhythm, S1, S2 normal, no murmur, click, rub or gallop Abdomen: soft, non tender, non distended. Normoactive bowel sounds. Extremities: dressing right foot intact Skin: Skin color, texture, turgor normal. No rashes or lesions Neurologic: Grossly normal 
PSY: Mood and affect normal, appropriately behaved Intake and Output: 
Current Shift:    
Last three shifts:  10/11 1901 - 10/13 0700 In: 938.5 [P.O.:480; I.V.:458.5] Out: 1875 [[id number]] Recent Results (from the past 24 hour(s)) GLUCOSE, POC Collection Time: 10/12/18 11:49 AM  
Result Value Ref Range Glucose (POC) 118 (H) 70 - 110 mg/dL GLUCOSE, POC Collection Time: 10/12/18  5:00 PM  
Result Value Ref Range Glucose (POC) 151 (H) 70 - 110 mg/dL Maye Hylan Collection Time: 10/12/18  8:20 PM  
Result Value Ref Range Vancomycin,trough 9.1 (L) 10.0 - 20.0 ug/mL Reported dose date: [id number] Reported dose time: 300 Reported dose: 1000 MG UNITS METABOLIC PANEL, BASIC Collection Time: 10/12/18  8:20 PM  
Result Value Ref Range Sodium 139 136 - 145 mmol/L Potassium 4.5 3.5 - 5.5 mmol/L Chloride 106 100 - 108 mmol/L  
 CO2 22 21 - 32 mmol/L Anion gap 11 3.0 - 18 mmol/L Glucose 444 (HH) 74 - 99 mg/dL BUN 6 (L) 7.0 - 18 MG/DL Creatinine 0.97 0.6 - 1.3 MG/DL  
 BUN/Creatinine ratio 6 (L) 12 - 20 GFR est AA >60 >60 ml/min/1.73m2 GFR est non-AA >60 >60 ml/min/1.73m2 Calcium 8.3 (L) 8.5 - 10.1 MG/DL MAGNESIUM Collection Time: 10/12/18  8:20 PM  
Result Value Ref Range Magnesium 1.7 1.6 - 2.6 mg/dL PHOSPHORUS Collection Time: 10/12/18  8:20 PM  
Result Value Ref Range Phosphorus 3.1 2.5 - 4.9 MG/DL  
GLUCOSE, POC Collection Time: 10/12/18  8:46 PM  
Result Value Ref Range Glucose (POC) 391 (H) 70 - 110 mg/dL GLUCOSE, POC Collection Time: 10/12/18 10:24 PM  
Result Value Ref Range Glucose (POC) 518 (HH) 70 - 110 mg/dL GLUCOSE, POC Collection Time: 10/13/18 12:25 AM  
Result Value Ref Range Glucose (POC) 383 (H) 70 - 110 mg/dL Cyril Plume Collection Time: 10/13/18 12:30 AM  
Result Value Ref Range Glucose 383 mg/dL Insulin order 6.5 units/hour Insulin adminstered 6.5 units/hour Multiplier 0.020 Low target 140 mg/dL High target 180 mg/dL D50 order 0.0 ml  
 D50 administered 0.00 ml Minutes until next BG 60 min Order initials jw Administered initials jw GLUCOSE, POC Collection Time: 10/13/18  1:32 AM  
Result Value Ref Range Glucose (POC) 218 (H) 70 - 110 mg/dL Dane Mendenhalller Collection Time: 10/13/18  1:33 AM  
Result Value Ref Range Glucose 218 mg/dL Insulin order 1.6 units/hour Insulin adminstered 1.6 units/hour Multiplier 0.010 Low target 140 mg/dL High target 180 mg/dL D50 order 0.0 ml  
 D50 administered 0.00 ml Minutes until next BG 60 min Order initials anp Administered initials anp MAGNESIUM Collection Time: 10/13/18  2:30 AM  
Result Value Ref Range Magnesium 1.5 (L) 1.6 - 2.6 mg/dL CBC WITH AUTOMATED DIFF Collection Time: 10/13/18  2:30 AM  
Result Value Ref Range WBC 8.9 4.6 - 13.2 K/uL  
 RBC 3.58 (L) 4.70 - 5.50 M/uL  
 HGB 10.2 (L) 13.0 - 16.0 g/dL HCT 30.9 (L) 36.0 - 48.0 % MCV 86.3 74.0 - 97.0 FL  
 MCH 28.5 24.0 - 34.0 PG  
 MCHC 33.0 31.0 - 37.0 g/dL  
 RDW 13.4 11.6 - 14.5 % PLATELET 716 317 - 264 K/uL MPV 9.6 9.2 - 11.8 FL  
 NEUTROPHILS 81 (H) 40 - 73 % LYMPHOCYTES 9 (L) 21 - 52 % MONOCYTES 9 3 - 10 % EOSINOPHILS 1 0 - 5 % BASOPHILS 0 0 - 2 %  
 ABS. NEUTROPHILS 7.3 1.8 - 8.0 K/UL  
 ABS. LYMPHOCYTES 0.8 (L) 0.9 - 3.6 K/UL  
 ABS. MONOCYTES 0.8 0.05 - 1.2 K/UL  
 ABS. EOSINOPHILS 0.1 0.0 - 0.4 K/UL  
 ABS. BASOPHILS 0.0 0.0 - 0.1 K/UL  
 DF AUTOMATED METABOLIC PANEL, BASIC Collection Time: 10/13/18  2:30 AM  
Result Value Ref Range Sodium 141 136 - 145 mmol/L Potassium 3.3 (L) 3.5 - 5.5 mmol/L Chloride 108 100 - 108 mmol/L  
 CO2 26 21 - 32 mmol/L Anion gap 7 3.0 - 18 mmol/L Glucose 121 (H) 74 - 99 mg/dL  BUN 7 7.0 - 18 MG/DL  
 Creatinine 0.68 0.6 - 1.3 MG/DL  
 BUN/Creatinine ratio 10 (L) 12 - 20 GFR est AA >60 >60 ml/min/1.73m2 GFR est non-AA >60 >60 ml/min/1.73m2 Calcium 8.2 (L) 8.5 - 10.1 MG/DL  
GLUCOSE, POC Collection Time: 10/13/18  2:36 AM  
Result Value Ref Range Glucose (POC) 135 (H) 70 - 110 mg/dL Aleisha Estrada Collection Time: 10/13/18  2:38 AM  
Result Value Ref Range Glucose 135 mg/dL Insulin order 0.0 units/hour Insulin adminstered 0.0 units/hour Multiplier 0.000 Low target 140 mg/dL High target 180 mg/dL D50 order 0.0 ml  
 D50 administered 0.00 ml Minutes until next BG 60 min Order initials jw Administered initials jw GLUCOSE, POC Collection Time: 10/13/18  3:40 AM  
Result Value Ref Range Glucose (POC) 110 70 - 110 mg/dL Aleisha Estrada Collection Time: 10/13/18  3:41 AM  
Result Value Ref Range Glucose 110 mg/dL Insulin order 0.0 units/hour Insulin adminstered 0.0 units/hour Multiplier 0.000 Low target 140 mg/dL High target 180 mg/dL D50 order 0.0 ml  
 D50 administered 0.00 ml Minutes until next  min Order initials anp Administered initials anp GLUCOSE, POC Collection Time: 10/13/18  5:42 AM  
Result Value Ref Range Glucose (POC) 91 70 - 110 mg/dL Aleisha Estrada Collection Time: 10/13/18  5:43 AM  
Result Value Ref Range Glucose 91 mg/dL Insulin order 0.0 units/hour Insulin adminstered 0.0 units/hour Multiplier 0.000 Low target 140 mg/dL High target 180 mg/dL D50 order 0.0 ml  
 D50 administered 0.00 ml Minutes until next  min Order initials mrd Administered initials mrd GLUCOSE, POC Collection Time: 10/13/18  7:46 AM  
Result Value Ref Range Glucose (POC) 89 70 - 110 mg/dL Aleisha Estrada Collection Time: 10/13/18  7:49 AM  
Result Value Ref Range Glucose 89 mg/dL Insulin order 0.0 units/hour Insulin adminstered 0.0 units/hour Multiplier 0.000 Low target 140 mg/dL High target 180 mg/dL D50 order 0.0 ml  
 D50 administered 0.00 ml Minutes until next  min Order initials sb Administered initials sb Lab Results Component Value Date/Time Glucose 121 (H) 10/13/2018 02:30 AM  
 Glucose 444 (HH) 10/12/2018 08:20 PM  
 Glucose 99 10/12/2018 04:40 AM  
 Glucose 106 (H) 10/11/2018 04:30 AM  
 Glucose 96 10/09/2018 05:15 AM  
  
 
Imaging: No results found. Medication List Reviewed: 
Current Facility-Administered Medications Medication Dose Route Frequency  potassium chloride SR (KLOR-CON 10) tablet 20 mEq  20 mEq Oral Q2H  
 insulin regular (NOVOLIN R, HUMULIN R) 100 Units in 0.9% sodium chloride 100 mL infusion  0-50 Units/hr IntraVENous TITRATE  glucose chewable tablet 16 g  4 Tab Oral PRN  
 dextrose (D50) infusion 12.5-25 g  25-50 mL IntraVENous PRN  
 ceFAZolin (ANCEF) 2g IVPB in 50 mL D5W  2 g IntraVENous Q8H  
 metoprolol tartrate (LOPRESSOR) tablet 75 mg  75 mg Oral Q12H  
 bacitracin 500 unit/gram packet 1 Packet  1 Packet Topical PRN  
 sodium chloride (NS) flush 10-30 mL  10-30 mL InterCATHeter PRN  
 sodium chloride (NS) flush 10 mL  10 mL InterCATHeter Q24H  
 sodium chloride (NS) flush 10 mL  10 mL InterCATHeter PRN  
 sodium chloride (NS) flush 10-40 mL  10-40 mL InterCATHeter Q8H  
 sodium chloride (NS) flush 20 mL  20 mL InterCATHeter Q24H  
 loperamide (IMODIUM) capsule 4 mg  4 mg Oral Q6H PRN  
 venlafaxine (EFFEXOR) tablet 75 mg  75 mg Oral QHS  pantoprazole (PROTONIX) tablet 40 mg  40 mg Oral QHS  acetaminophen (TYLENOL) tablet 650 mg  650 mg Oral Q6H PRN  
 ondansetron (ZOFRAN) injection 4 mg  4 mg IntraVENous Q6H PRN  
 heparin (porcine) injection 5,000 Units  5,000 Units SubCUTAneous Q8H  
 hydrALAZINE (APRESOLINE) 20 mg/mL injection 10 mg  10 mg IntraVENous Q6H PRN  predniSONE (DELTASONE) tablet 10 mg  10 mg Oral DAILY WITH BREAKFAST  morphine injection 2 mg  2 mg IntraVENous Q4H PRN  
 sodium chloride (NS) flush 5-10 mL  5-10 mL IntraVENous Q8H  
 sodium chloride (NS) flush 5-10 mL  5-10 mL IntraVENous PRN  
 oxyCODONE-acetaminophen (PERCOCET) 5-325 mg per tablet 2 Tab  2 Tab Oral Q4H PRN  
 glucose chewable tablet 16 g  16 g Oral PRN  
 glucagon (GLUCAGEN) injection 1 mg  1 mg IntraMUSCular PRN  
 dextrose (D50) infusion 12.5-25 g  25-50 mL IntraVENous PRN  
 sodium chloride (NS) flush 5-10 mL  5-10 mL IntraVENous PRN

## 2018-10-13 NOTE — PROGRESS NOTES
NUTRITION follow-up/Plan of care RECOMMENDATIONS:  
 
1. Cardiac; Diabetic diet 2. SF CIB TID 3. Monitor weight, labs and PO intake 4. RD to follow GOALS:  
 
1. Met/Ongoing: PO intake meets >75% of protein/calorie needs by 10/18 
2. Ongoing: Maintain weight (+/- 1-2 lb) by 10/18 ASSESSMENT:  
 
Weight status is classified as normal per BMI of 24.1. PO intake is adequate. Continue SF CIB TID for additional protein to help with wound healing. Labs noted.  over past 24 hours. Being followed by glycemic control team. Nutrition recommendations listed. RD to follow. Nutrition Risk:  []   High [x]  Moderate [] Low SUBJECTIVE/OBJECTIVE:  
 
10/8/18:  Pt reports his appetite was not good PTA but is now very good. He related experiencing a 49 lb weight loss \"when I was sick\" about 2 years ago. He state his weight loss has slowed down but he still lost 10 lbs in past year. He denies problems with chewing or swallowing and denies having food allergies. 10/13/18: S/P extremity irrigation and debridement and delayed closure of right foot for open open amputation site of right foot on 10/12. Patient NPO 10/11 for procedure but was postponed until yesterday 10/12. Patient with a good appetite and eating most of meals. Will continue SF CIB to help with wound healing. Noted patient now with order of Cardiac diet but with elevated blood sugars. Will add diabetic diet to diet order. Will monitor. Information Obtained From:  
[x] Chart Review [x] Patient 
[] Family/Caregiver 
[] Nurse/Physician  
[] Patient Rounds/Interdisciplinary Meeting Diet: Cardiac diet Patient Vitals for the past 100 hrs: 
 % Diet Eaten 10/12/18 1754 25 % 10/12/18 1026 100 % 10/11/18 1857 0 % 10/11/18 1757 100 % 10/11/18 1514 50 % 10/11/18 1353 0 % 10/11/18 1005 0 % 10/10/18 1805 75 % 10/10/18 1036 75 % 10/09/18 1830 75 % 10/09/18 0900 30 % 10/09/18 0701 0 % Medications: [x] Reviewed Encounter Diagnoses ICD-10-CM ICD-9-CM 1. Acute osteomyelitis ([facility] M86.10 730.00 2. Septic shock ([facility]) A41.9 038.9  
 R65.21 785.52  
  995.92  
3. Hypokalemia E87.6 276.8 4. Hypomagnesemia E83.42 275.2 Past Medical History:  
Diagnosis Date  Diabetes ([facility]  Hypercholesteremia  Myocardial infarct ([facility]  Sarcoidosis Labs:  Lab Results Component Value Date/Time Sodium 141 10/13/2018 02:30 AM  
 Potassium 3.3 (L) 10/13/2018 02:30 AM  
 Chloride 108 10/13/2018 02:30 AM  
 CO2 26 10/13/2018 02:30 AM  
 Anion gap 7 10/13/2018 02:30 AM  
 Glucose 121 (H) 10/13/2018 02:30 AM  
 BUN 7 10/13/2018 02:30 AM  
 Creatinine 0.68 10/13/2018 02:30 AM  
 Calcium 8.2 (L) 10/13/2018 02:30 AM  
 Magnesium 1.5 (L) 10/13/2018 02:30 AM  
 Phosphorus 3.1 10/12/2018 08:20 PM  
 Albumin 1.8 (L) 10/08/2018 07:37 AM  
 
Anthropometrics: BMI (calculated): 24.1 Last 3 Recorded Weights in this Encounter 10/05/18 1905 10/10/18 0425 10/11/18 7471 Weight: 79.8 kg (176 lb) 80.7 kg (178 lb) 85.3 kg (188 lb) Ht Readings from Last 1 Encounters:  
10/05/18 6' 2\" (1.88 m)  
 
[]  Weight Loss 
[x]  Weight Gain (? Accuracy) []  Weight Stable  
[]  New wt n/a on record Estimated Nutrition Needs:  
2489 Kcals/day Protein (g): 120 g Nutrition Problems Identified:  
[] Suboptimal PO intake  
[] Food Allergies [] Difficulty chewing/swallowing/poor dentition 
[] Constipation/Diarrhea  
[] Nausea/Vomiting  
[] None 
[x] Other: Wound healing Plan:  
[x] Therapeutic Diet 
[]  Obtained/adjusted food preferences/tolerances and/or snacks options [x]  Continue dietary supplements as prescribed  
[] Occupational therapy following for feeding techniques []  HS snack added  
[]  Modify diet texture  
[]  Modify diet for food allergies []  Assist with menu selection  
[x]  Monitor PO intake on meal rounds  
[x]  Continue inpatient monitoring and intervention []  Participated in discharge planning/Interdisciplinary rounds/Team meetings  
[]  Other:  
 
Education Needs: 
 [] Not appropriate for teaching at this time due to: 
 [x] Identified and addressed Nutrition Monitoring and Evaluation: 
 [x] Continue inpatient monitoring and interventions [] Other:  
 
[address]

## 2018-10-13 NOTE — PROGRESS NOTES
2000-no signs of distress. Ordered meds given throughout shift. Approximately 0000, placed patient on insulin drip for high glucose. Insulin paused at 0240. Bedside shift change report given to [address] [place] [address] (oncoming nurse) by Clyde Garcia (offgoing nurse). Report included the following information SBAR.

## 2018-10-13 NOTE — OP NOTES
[address] REPORT    Alex Monson  MR#: [id number]  : 1970  ACCOUNT #: [de-identified]   DATE OF SERVICE: 10/12/2018    SURGEON:  Alisha Wheeler DPM.    ASSISTANT:  Oumar Mcelroy PREOPERATIVE DIAGNOSIS:  Open right second toe amputation. POSTOPERATIVE DIAGNOSIS:  Open right second toe amputation. PROCEDURE PERFORMED:  Delayed closure of amputation site, right foot. ANESTHESIA:  Local with monitored anesthesia care. HEMOSTASIS:  None. ESTIMATED BLOOD LOSS:  15 mL. COMPLICATIONS:  None. SPECIMENS REMOVED:  None. IMPLANTS:  None. DESCRIPTION OF PROCEDURE IN DETAIL:  The patient was brought into the operating room, and the procedure was performed on the patient's hospital bed. The right limb was then scrubbed, draped and prepped in the usual manner. A thigh tourniquet was applied but was never inflated. Attention was then directed to the right distal foot, where an open 2nd toe amputation had been performed a week ago for a gangrenous and infected diabetic foot infection. He has progressed well since then. At this point, with a 15 blade, the edges were carefully debrided removing a thin layer of necrotic tissue present. The plantar and distal portion of the wound was debulked, taking down some of the subcutaneous fat pad plantarly. The 2nd metatarsal head was carefully inspected, and the white glistening cartilage appeared completely normal.  The wound edges were slightly undermined, so the medial and lateral flap were able to be conjoined. It was felt that this was able to be done without additional bone resection, and the 2nd metatarsal head was left intact. The area was then copiously lavaged with bacitracin-impregnated saline. At this point, deep subcutaneous sutures were thrown with 3-0 Vicryl to pull the 1st and 3rd metatarsal areas together.   The skin was reapproximated with 3-0 Prolene using multiple suture techniques, including vertical sutures, centrally to decrease pressure on the 2 flap areas. Xeroform, 4 x 4s, Kerlix and an Ace wrap under no tension was used as dressing. The patient tolerated the procedure and the anesthesia well.       LUZ Rico / MAGALIS  D: 10/12/2018 16:33     T: 10/13/2018 11:53  JOB #: [id number]

## 2018-10-13 NOTE — PROGRESS NOTES
Problem: Falls - Risk of 
Goal: *Absence of Falls Document Mayur Jang Fall Risk and appropriate interventions in the flowsheet. Outcome: Progressing Towards Goal 
Fall Risk Interventions: 
Mobility Interventions: Patient to call before getting OOB Medication Interventions: Patient to call before getting OOB Elimination Interventions: Patient to call for help with toileting needs Problem: Pressure Injury - Risk of 
Goal: *Prevention of pressure injury Document Rajesh Scale and appropriate interventions in the flowsheet. Outcome: Progressing Towards Goal 
Pressure Injury Interventions: 
Sensory Interventions: Assess changes in LOC Moisture Interventions: Absorbent underpads Activity Interventions: Increase time out of bed, Pressure redistribution bed/mattress(bed type) Mobility Interventions: Pressure redistribution bed/mattress (bed type) Nutrition Interventions: Document food/fluid/supplement intake

## 2018-10-13 NOTE — CONSULTS
Podiatry POST OP NOTE    Subjective: Patient is a 41year old male with a pMHx of DM II, HTN, sarcoidosis, a fib HLDP, and GERD who presents with sepsis to the [facility] Emergency room. He states that he has had an infection in the right foot for approx one month. He states that he was seen at the [facility] and they wanted to admit him for further work up and IV abx, however he refused. He states that since then the area has slowly gotten worse. He admits to having an increase in swelling and drainage from the area. He also admits to a current, every day smoker. He has no other pedal complaints at this time. Patient resting today. POD#1 Delayed closure right foot amputation site. Denies F/NS/C/N/V. No pain to surgical site  Feels \"great\"       Date of Surgery:  10.6.18    Referring Physician: Dr. Miles Jhaveri    Patient Active Problem List    Diagnosis Date Noted    Diabetic foot ulcer ([facility] 10/06/2018    Septic arthritis of IP joint of toe, right ([facility] [facility] 10/06/2018    Diabetic foot ([facility] [facility] 10/05/2018    Sarcoidosis 05/13/2016    DKA, type 1 ([facility] [facility] 05/13/2016    Sepsis ([facility] 05/13/2016     Past Medical History:   Diagnosis Date    Diabetes ([facility] [facility]     Hypercholesteremia     Myocardial infarct ([facility] [facility]     Sarcoidosis       History reviewed. No pertinent family history. Social History   Substance Use Topics    Smoking status: Current Every Day Smoker     Packs/day: 0.50    Smokeless tobacco: Never Used    Alcohol use No     Past Surgical History:   Procedure Laterality Date    HX CORONARY STENT PLACEMENT      HX HEART CATHETERIZATION        Prior to Admission medications    Medication Sig Start Date End Date Taking? Authorizing Provider   gemfibrozil (LOPID) 600 mg tablet Take 600 mg by mouth two (2) times a day. Yes Nery Olivera MD   GABAPENTIN PO Take  by mouth. Yes Nery Olivera MD   aspirin (ASPIRIN) 325 mg tablet Take 325 mg by mouth daily.    Yes Historical Provider   metroNIDAZOLE (FLAGYL) 500 mg tablet Take 1 Tab by mouth three (3) times daily. 16   Diego Smith MD   ciprofloxacin HCl (CIPRO) 250 mg tablet Take 1 Tab by mouth every twelve (12) hours. 16   Diego Smith MD   insulin detemir (LEVEMIR) 100 unit/mL injection 0.45 mL by SubCUTAneous route nightly. 16   Diego Smith MD   albuterol (PROVENTIL HFA, VENTOLIN HFA, PROAIR HFA) 90 mcg/actuation inhaler Take 2 Puffs by inhalation every four (4) hours as needed for Wheezing. Historical Provider   dextran 70-hypromellose (ARTIFICIAL TEARS) ophthalmic solution Administer 2 Drops to both eyes as needed. Historical Provider   glipiZIDE (GLUCOTROL) 10 mg tablet Take 10 mg by mouth two (2) times a day. Indications: TYPE 2 DIABETES MELLITUS    Historical Provider   metoprolol tartrate (LOPRESSOR) 50 mg tablet Take 50 mg by mouth two (2) times a day. Historical Provider   omeprazole (PRILOSEC) 20 mg capsule Take 20 mg by mouth daily. Historical Provider   predniSONE (DELTASONE) 10 mg tablet Take 25 mg by mouth daily (with breakfast). Indications: SARCOIDOSIS    Historical Provider   venlafaxine-SR [facility] 75 mg capsule Take  by mouth daily. Indications: POST TRAUMATIC STRESS DISORDER    Historical Provider   sildenafil citrate (VIAGRA) 100 mg tablet Take 100 mg by mouth as needed.     Historical Provider     No Known Allergies     Review of Systems:    No changes      Objective:     Patient Vitals for the past 8 hrs:   BP Temp Pulse Resp SpO2   10/13/18 0757 112/66 98.2 °F (36.8 °C) 77 18 100 %   10/13/18 0400 112/66 97.6 °F (36.4 °C) 77 18 100 %     Temp (24hrs), Av °F (36.7 °C), Min:97.2 °F (36.2 °C), Max:98.5 °F (36.9 °C)      Data Review:   Recent Results (from the past 24 hour(s))   GLUCOSE, POC    Collection Time: 10/12/18 11:49 AM   Result Value Ref Range    Glucose (POC) 118 (H) 70 - 110 mg/dL   GLUCOSE, POC    Collection Time: 10/12/18  5:00 PM   Result Value Ref Range    Glucose (POC) 151 (H) 70 - 110 mg/dL Landenalene Organ    Collection Time: 10/12/18  8:20 PM   Result Value Ref Range    Vancomycin,trough 9.1 (L) 10.0 - 20.0 ug/mL    Reported dose date: 20181012      Reported dose time: 300      Reported dose: 1000 MG UNITS   METABOLIC PANEL, BASIC    Collection Time: 10/12/18  8:20 PM   Result Value Ref Range    Sodium 139 136 - 145 mmol/L    Potassium 4.5 3.5 - 5.5 mmol/L    Chloride 106 100 - 108 mmol/L    CO2 22 21 - 32 mmol/L    Anion gap 11 3.0 - 18 mmol/L    Glucose 444 (HH) 74 - 99 mg/dL    BUN 6 (L) 7.0 - 18 MG/DL    Creatinine 0.97 0.6 - 1.3 MG/DL    BUN/Creatinine ratio 6 (L) 12 - 20      GFR est AA >60 >60 ml/min/1.73m2    GFR est non-AA >60 >60 ml/min/1.73m2    Calcium 8.3 (L) 8.5 - 10.1 MG/DL   MAGNESIUM    Collection Time: 10/12/18  8:20 PM   Result Value Ref Range    Magnesium 1.7 1.6 - 2.6 mg/dL   PHOSPHORUS    Collection Time: 10/12/18  8:20 PM   Result Value Ref Range    Phosphorus 3.1 2.5 - 4.9 MG/DL   GLUCOSE, POC    Collection Time: 10/12/18  8:46 PM   Result Value Ref Range    Glucose (POC) 391 (H) 70 - 110 mg/dL   GLUCOSE, POC    Collection Time: 10/12/18 10:24 PM   Result Value Ref Range    Glucose (POC) 518 (HH) 70 - 110 mg/dL   GLUCOSE, POC    Collection Time: 10/13/18 12:25 AM   Result Value Ref Range    Glucose (POC) 383 (H) 70 - 110 mg/dL   GLUCOSTABILIZER    Collection Time: 10/13/18 12:30 AM   Result Value Ref Range    Glucose 383 mg/dL    Insulin order 6.5 units/hour    Insulin adminstered 6.5 units/hour    Multiplier 0.020     Low target 140 mg/dL    High target 180 mg/dL    D50 order 0.0 ml    D50 administered 0.00 ml    Minutes until next BG 60 min    Order initials jw     Administered initials jw    GLUCOSE, POC    Collection Time: 10/13/18  1:32 AM   Result Value Ref Range    Glucose (POC) 218 (H) 70 - 110 mg/dL   GLUCOSTABILIZER    Collection Time: 10/13/18  1:33 AM   Result Value Ref Range    Glucose 218 mg/dL    Insulin order 1.6 units/hour    Insulin adminstered 1.6 units/hour    Multiplier 0.010     Low target 140 mg/dL    High target 180 mg/dL    D50 order 0.0 ml    D50 administered 0.00 ml    Minutes until next BG 60 min    Order initials anp     Administered initials anp    MAGNESIUM    Collection Time: 10/13/18  2:30 AM   Result Value Ref Range    Magnesium 1.5 (L) 1.6 - 2.6 mg/dL   CBC WITH AUTOMATED DIFF    Collection Time: 10/13/18  2:30 AM   Result Value Ref Range    WBC 8.9 4.6 - 13.2 K/uL    RBC 3.58 (L) 4.70 - 5.50 M/uL    HGB 10.2 (L) 13.0 - 16.0 g/dL    HCT 30.9 (L) 36.0 - 48.0 %    MCV 86.3 74.0 - 97.0 FL    MCH 28.5 24.0 - 34.0 PG    MCHC 33.0 31.0 - 37.0 g/dL    RDW 13.4 11.6 - 14.5 %    PLATELET 736 328 - 841 K/uL    MPV 9.6 9.2 - 11.8 FL    NEUTROPHILS 81 (H) 40 - 73 %    LYMPHOCYTES 9 (L) 21 - 52 %    MONOCYTES 9 3 - 10 %    EOSINOPHILS 1 0 - 5 %    BASOPHILS 0 0 - 2 %    ABS. NEUTROPHILS 7.3 1.8 - 8.0 K/UL    ABS. LYMPHOCYTES 0.8 (L) 0.9 - 3.6 K/UL    ABS. MONOCYTES 0.8 0.05 - 1.2 K/UL    ABS. EOSINOPHILS 0.1 0.0 - 0.4 K/UL    ABS.  BASOPHILS 0.0 0.0 - 0.1 K/UL    DF AUTOMATED     METABOLIC PANEL, BASIC    Collection Time: 10/13/18  2:30 AM   Result Value Ref Range    Sodium 141 136 - 145 mmol/L    Potassium 3.3 (L) 3.5 - 5.5 mmol/L    Chloride 108 100 - 108 mmol/L    CO2 26 21 - 32 mmol/L    Anion gap 7 3.0 - 18 mmol/L    Glucose 121 (H) 74 - 99 mg/dL    BUN 7 7.0 - 18 MG/DL    Creatinine 0.68 0.6 - 1.3 MG/DL    BUN/Creatinine ratio 10 (L) 12 - 20      GFR est AA >60 >60 ml/min/1.73m2    GFR est non-AA >60 >60 ml/min/1.73m2    Calcium 8.2 (L) 8.5 - 10.1 MG/DL   GLUCOSE, POC    Collection Time: 10/13/18  2:36 AM   Result Value Ref Range    Glucose (POC) 135 (H) 70 - 110 mg/dL   GLUCOSTABILIZER    Collection Time: 10/13/18  2:38 AM   Result Value Ref Range    Glucose 135 mg/dL    Insulin order 0.0 units/hour    Insulin adminstered 0.0 units/hour    Multiplier 0.000     Low target 140 mg/dL    High target 180 mg/dL    D50 order 0.0 ml    D50 administered 0.00 ml    Minutes until next BG 60 min    Order initials jw     Administered initials jw    GLUCOSE, POC    Collection Time: 10/13/18  3:40 AM   Result Value Ref Range    Glucose (POC) 110 70 - 110 mg/dL   GLUCOSTABILIZER    Collection Time: 10/13/18  3:41 AM   Result Value Ref Range    Glucose 110 mg/dL    Insulin order 0.0 units/hour    Insulin adminstered 0.0 units/hour    Multiplier 0.000     Low target 140 mg/dL    High target 180 mg/dL    D50 order 0.0 ml    D50 administered 0.00 ml    Minutes until next  min    Order initials anp     Administered initials anp    GLUCOSE, POC    Collection Time: 10/13/18  5:42 AM   Result Value Ref Range    Glucose (POC) 91 70 - 110 mg/dL   GLUCOSTABILIZER    Collection Time: 10/13/18  5:43 AM   Result Value Ref Range    Glucose 91 mg/dL    Insulin order 0.0 units/hour    Insulin adminstered 0.0 units/hour    Multiplier 0.000     Low target 140 mg/dL    High target 180 mg/dL    D50 order 0.0 ml    D50 administered 0.00 ml    Minutes until next  min    Order initials mrd     Administered initials mrd    GLUCOSE, POC    Collection Time: 10/13/18  7:46 AM   Result Value Ref Range    Glucose (POC) 89 70 - 110 mg/dL   GLUCOSTABILIZER    Collection Time: 10/13/18  7:49 AM   Result Value Ref Range    Glucose 89 mg/dL    Insulin order 0.0 units/hour    Insulin adminstered 0.0 units/hour    Multiplier 0.000     Low target 140 mg/dL    High target 180 mg/dL    D50 order 0.0 ml    D50 administered 0.00 ml    Minutes until next  min    Order initials sb     Administered initials sb      Foot Exam:  Vascular:   DP/PT pulses palpable. CFT to digits b/l less than three seconds. Skin temp warm to cool from proximal to distal.     Neuro:   epicritic sensation intact    Derm:    Dressing clean dry intact. Prolene sutures coapting amputation site. No erythema or warmth to surrounding foot or leg     Musculoskeltal:   no gross deformities noted.            Impression:   DM type II    S/p right 2nd toe amputation    S/p delayed closure right foot POD#1      Recommendation:   Patient seen this morning on rounds and we spoke with wife Brenda on phone    Continue abx per ID    D/c all wound care    PT ordered:  right heel touch only for pivot    OK from our standpoint for d/c to SNF or home. Wife wants SNF    Follow up 1 week in wound care clinic with us.

## 2018-10-13 NOTE — ROUTINE PROCESS
0820-Assessment completed, call bell within reach, no distress noted, voiced no complaints. 0920-am due medications given. 1230-no change in condition, no distress noted. 1430-patient taken to OR via bed. 1700-returned from OR, dressing D/I, denies any pain. 1730-updated with medications. 1915-Bedside and Verbal shift change report given to Odalys Rivera (oncoming nurse) by Viet Rojas (offgoing nurse). Report included the following information SBAR, MAR and Recent Results.

## 2018-10-13 NOTE — PROGRESS NOTES
Problem: Mobility Impaired (Adult and Pediatric) Goal: *Acute Goals and Plan of Care (Insert Text) Physical Therapy Goals Initiated 10/13/2018 and to be accomplished within 7 day(s) 1. Patient will move from supine to sit and sit to supine , scoot up and down and roll side to side in bed with independence. 2.  Patient will transfer from bed to chair and chair to bed with independence using the least restrictive device. 3.  Patient will perform sit to stand with independence. 4.  Patient will ambulate with independence for >/= 200 feet with the least restrictive device. 5.  Patient will ascend/descend 3 stairs with bilateral handrail(s) with independence. physical Therapy EVALUATION Patient: Cathi Estes (10 y.o. male) Date: 10/13/2018 Primary Diagnosis: Sepsis ([address] Diabetic foot ([address] Sepsis ([address] second toe Diabetic foot ulcer ([address] Sepsis ([address] Septic arthritis of IP joint of toe, right ([address] 
necrotic foot Procedure(s) (LRB): EXTREMITY IRRIGATION AND DEBRIDEMENT AND DELAYED CLOSURE OF FOOT (Right) 1 Day Post-Op Precautions: Fall (wb at R heel for pivot ); heel touch only surgical foot (R) PLOF: Independent gait with rollator, employed. ASSESSMENT : 
 Patient requires between modified independence and contact guard assist for bed mobility, transfers and ambulation. Will benefit from skilled PT intervention to increase overall functional mobility independence and safety post-op Patient presents with deficits in:  
Bed Mobility, Transfers, Gait and Balance Patient will benefit from skilled intervention to address the above impairments. Patients rehabilitation potential is considered to be Good Factors which may influence rehabilitation potential include:  
[x]         None noted 
[]         Mental ability/status []         Medical condition 
[]         Home/family situation and support systems 
[]         Safety awareness 
[]         Pain tolerance/management []         Other:  
 
Recommendations for nursing:  
Written on communication board: May ambulate in hallway with assist of 1 heel WB Right LE Verbally communicated to: nurse [facility] PLAN : 
Recommendations and Planned Interventions: 
[x]           Bed Mobility Training             []    Neuromuscular Re-Education 
[x]           Transfer Training                   []    Orthotic/Prosthetic Training 
[x]           Gait Training                          []    Modalities []           Therapeutic Exercises          []    Edema Management/Control 
[]           Therapeutic Activities            [x]    Patient and Family Training/Education* [x]           Other (comment): Plan of care, transfer and gait with rolling walker Heel weight bearing right LE to promote wound healing. Frequency/Duration: Patient will be followed by physical therapy 1 time per day/3-5 days per week to address goals. Discharge Recommendations: Alicia #5 Rylande Edward Self Final Further Equipment Recommendations for Discharge: N/A  
 
SUBJECTIVE:  
Patient stated I am going to rehab Monday.  OBJECTIVE DATA SUMMARY:  
 
Past Medical History:  
Diagnosis Date  Diabetes ([facility]  Hypercholesteremia  Myocardial infarct ([facility] [address]  Sarcoidosis Past Surgical History:  
Procedure Laterality Date  HX CORONARY STENT PLACEMENT    
 HX HEART CATHETERIZATION Barriers to Learning/Limitations: yes;  none Compensate with: visual, verbal, tactile, kinesthetic cues/model G CODE:Mobility [id number] Current  CL= 60-79%   Goal  CL= 60-79%. The severity rating is based on the Other [facility] Balance Scale Eval Complexity: History: MEDIUM  Complexity : 1-2 comorbidities / personal factors will impact the outcome/ POC Exam:LOW Complexity : 1-2 Standardized tests and measures addressing body structure, function, activity limitation and / or participation in recreation  Presentation: LOW Complexity : Stable, uncomplicated  Clinical Decision Making:Low Complexity [address] Standing Balance Scale Overall Complexity: [address] Standing Balance Scale 
0: Pt performs 25% or less of standing activity (Max assist) CN, 100% impaired. 1: Pt supports self with upper extremities but requires therapist assistance. Pt performs 25-50% of effort (Mod assist) CM, 80% to <100% impaired. 1+: Pt supports self with upper extremities but requires therapist assistance. Pt performs >50% effort. (Min assist). CL, 60% to <80% impaired. 2: Pt supports self independently with both upper extremities (walker, crutches, parallel bars). CL, 60% to <80% impaired. 2+: Pt support self independently with 1 upper extremity (cane, crutch, 1 parallel bar). CK, 40% to <60% impaired. 3: Pt stands without upper extremity support for up to 30 seconds. CK, 40% to <60% impaired. 3+: Pt stands without upper extremity support for 30 seconds or greater. CJ, 20% to <40% impaired. 4: Pt independently moves and returns center of gravity 1-2 inches in one plane. CJ, 20% to <40% impaired. 4+: Pt independently moves and returns center of gravity 1-2 inches in multiple planes. CI, 1% to <20% impaired. 5: Pt independently moves and returns center of gravity in all planes greater than 2 inches. CH, 0% impaired. Prior Level of Function/Home Situation: Independent gait with rollator, employed. Home Situation Home Environment: Private residence # Steps to Enter: 3 Rails to Enter: Yes Hand Rails : Bilateral 
One/Two Story Residence: One story Living Alone: No 
Support Systems: Spouse/Significant Other/Partner, Child(kamron) Patient Expects to be Discharged to[de-identified] Rehabilitation facility Current DME Used/Available at Home: Grab bars, Walker, rolling, Cane, straight Tub or Shower Type: Tub/Shower combination Critical Behavior: 
Neurologic State: Alert Orientation Level: Oriented X4 
 Cognition: Appropriate for age attention/concentration; Follows commands Safety/Judgement: Fall prevention Psychosocial 
Patient Behaviors: Calm; Cooperative Purposeful Interaction: Yes 
  
Strength:   
Strength: Generally decreased, functional (both LE) Tone & Sensation:  
Sensation: Impaired (diabetic neuropathy) Range Of Motion: 
AROM: Within functional limits (both LE) Functional Mobility: 
Bed Mobility: 
Supine to Sit: Modified independent Transfers: 
Sit to Stand: Contact guard assistance Stand to Sit: Stand-by assistance Balance:  
Sitting: Intact Standing: With support Standing - Static: Good Standing - Dynamic : Good Ambulation/Gait Training: 
Distance (ft): 140 Feet (ft) Assistive Device: Walker, rolling Ambulation - Level of Assistance: Stand-by assistance Base of Support: Other (comment) [facility]) Speed/Isa: Pace decreased (<100 feet/min) Step Length: Other (comment) [facility]) Pain: 
Pre treatment pain level:  0 Post treatment pain level: 0 Pain Scale 1: Numeric (0 - 10) Pain Intensity 1: 0 Activity Tolerance:  
Fair Please refer to the flowsheet for vital signs taken during this treatment. After treatment:  
[x]         Patient left in no apparent distress sitting up in chair 
[x]         Patient left in no apparent distress in bed 
[x]         Call bell left within reach 
[]         Nursing notified 
[]         Caregiver present 
[]         Bed alarm activated COMMUNICATION/EDUCATION:*  
[x]         Fall prevention education was provided and the patient/caregiver indicated understanding. [x]         Patient/family have participated as able in goal setting and plan of care. [x]         Patient/family agree to work toward stated goals and plan of care. []         Patient understands intent and goals of therapy, but is neutral about his/her participation. []         Patient is unable to participate in goal setting and plan of care. Thank you for this referral. 
Jeffry Lopez, PT Time Calculation: 25 mins

## 2018-10-14 LAB
ANION GAP SERPL CALC-SCNC: 8 MMOL/L (ref 3–18)
BUN SERPL-MCNC: 5 MG/DL (ref 7–18)
BUN/CREAT SERPL: 7 (ref 12–20)
CALCIUM SERPL-MCNC: 7.3 MG/DL (ref 8.5–10.1)
CHLORIDE SERPL-SCNC: 108 MMOL/L (ref 100–108)
CO2 SERPL-SCNC: 25 MMOL/L (ref 21–32)
CREAT SERPL-MCNC: 0.69 MG/DL (ref 0.6–1.3)
GLUCOSE BLD STRIP.AUTO-MCNC: 211 MG/DL (ref 70–110)
GLUCOSE BLD STRIP.AUTO-MCNC: 257 MG/DL (ref 70–110)
GLUCOSE BLD STRIP.AUTO-MCNC: 295 MG/DL (ref 70–110)
GLUCOSE BLD STRIP.AUTO-MCNC: 298 MG/DL (ref 70–110)
GLUCOSE BLD STRIP.AUTO-MCNC: 335 MG/DL (ref 70–110)
GLUCOSE SERPL-MCNC: 221 MG/DL (ref 74–99)
MAGNESIUM SERPL-MCNC: 1.6 MG/DL (ref 1.6–2.6)
POTASSIUM SERPL-SCNC: 3.5 MMOL/L (ref 3.5–5.5)
SODIUM SERPL-SCNC: 141 MMOL/L (ref 136–145)

## 2018-10-14 PROCEDURE — 74011636637 HC RX REV CODE- 636/637: Performed by: NURSE PRACTITIONER

## 2018-10-14 PROCEDURE — 74011250637 HC RX REV CODE- 250/637: Performed by: INTERNAL MEDICINE

## 2018-10-14 PROCEDURE — 74011250636 HC RX REV CODE- 250/636: Performed by: HOSPITALIST

## 2018-10-14 PROCEDURE — 74011636637 HC RX REV CODE- 636/637: Performed by: INTERNAL MEDICINE

## 2018-10-14 PROCEDURE — 74011250637 HC RX REV CODE- 250/637: Performed by: NURSE PRACTITIONER

## 2018-10-14 PROCEDURE — 74011250636 HC RX REV CODE- 250/636: Performed by: INTERNAL MEDICINE

## 2018-10-14 PROCEDURE — 80048 BASIC METABOLIC PNL TOTAL CA: CPT | Performed by: NURSE PRACTITIONER

## 2018-10-14 PROCEDURE — 65270000029 HC RM PRIVATE

## 2018-10-14 PROCEDURE — 74011250637 HC RX REV CODE- 250/637: Performed by: HOSPITALIST

## 2018-10-14 PROCEDURE — 83735 ASSAY OF MAGNESIUM: CPT | Performed by: NURSE PRACTITIONER

## 2018-10-14 PROCEDURE — 82962 GLUCOSE BLOOD TEST: CPT

## 2018-10-14 RX ORDER — INSULIN GLARGINE 100 [IU]/ML
40 INJECTION, SOLUTION SUBCUTANEOUS DAILY
Status: DISCONTINUED | OUTPATIENT
Start: 2018-10-15 | End: 2018-10-17

## 2018-10-14 RX ADMIN — INSULIN LISPRO 9 UNITS: 100 INJECTION, SOLUTION INTRAVENOUS; SUBCUTANEOUS at 08:28

## 2018-10-14 RX ADMIN — Medication 10 ML: at 06:21

## 2018-10-14 RX ADMIN — Medication 10 ML: at 14:26

## 2018-10-14 RX ADMIN — METOPROLOL TARTRATE 75 MG: 50 TABLET ORAL at 08:29

## 2018-10-14 RX ADMIN — METOPROLOL TARTRATE 75 MG: 50 TABLET ORAL at 21:47

## 2018-10-14 RX ADMIN — PANTOPRAZOLE SODIUM 40 MG: 40 TABLET, DELAYED RELEASE ORAL at 21:48

## 2018-10-14 RX ADMIN — Medication 10 ML: at 21:48

## 2018-10-14 RX ADMIN — VENLAFAXINE HYDROCHLORIDE 75 MG: 37.5 TABLET ORAL at 21:47

## 2018-10-14 RX ADMIN — INSULIN LISPRO 6 UNITS: 100 INJECTION, SOLUTION INTRAVENOUS; SUBCUTANEOUS at 11:30

## 2018-10-14 RX ADMIN — INSULIN GLARGINE 35 UNITS: 100 INJECTION, SOLUTION SUBCUTANEOUS at 08:28

## 2018-10-14 RX ADMIN — INSULIN LISPRO 12 UNITS: 100 INJECTION, SOLUTION INTRAVENOUS; SUBCUTANEOUS at 21:48

## 2018-10-14 RX ADMIN — INSULIN LISPRO 9 UNITS: 100 INJECTION, SOLUTION INTRAVENOUS; SUBCUTANEOUS at 17:38

## 2018-10-14 RX ADMIN — CEFAZOLIN SODIUM 2 G: 2 SOLUTION INTRAVENOUS at 21:47

## 2018-10-14 RX ADMIN — PREDNISONE 10 MG: 5 TABLET ORAL at 08:29

## 2018-10-14 RX ADMIN — CEFAZOLIN SODIUM 2 G: 2 SOLUTION INTRAVENOUS at 14:26

## 2018-10-14 RX ADMIN — HEPARIN SODIUM 5000 UNITS: 5000 INJECTION INTRAVENOUS; SUBCUTANEOUS at 23:30

## 2018-10-14 RX ADMIN — HEPARIN SODIUM 5000 UNITS: 5000 INJECTION INTRAVENOUS; SUBCUTANEOUS at 08:28

## 2018-10-14 RX ADMIN — HEPARIN SODIUM 5000 UNITS: 5000 INJECTION INTRAVENOUS; SUBCUTANEOUS at 14:26

## 2018-10-14 RX ADMIN — Medication 20 ML: at 14:27

## 2018-10-14 RX ADMIN — CEFAZOLIN SODIUM 2 G: 2 SOLUTION INTRAVENOUS at 06:21

## 2018-10-14 NOTE — ROUTINE PROCESS
46- Assumed care. Pt in bed resting. No c/o pain. No respiratory distress noted. Alert and oriented x 4. Call light in reach. 0825- Due meds given. Tolerated well. Bedside and Verbal shift change report given to Sonny Valentine (oncoming nurse) by Rosenda Caro RN 
 (offgoing nurse). Report included the following information SBAR, Kardex, Procedure Summary, Intake/Output, MAR, Recent Results and Med Rec Status.

## 2018-10-14 NOTE — PROGRESS NOTES
Problem: Falls - Risk of 
Goal: *Absence of Falls Document Adelaida Thomas Fall Risk and appropriate interventions in the flowsheet. Outcome: Progressing Towards Goal 
Fall Risk Interventions: 
Mobility Interventions: Patient to call before getting OOB Medication Interventions: Evaluate medications/consider consulting pharmacy Elimination Interventions: Call light in reach Problem: Pressure Injury - Risk of 
Goal: *Prevention of pressure injury Document Rajesh Scale and appropriate interventions in the flowsheet. Outcome: Progressing Towards Goal 
Pressure Injury Interventions: 
Sensory Interventions: Assess changes in LOC Moisture Interventions: Absorbent underpads Activity Interventions: Increase time out of bed Mobility Interventions: Pressure redistribution bed/mattress (bed type) Nutrition Interventions: Document food/fluid/supplement intake

## 2018-10-14 NOTE — PROGRESS NOTES
2000-no sounds of distress. Ordered meds given throughout shift. Evening insulin dose appeared to have little effect on patient's glucose levels during the night. MD made aware. No new orders received. Bedside shift change report given to JUAN A Melendez(oncoming nurse) by Sixto Bhatia (offgoing nurse). Report included the following information SBAR.

## 2018-10-14 NOTE — PROGRESS NOTES
Problem: Falls - Risk of 
Goal: *Absence of Falls Document Odilia Dugan Fall Risk and appropriate interventions in the flowsheet. Outcome: Progressing Towards Goal 
Fall Risk Interventions: 
Mobility Interventions: Patient to call before getting OOB Medication Interventions: Patient to call before getting OOB Elimination Interventions: Call light in reach Problem: Pressure Injury - Risk of 
Goal: *Prevention of pressure injury Document Rajesh Scale and appropriate interventions in the flowsheet. Outcome: Progressing Towards Goal 
Pressure Injury Interventions: 
Sensory Interventions: Assess changes in LOC Moisture Interventions: Absorbent underpads Activity Interventions: Increase time out of bed Mobility Interventions: HOB 30 degrees or less Nutrition Interventions: Document food/fluid/supplement intake

## 2018-10-14 NOTE — ROUTINE PROCESS
0815-Assessment completed, call bell within reach, no distress note, voiced no complaints. 0854-am due medications given. 1230-no change in condition, no distress noted. 1400-resting quietly in bed. 1630-no change in condition, no distress noted, voiced no complaints at this itme. 1809-pm due medications given. 1910-Bedside and Verbal shift change report given to Odalys Rivera (oncoming nurse) by Viet Rojas (offgoing nurse). Report included the following information SBAR, MAR and Recent Results.

## 2018-10-14 NOTE — PROGRESS NOTES
Problem: Falls - Risk of 
Goal: *Absence of Falls Document Lucinda De Los aSntos Fall Risk and appropriate interventions in the flowsheet. Fall Risk Interventions: 
Mobility Interventions: Patient to call before getting OOB, PT Consult for mobility concerns, OT consult for ADLs Medication Interventions: Patient to call before getting OOB Elimination Interventions: Call light in reach Problem: Pressure Injury - Risk of 
Goal: *Prevention of pressure injury Document Rajesh Scale and appropriate interventions in the flowsheet. Outcome: Progressing Towards Goal 
Pressure Injury Interventions: 
Sensory Interventions: Assess changes in LOC Moisture Interventions: Absorbent underpads Activity Interventions: Pressure redistribution bed/mattress(bed type), PT/OT evaluation Mobility Interventions: Pressure redistribution bed/mattress (bed type), PT/OT evaluation Nutrition Interventions: Document food/fluid/supplement intake

## 2018-10-15 LAB
GLUCOSE BLD STRIP.AUTO-MCNC: 184 MG/DL (ref 70–110)
GLUCOSE BLD STRIP.AUTO-MCNC: 201 MG/DL (ref 70–110)
GLUCOSE BLD STRIP.AUTO-MCNC: 237 MG/DL (ref 70–110)
GLUCOSE BLD STRIP.AUTO-MCNC: 408 MG/DL (ref 70–110)
GLUCOSE BLD STRIP.AUTO-MCNC: 420 MG/DL (ref 70–110)

## 2018-10-15 PROCEDURE — 74011250636 HC RX REV CODE- 250/636: Performed by: HOSPITALIST

## 2018-10-15 PROCEDURE — 74011636637 HC RX REV CODE- 636/637: Performed by: HOSPITALIST

## 2018-10-15 PROCEDURE — 74011250637 HC RX REV CODE- 250/637: Performed by: HOSPITALIST

## 2018-10-15 PROCEDURE — 74011636637 HC RX REV CODE- 636/637: Performed by: INTERNAL MEDICINE

## 2018-10-15 PROCEDURE — 65270000029 HC RM PRIVATE

## 2018-10-15 PROCEDURE — 74011250637 HC RX REV CODE- 250/637: Performed by: INTERNAL MEDICINE

## 2018-10-15 PROCEDURE — 82962 GLUCOSE BLOOD TEST: CPT

## 2018-10-15 PROCEDURE — 74011250636 HC RX REV CODE- 250/636: Performed by: INTERNAL MEDICINE

## 2018-10-15 PROCEDURE — 97116 GAIT TRAINING THERAPY: CPT

## 2018-10-15 PROCEDURE — 74011636637 HC RX REV CODE- 636/637: Performed by: NURSE PRACTITIONER

## 2018-10-15 PROCEDURE — 74011250637 HC RX REV CODE- 250/637: Performed by: NURSE PRACTITIONER

## 2018-10-15 PROCEDURE — 74011250636 HC RX REV CODE- 250/636: Performed by: NURSE PRACTITIONER

## 2018-10-15 RX ORDER — MAGNESIUM SULFATE HEPTAHYDRATE 40 MG/ML
2 INJECTION, SOLUTION INTRAVENOUS ONCE
Status: COMPLETED | OUTPATIENT
Start: 2018-10-15 | End: 2018-10-15

## 2018-10-15 RX ORDER — INSULIN LISPRO 100 [IU]/ML
10 INJECTION, SOLUTION INTRAVENOUS; SUBCUTANEOUS ONCE
Status: COMPLETED | OUTPATIENT
Start: 2018-10-15 | End: 2018-10-15

## 2018-10-15 RX ORDER — METOPROLOL TARTRATE 75 MG/1
75 TABLET, FILM COATED ORAL EVERY 12 HOURS
Qty: 60 TAB | Refills: 0 | Status: SHIPPED | OUTPATIENT
Start: 2018-10-15 | End: 2018-11-01

## 2018-10-15 RX ORDER — OXYCODONE AND ACETAMINOPHEN 5; 325 MG/1; MG/1
2 TABLET ORAL
Qty: 36 TAB | Refills: 0 | Status: SHIPPED | OUTPATIENT
Start: 2018-10-15

## 2018-10-15 RX ADMIN — MORPHINE SULFATE 2 MG: 4 INJECTION INTRAVENOUS at 06:01

## 2018-10-15 RX ADMIN — VENLAFAXINE HYDROCHLORIDE 75 MG: 37.5 TABLET ORAL at 21:27

## 2018-10-15 RX ADMIN — METOPROLOL TARTRATE 75 MG: 50 TABLET ORAL at 21:27

## 2018-10-15 RX ADMIN — Medication 10 ML: at 17:30

## 2018-10-15 RX ADMIN — CEFAZOLIN SODIUM 2 G: 2 SOLUTION INTRAVENOUS at 05:50

## 2018-10-15 RX ADMIN — INSULIN LISPRO 6 UNITS: 100 INJECTION, SOLUTION INTRAVENOUS; SUBCUTANEOUS at 17:29

## 2018-10-15 RX ADMIN — CEFAZOLIN SODIUM 2 G: 2 SOLUTION INTRAVENOUS at 14:35

## 2018-10-15 RX ADMIN — HEPARIN SODIUM 5000 UNITS: 5000 INJECTION INTRAVENOUS; SUBCUTANEOUS at 06:02

## 2018-10-15 RX ADMIN — INSULIN LISPRO 10 UNITS: 100 INJECTION, SOLUTION INTRAVENOUS; SUBCUTANEOUS at 23:05

## 2018-10-15 RX ADMIN — INSULIN LISPRO 3 UNITS: 100 INJECTION, SOLUTION INTRAVENOUS; SUBCUTANEOUS at 09:19

## 2018-10-15 RX ADMIN — CEFAZOLIN SODIUM 2 G: 2 SOLUTION INTRAVENOUS at 22:00

## 2018-10-15 RX ADMIN — Medication 10 ML: at 21:27

## 2018-10-15 RX ADMIN — INSULIN LISPRO 6 UNITS: 100 INJECTION, SOLUTION INTRAVENOUS; SUBCUTANEOUS at 12:16

## 2018-10-15 RX ADMIN — HEPARIN SODIUM 5000 UNITS: 5000 INJECTION INTRAVENOUS; SUBCUTANEOUS at 14:35

## 2018-10-15 RX ADMIN — INSULIN GLARGINE 40 UNITS: 100 INJECTION, SOLUTION SUBCUTANEOUS at 09:19

## 2018-10-15 RX ADMIN — MAGNESIUM SULFATE IN WATER 2 G: 40 INJECTION, SOLUTION INTRAVENOUS at 09:13

## 2018-10-15 RX ADMIN — HEPARIN SODIUM 5000 UNITS: 5000 INJECTION INTRAVENOUS; SUBCUTANEOUS at 23:07

## 2018-10-15 RX ADMIN — PANTOPRAZOLE SODIUM 40 MG: 40 TABLET, DELAYED RELEASE ORAL at 21:27

## 2018-10-15 RX ADMIN — METOPROLOL TARTRATE 75 MG: 50 TABLET ORAL at 09:13

## 2018-10-15 RX ADMIN — Medication 10 ML: at 05:50

## 2018-10-15 RX ADMIN — INSULIN LISPRO 15 UNITS: 100 INJECTION, SOLUTION INTRAVENOUS; SUBCUTANEOUS at 21:26

## 2018-10-15 RX ADMIN — PREDNISONE 10 MG: 5 TABLET ORAL at 09:18

## 2018-10-15 NOTE — DISCHARGE SUMMARY
[facility]    Discharge Summary    Patient: Shahrzad Kumar MRN: [id number]  CSN: [id number]    YOB: 1970  Age: 41 y.o. Sex: male    DOA: 10/5/2018 LOS:  LOS: 10 days   Discharge Date:10/15/2018      Admission Diagnoses: Sepsis ([address]  Diabetic foot ([address]  Sepsis ([address]  second toe  Diabetic foot ulcer ([address]  Sepsis ([address]  Septic arthritis of IP joint of toe, right ([address]  necrotic foot    Discharge Diagnoses:    Problem List as of 10/15/2018  Date Reviewed: 10/15/2018          Codes Class Noted - Resolved    Diabetic foot ulcer ([address] ICD-10-CM: E11.621, L97.509  ICD-9-CM: 250.80, 707.15  10/6/2018 - Present        Septic arthritis of IP joint of toe, right ([address] ICD-10-CM: M00.9  ICD-9-CM: 711.07  10/6/2018 - Present        Diabetic foot ([address] ICD-10-CM: E11.8  ICD-9-CM: 250.80  10/5/2018 - Present        Sarcoidosis ICD-10-CM: D86.9  ICD-9-CM: 135  5/13/2016 - Present        DKA, type 1 ([facility] ICD-10-CM: E10.10  ICD-9-CM: 250.13  5/13/2016 - Present        * (Principal)Sepsis ([address] ICD-10-CM: A41.9  ICD-9-CM: 038.9, 995.91  5/13/2016 - Present              Discharge Condition: Stable    Discharge To: SNF    Consults: Hospitalist, Infectious Disease and Podiatry    Hospital Course: 40 y/o [place] American male with hx of DM, HTN, sarcoidosis, afib, HLD, GERD, daily tobacco abuse presented to the ED on 10/5 with sepsis. Pt states he had an infection in his right foot ~one month. He was seen at [facility] and they wanted to admit him for further work up and IV Abx, however pt refused admission. Since then, pt reports the area has slowly gotten worse with increased swelling and drainage. Xray right foot showed deossification along both sides of DIP joint second toe, could represent early osteomyelitis. A1C 9.6% this admission. Pt admitted for further management of care, PCCM consulted, podiatry consulted, pt started on IV Abx.   He is s/p right second toe amputation by Dr. Marguerite Barney on 10/6 with clear margins. Surgical path with acute and chronic osteomyelitis. ID consulted on 10/9. Cultures with MSSA and FBS. PICC placed on 10/11. ID recommends Ancef 2 grams IV TID through 11/18 with weekly labs (CBC w/ diff, BMP and ESR). S/p irrigation and debridement, delayed closure of right foot by Dr. Willene Schaumann on 10/12. Awaiting authorization for [address] Pt is to receive Ancef 2 grams IV TID through 11/18/2018. Please obtain weekly CBC w/ diff, BMP and ESR. PICC is to be discontinued once IV abx completed. Pt is to follow up with PCP within one week, follow up with podiatry within one week in wound care clinic- maintain current dressing until seen by podiatry, follow up with ID within 2 weeks.          Physical Exam:  General appearance: alert, cooperative, no distress, appears stated age  Head: Normocephalic, without obvious abnormality, atraumatic  Lungs: clear to auscultation bilaterally  Heart: regular rate and rhythm, S1, S2 normal, no murmur, click, rub or gallop  Abdomen: soft, non tender, non distended. Normoactive bowel sounds.    Extremities: dressing right foot intact  Skin: Skin color, texture, turgor normal. No rashes or lesions  Neurologic: Grossly normal  PSY: Mood and affect normal, appropriately behaved      Significant Diagnostic Studies: labs:   Recent Results (from the past 24 hour(s))   METABOLIC PANEL, BASIC    Collection Time: 10/14/18 10:55 AM   Result Value Ref Range    Sodium 141 136 - 145 mmol/L    Potassium 3.5 3.5 - 5.5 mmol/L    Chloride 108 100 - 108 mmol/L    CO2 25 21 - 32 mmol/L    Anion gap 8 3.0 - 18 mmol/L    Glucose 221 (H) 74 - 99 mg/dL    BUN 5 (L) 7.0 - 18 MG/DL    Creatinine 0.69 0.6 - 1.3 MG/DL    BUN/Creatinine ratio 7 (L) 12 - 20      GFR est AA >60 >60 ml/min/1.73m2    GFR est non-AA >60 >60 ml/min/1.73m2    Calcium 7.3 (L) 8.5 - 10.1 MG/DL   MAGNESIUM    Collection Time: 10/14/18 10:55 AM   Result Value Ref Range    Magnesium 1.6 1.6 - 2.6 mg/dL GLUCOSE, POC    Collection Time: 10/14/18 11:19 AM   Result Value Ref Range    Glucose (POC) 211 (H) 70 - 110 mg/dL   GLUCOSE, POC    Collection Time: 10/14/18  5:35 PM   Result Value Ref Range    Glucose (POC) 298 (H) 70 - 110 mg/dL   GLUCOSE, POC    Collection Time: 10/14/18  9:04 PM   Result Value Ref Range    Glucose (POC) 335 (H) 70 - 110 mg/dL   GLUCOSE, POC    Collection Time: 10/15/18  7:24 AM   Result Value Ref Range    Glucose (POC) 184 (H) 70 - 110 mg/dL         Discharge Medications:     Current Discharge Medication List      START taking these medications    Details   oxyCODONE-acetaminophen (PERCOCET) 5-325 mg per tablet Take 2 Tabs by mouth every four (4) hours as needed. Max Daily Amount: 12 Tabs. Qty: 36 Tab, Refills: 0    Associated Diagnoses: Acute osteomyelitis ([address]         CONTINUE these medications which have CHANGED    Details   metoprolol tartrate 75 mg tab Take 75 mg by mouth every twelve (12) hours. Qty: 60 Tab, Refills: 0         CONTINUE these medications which have NOT CHANGED    Details   gemfibrozil (LOPID) 600 mg tablet Take 600 mg by mouth two (2) times a day. GABAPENTIN PO Take  by mouth. aspirin (ASPIRIN) 325 mg tablet Take 325 mg by mouth daily. insulin detemir (LEVEMIR) 100 unit/mL injection 0.45 mL by SubCUTAneous route nightly. Qty: 1 Vial, Refills: 1      albuterol (PROVENTIL HFA, VENTOLIN HFA, PROAIR HFA) 90 mcg/actuation inhaler Take 2 Puffs by inhalation every four (4) hours as needed for Wheezing. dextran 70-hypromellose (ARTIFICIAL TEARS) ophthalmic solution Administer 2 Drops to both eyes as needed. glipiZIDE (GLUCOTROL) 10 mg tablet Take 10 mg by mouth two (2) times a day. Indications: TYPE 2 DIABETES MELLITUS      omeprazole (PRILOSEC) 20 mg capsule Take 20 mg by mouth daily. predniSONE (DELTASONE) 10 mg tablet Take 25 mg by mouth daily (with breakfast).  Indications: SARCOIDOSIS      venlafaxine-SR (EFFEXOR-XR) 75 mg capsule Take  by mouth daily. Indications: POST TRAUMATIC STRESS DISORDER      sildenafil citrate (VIAGRA) 100 mg tablet Take 100 mg by mouth as needed. STOP taking these medications       metroNIDAZOLE (FLAGYL) 500 mg tablet Comments:   Reason for Stopping:         ciprofloxacin HCl (CIPRO) 250 mg tablet Comments:   Reason for Stopping:               Activity: Activity as tolerated and PT/OT Eval and Treat- right heel touch only for pivot    Diet: Cardiac Diet and Diabetic Diet    Wound Care: Keep wound clean and dry, maintain current dressing until seen by podiatry in one week in wound care clinic    Follow-up: Follow up with PCP within one week. Follow up with podiatry within one week in wound care clinic. Follow up with ID within 2-3 weeks. Pt is to receive Ancef 2 grams IV TID through 11/18/2018. Please obtain weekly CBC w/ diff, BMP and ESR.       Discharge time: > 35 mins  Germania Garibay NP  10/15/2018, 8:04 AM

## 2018-10-15 NOTE — ROUTINE PROCESS
1510 - Bedside, Verbal and Written shift change report given to Chandler Dumont RN (oncoming nurse) by Enmanuel West RN (offgoing nurse). Report included the following information SBAR, Kardex, Intake/Output, MAR, Recent Results, Med Rec Status, Procedure Summary and Cardiac Rhythm Non-Telemetry. 
   
1530 - Shift assessment completed. Pt alert and oriented x4. No respiratory distress noted. No c/o pain reported. Call bell within reach, bed in low position. Will continue to monitor. 
     
Bedside, Verbal and Written shift change report given to Olga Lidia Walker RN (oncoming nurse) by Chandler Dumont RN (offgoing nurse). Report included the following information SBAR, Kardex, Intake/Output, MAR, Recent Results, Med Rec Status, Procedure Summary and Cardiac Rhythm Non-Telemetry.

## 2018-10-15 NOTE — PROGRESS NOTES
Problem: Falls - Risk of 
Goal: *Absence of Falls Document Yasmeen Heart Fall Risk and appropriate interventions in the flowsheet. Outcome: Progressing Towards Goal 
Fall Risk Interventions: 
Mobility Interventions: Patient to call before getting OOB, Strengthening exercises (ROM-active/passive) Medication Interventions: Evaluate medications/consider consulting pharmacy, Patient to call before getting OOB Elimination Interventions: Patient to call for help with toileting needs, Toileting schedule/hourly rounds, Urinal in reach Problem: Pressure Injury - Risk of 
Goal: *Prevention of pressure injury Document Rajesh Scale and appropriate interventions in the flowsheet. Outcome: Progressing Towards Goal 
Pressure Injury Interventions: 
Sensory Interventions: Assess changes in LOC, Check visual cues for pain, Float heels Moisture Interventions: Absorbent underpads Activity Interventions: Pressure redistribution bed/mattress(bed type) Mobility Interventions: HOB 30 degrees or less, Float heels Nutrition Interventions: Document food/fluid/supplement intake Friction and Shear Interventions: HOB 30 degrees or less

## 2018-10-15 NOTE — PROGRESS NOTES
Podiatry Surgery Progress Note Patient: Cesar Lopez MRN: [id number] LOS: 10 YOB: 1970  Age: 41 y.o. Sex: male Admit Date: 10/5/2018 POD 3 Days Post-Op Procedure:   Procedure(s): EXTREMITY IRRIGATION AND DEBRIDEMENT AND DELAYED CLOSURE OF FOOT Subjective:  
Cesar Lopez is a 40 y/o seen at bedside this afternoon  state over all feeling good and ready for discharge. He has pMHX of DM ll, HTN, sarcoidosis, a fib HLDP and GERD. Patient is  currently resting quiet and no apparent distress. REVIEW OF SYSTEMS: 
General: denies chronic fatigue, weight loss, HEENT: denies ringing in ears, dizzy spells, sinus trouble, hoarseness GI: denies bloating, heartburn, regurgitation, difficulty and or painful swallowing, nausea Lungs: denies cough, SOB, hemoptysis Heart: denies any current chest pain, irregular heart beat Skin: denies rashes, hives, allergic reaction Urinary: denies UTI, kidney stones Bones and Joints: denies back pain, gout, osteoporosis Neurologic: denies headaches, numbness 
   
 
 
Objective:  
 
Vitals Patient Vitals for the past 8 hrs: 
 BP Temp Pulse Resp SpO2  
10/15/18 1507 146/80 97.6 °F (36.4 °C) 65 18 100 % 10/15/18 1149 123/75 97.8 °F (36.6 °C) 67 16 96 % I/O Current Shift 
10/15 0701 - 10/15 1900 In: 600 [P.O.:600] Out: 400 [Urine:400] I/O Last Three Shifts 10/13 1901 - 10/15 0700 In: 2120 [P.O.:1920; I.V.:200] Out: 2750 [Urine:2750] Data Review Recent Results (from the past 24 hour(s)) GLUCOSE, POC Collection Time: 10/14/18  5:35 PM  
Result Value Ref Range Glucose (POC) 298 (H) 70 - 110 mg/dL GLUCOSE, POC Collection Time: 10/14/18  9:04 PM  
Result Value Ref Range Glucose (POC) 335 (H) 70 - 110 mg/dL GLUCOSE, POC Collection Time: 10/15/18  7:24 AM  
Result Value Ref Range Glucose (POC) 184 (H) 70 - 110 mg/dL GLUCOSE, POC Collection Time: 10/15/18 11:15 AM  
Result Value Ref Range Glucose (POC) 237 (H) 70 - 110 mg/dL Physical Exam: 
 
Cesar Lopez  is a 41 y.o. male who is pleasant, alert and oriented x3, in no apparent distress, and looks their given age. Patient is well-developed and nourished, with good attention to hygiene and body habitus. Mood and affect normal, appropriate to situation. Right 2nd toe amp site DSD intact with mild dry bloody drainage. The sutures are intact without gap NO erythema or edema. Vascular Exam:  
Dorsalis Pedis palpable Bilaterally. Skin temp warm to warm. Pedal hair is absent. There are not varicosities present. Neurological Exam:  
Light touch protective sensation is absent to both feet. There is noted Loss of protective sensation. Musculoskeletal Exam: Muscle tone is normal for age. The muscle strength is 5/5 for the flexors, extensors, inverters, and everter's. Dermatological Exam:  
Skin is of abnormal texture and turgor with some atrophic skin changes noting decreased hair growth, nail changes (thickening), pigmentary changes, skin texture (thin,shiney), skin color (rubor, red) . R/L Bilateral. There is diffuse xerosis. Wound Presentation: Wound Foot Right (Active) DRESSING STATUS Clean, dry, and intact 10/14/2018 12:09 AM  
DRESSING TYPE Elastic bandage;4 x 4 10/13/2018  8:30 AM  
SPLINT TYPE/MATERIAL Post-op shoe 10/13/2018  8:30 AM  
Incision site well approximated? Yes 10/11/2018 11:42 PM  
Drainage Amount  None 10/13/2018  8:30 AM  
Drainage Color Serosanguinous; Serous 10/11/2018  6:38 AM  
Wound Odor None 10/11/2018 11:42 PM  
Periwound Skin Condition Edematous 10/11/2018  6:38 AM  
Cleansing and Cleansing Agents  Dermal wound cleanser 10/11/2018 11:42 PM  
Procedure Tolerated Well 10/11/2018 11:42 PM  
Number of days:9 Wound Foot Right (Active) DRESSING STATUS Clean, dry, and intact 10/12/2018  4:19 PM  
DRESSING TYPE 4 x 4;Elastic bandage;Gauze wrap (stephanie); Xeroform 10/12/2018  4:19 PM  
 Number of days:3 Assessment:  
 
Principal Problem: 
  Sepsis ([facility] (5/13/2016) Active Problems: 
  Diabetic foot ([address] (10/5/2018) Diabetic foot ulcer ([address] (10/6/2018) Septic arthritis of IP joint of toe, right ([address] (10/6/2018) Plan/Recommendations/Medical Decision Making:  
 
Discussed with the patient all the above findings. Reviewed overall treatment plan continue wound care and Abx From a Podiatric Surgery point of view good for discharge. Discharge wound care:  Paint the wound with betadine daily and apply DSD. Follow up with Dr Thad Dolan in one week at [facility] wound care center. DSD change with betadine soaked gauze. Dr Reggie Valadez Podiatric Surgeon [facility]) [phone] 
10/15/2018 
3:42 PM

## 2018-10-15 NOTE — ROUTINE PROCESS
Bedside and Verbal shift change report given to Olga LIU RN (oncoming nurse) by Reina Apgar, RN 
 (offgoing nurse). Report included the following information SBAR, Kardex, Intake/Output and MAR.

## 2018-10-15 NOTE — PROGRESS NOTES
Bedside shift report received from Paty 38: AOX4, on room air, resting in bed watching tv; denies any pain at this time; dressing on right foot intact; shift assessment done; no further complaints voiced; educated on blood sugar control, sugar controlled diet 2020: resting in bed, wife at bedside 2147: ; 12 units Humalog given sc for coverage; due meds given 2330: watching tv; no complaints voiced 0140: sleeping; no apparent distress noted 0310: sleeping; no apparent distress noted 
 
0601: Morphine 2 mg Iv given as requested for pain- see flowsheet 
 
0650: sleeping; no visual indications for pain noted 0730: Bedside and Verbal shift change report given to Alok Leiva RN (oncoming nurse) by Omero Soria RN (offgoing nurse). Report included the following information SBAR, Kardex and Recent Results.

## 2018-10-15 NOTE — MANAGEMENT PLAN
Discharge/Transition Planning 
 
6124: Will Johnson at [place] and she will check on [place] auth and get back with CM. Pt accepted to [address] Verified bed with Madelyn Face at [facility]. Pt went 140 feet with therapy, if auth approves will be short SNF stay as he could easily qualify for Home and outpatient follow up. 1140: [facility]. Aware of discharge, but no auth as of yet 1400: Remain waiting on auth 1500: Called Ms Yeung at [place] and remains no [facility]. She has present  pager number as well as phone 
 
[phone]: Remain waiting on auth from [facility] Outcomes Manager Pager # [phone]

## 2018-10-15 NOTE — PROGRESS NOTES
conducted a Follow up consultation and Spiritual Assessment for Delfina Woods, who is a 41 y.o.,male. The  provided the following Interventions: 
Continued the relationship of care and support. Listened empathically. Offered prayer and assurance of continued prayer on patients behalf. Chart reviewed. The following outcomes were achieved: 
Patient expressed gratitude for pastoral care visit. Assessment: 
There are no further spiritual or Advent issues which require Spiritual Care Services interventions at this time. Plan: 
Chaplains will continue to follow and will provide pastoral care on an as needed/requested basis.  recommends bedside caregivers page  on duty if patient shows signs of acute spiritual or emotional distress. [facility] Staff  Spiritual Care  
[phone]

## 2018-10-15 NOTE — ADT AUTH CERT NOTES
10/10 and 10/13  
 
 
  Patient Demographics     
  Patient Name [address] Sex  Address Phone    
  Ann Isaacs [phone] Male 1970 [address] [address] 
[address] [address] [phone] (Mobile) *Preferred*    
   
  CSN:    
  [id number]    
   
  Admit Date: Admit Time Room Bed    
  Oct 5, 2018  7:10 PM 9799 [[id number]] 28 [[id number]]    
   
  Attending Providers     
  Provider Pager From To    
  Ronal Hunt MD  10/05/18 10/06/18    
  Hortencia Santiago, DO  10/06/18 10/06/18    
  Laverne Baltazar MD  10/06/18 10/07/18    
  Hortencia Santiago, DO  10/07/18 10/07/18    
  Jonathan Alvarez MD  10/07/18 10/15/18    
  Yandel Centeno DO  10/15/18     
   
  Emergency Contact(s)     
  Name Relation Home Work Mobile    
Rosalee Spouse [phone]    
   
Utilization Review  
  
  
  Musculoskeletal Surgery or Procedure GRG - Care Day 9 (10/13/2018) by Brandy Peña     
  Review Status Review Entered    
  Completed 10/15/2018    
  Details    
      
  Care Day: 9 Care Date: 10/13/2018 Level of Care: Inpatient Floor    
  Guideline Day 2     
  Level Of Care    
  (X) Floor    
      
  Clinical Status    
  (X) * No ICU or intermediate care needs    
  10/15/2018 3:56 PM EDT by Liliana Kumar    
    VS 98  °F (36.7  °C) 74 bp 129/79  -- At rest r 18 98 %  RA.    
      
      
  Interventions    
  (X) Inpatient interventions continue    
  10/15/2018 3:56 PM EDT by Liliana Kumar    
    Ancef iv q 8h, Heparin 5000 units sc q 8h, insulin sc x 2 ssi, Lantus sc daily,Regular insulin drip ran and was [facility] today,  lopressor po q 12h, protonix po q hs, prednisone po daily 10 mg . effexor po q hs, Mag Sulfate 2 g iv , klor con po x 3,    
      
  ( ) Transition to oral routes    
  10/15/2018 3:56 PM EDT by Liliana Kumar    
    po meds noted above.    
      
      
  10/15/2018 3:56 PM EDT by Liliana Kumar    
  Subject: Additional Clinical Information    
      
   10/12; DATE OF SERVICE: 10/12/2018. ..  SURGEON:   Isai Avendaño DPM....  .PREOPERATIVE DIAGNOSIS:   Open right second toe amputation.  POSTOPERATIVE DIAGNOSIS:   Open right second toe amputation.   ...  PROCEDURE PERFORMED:   Delayed closure of amputation site, right foot.    
      
  10/13; WBC 8.9, HH 10.2/ 30.9, NEUT 81, lymph 9. gluc 221, ca 7. 3.    
      
  Ambulated with PT who recommends HH vs SNF upon DC.    
      
      
      
      
      
  * Milestone    
      
  Additional Notes    
  ------PER MEDICINE    
  A/P:    
  Sepsis    
  - secondary to diabetic foot ulcer    
  - lactic acid now normal    
  - ID following, recommend Ancef TID through 11/8    
  - surgical culture with few streptococci beta hemolytic group B, rare staph aureus    
  - blood cultures collected 10/5 NGTD x 2    
       
  Osteomyelitis of right 2nd digit    
  - podiatry following    
  - s/p amputation right 2nd digit on 10/6    
  - path with acute and chronic OM    
  - surgical culture with few streptococci beta hemolytic Group B, rare staph aureus    
  - blood cultures NGTD x 2    
  - ID recommends Ancef TID through 11/8    
  - s/p irrigation and debridement, delayed closure of right foot by Dr. Olga Lidia Tellez on 10/12    
  - PT eval- right heel touch only for pivot    
       
  Afib    
  - tele monitoring    
  - was on  mg PTA- currently on hold    
       
  Diabetes    
  - A1C 9.6% this admission    
  - was placed on insulin gtt last night 10/12    
  - restart correctional SSI (very insulin resistant), Lantus 35 units daily    
  - d/c insulin gtt @11am today 10/13    
       
  Hx of sarcoidosis    
  - Prednisone 10 mg daily    
       
  Hypertension    
  - Lopressor 75 mg BID    
       
  DVT Prophylaxis    
  - SCD's BLE    
  - Heparin subcutaneously TID .      
  Disposition    
  - plan to d/c to SNF [facility] - awaiting auth.     
      
  -------Per Podiatry;     
   pMHx of DM II, HTN, sarcoidosis, a fib HLDP, and GERD who presents with sepsis to the [facility] Emergency room. He states that he has had an infection in the right foot for approx one month. He states that he was seen at the [facility] and they wanted to admit him for further work up and IV abx, however he refused. He states that since then the area has slowly gotten worse. He admits to having an increase in swelling and drainage from the area. He also admits to a current, every day smoker. He has no other pedal complaints at this time.     
       
  Patient resting today.      
  POD#1 Delayed closure right foot amputation site.     
   Denies F/NS/C/N/V.     
   No pain to surgical site    
  Feels \"great\".    
  DM type II    
       
  S/p right 2nd toe amputation    
       
  S/p delayed closure right foot POD#1    
       
       
  Recommendation:    
  Patient seen this morning on rounds and we spoke with wife Giselle Scott on phone    
       
  Continue abx per ID    
       
  D/c all wound care    
       
  PT ordered:  right heel touch only for pivot.    
       
  OK from our standpoint for d/c to SNF or home.  Wife wants SNF.     
   
  Musculoskeletal Surgery or Procedure GRG - Care Day 6 (10/10/2018) by Renate Latham, JUAN A     
  Review Status Review Entered    
  Completed 10/13/2018    
  Details    
      
  Care Day: 6 Care Date: 10/10/2018 Level of Care: Inpatient Floor    
  Guideline Day 1     
  Clinical Status    
  (X) * Clinical Indications met[E]    
  (X) * Procedure completed    
      
  Interventions    
  (X) Inpatient interventions as needed    
  10/13/2018 6:09 PM EDT by Eliot Ga    
    ANCEF 2G IV X 3.  D50 12.5G IV X 1.  ZOFRAN 4MG IV X 1 .  MAG SULFATE 2G IV X 1    
      
      
      
      
      
  * Milestone    
      
  Additional Notes    
  ID 10/12    
  Sepsis POA- suspect sec to cellulitis and abscess/OM of second toe - clinical features resolved    
   - afebrile now and with normal WBC    
  -      
  Rt DFU involving second toe- OM and ?abscess and cellulitis: MSSA, GBS    
  - Xray reviewed and concerning for second DIP joint infection    
  - ESR 39 and     
  - S/p amputation at MTP joint, Cx BHS, SA    
  - Path s/p acute and chronic OM but was sent from toe itself and not clear margin so positive as expected- cannot ro residual involvement of infected margin - needs PICC- ordered for today    
       
  -ancef 2g iv q 8h through 11/18     
  - weekly lab- cbc/diff, bmp and esr    
  -fu with ID 2 weeks post dc    
       
  - wrote HH orders and discussed with pt    
  -plans for transfer noted.  Will see pt again as out pt in 2 weeks    
  DM2- Uncontrolled- HbA1c of >9 - Needs better control of sugars for better wd healing and prevention of subsequent infections    
  H/o Sarcoidosis- on prednisone - Immunocompromised host    
  - at risk for infection and poor healing on steroids    
  Erythema nodosum of legs    
  - suspect sec to sarcoidosis - LWC    
  - Control of dis    
      
      
      
  OP NOTE    
  Preoperative Diagnosis: open amputation site right foot    
  Postoperative Diagnosis: same     
  Procedure(s):    
  EXTREMITY IRRIGATION AND DEBRIDEMENT AND DELAYED CLOSURE OF right FOOT    
  Estimated Blood Loss: 15 ml    
  Specimens: * No specimens in log *     
  Findings: no purulence, normal 2nd metatarsal head     
  Complications: none    
      
  GLUCOSE 444    
      
  146/85    
  98.4F    
  67HR    
  18RR    
  100%O2    
   
  Musculoskeletal Surgery or Procedure GRG - Clinical Indications for Procedure by Linsey Love RN     
  Review Status Review Entered    
  Completed 10/13/2018    
  Details    
      
  Clinical Indications for Procedure    
  Most Recent : Shaneka Bello Most Recent Date: 10/13/2018 6:04 PM EDT    
  (X) Surgery or other procedure covered by this guideline is indicated for1 or more of the following(1)(2)(3):    
     (X) Wound or soft tissue repair needed; indications may include(12)(13)(14)(15):    
     10/13/2018 6:04 PM EDT by Lulu Fabian Washington right FOOT

## 2018-10-15 NOTE — PROGRESS NOTES
Problem: Mobility Impaired (Adult and Pediatric) Goal: *Acute Goals and Plan of Care (Insert Text) Physical Therapy Goals Initiated 10/13/2018 and to be accomplished within 7 day(s) 1. Patient will move from supine to sit and sit to supine , scoot up and down and roll side to side in bed with independence. 2.  Patient will transfer from bed to chair and chair to bed with independence using the least restrictive device. 3.  Patient will perform sit to stand with independence. 4.  Patient will ambulate with independence for >/= 200 feet with the least restrictive device. 5.  Patient will ascend/descend 3 stairs with bilateral handrail(s) with independence. Outcome: Progressing Towards Goal 
 
PHYSICAL THERAPY: Daily TREATMENT Note INPATIENT: [employer]: Hospital Day: 11 Patient: Armida Ramirez (80 y.o. male)    Date: 10/15/2018 Primary Diagnosis: Sepsis ([address] Diabetic foot ([address] Sepsis ([address] second toe Diabetic foot ulcer ([address] Sepsis ([address] Septic arthritis of IP joint of toe, right ([address] 
necrotic foot Procedure(s) (LRB): EXTREMITY IRRIGATION AND DEBRIDEMENT AND DELAYED CLOSURE OF FOOT (Right), 3 Days Post-Op, Precautions: Fall (wb at R heel for pivot ) Chart, physical therapy assessment, plan of care and goals were reviewed. PLOF:with rollator ASSESSMENT: 
Pt pleasant and cooperative, instructed in safe sit<>stand transfers with RW, gait training 10 ft X2 with RW with cues for sequencing to maintain heel WB on R. Educated pt on importance of compliance with weight bearing restriction and physician instructions. Progression toward goals: 
      Improving appropriately and progressing toward goals PLAN: 
Patient continues to benefit from skilled intervention to address the above impairments. Continue treatment per established plan of care. EDUCATION:  
Education:  Patient was educated on the following topics: weight bearing restriction Barriers to Learning/Limitations: None Compensate with: visual, verbal, tactile, kinesthetic cues/model Discharge Recommendations:  Yrn Munguia Further Equipment Recommendations for Discharge:  rolling walker Factors which may impact discharge planning: none SUBJECTIVE:  
Patient stated what type of socks should I wear? Colette Walls encouraged pt to discuss with podiatry OBJECTIVE DATA SUMMARY:  
Critical Behavior: 
Neurologic State: Alert Orientation Level: Oriented X4 Cognition: Follows commands Safety/Judgement: Fall prevention G CODE:Mobility [id number] Current  CL= 60-79%   Goal  CJ= 20-39%. The severity rating is based on the Other KUSBS [address] Standing Balance Scale 
0: Pt performs 25% or less of standing activity (Max assist) CN, 100% impaired. 1: Pt supports self with upper extremities but requires therapist assistance. Pt performs 25-50% of effort (Mod assist) CM, 80% to <100% impaired. 1+: Pt supports self with upper extremities but requires therapist assistance. Pt performs >50% effort. (Min assist). CL, 60% to <80% impaired. 2: Pt supports self independently with both upper extremities (walker, crutches, parallel bars). CL, 60% to <80% impaired. 2+: Pt support self independently with 1 upper extremity (cane, crutch, 1 parallel bar). CK, 40% to <60% impaired. 3: Pt stands without upper extremity support for up to 30 seconds. CK, 40% to <60% impaired. 3+: Pt stands without upper extremity support for 30 seconds or greater. CJ, 20% to <40% impaired. 4: Pt independently moves and returns center of gravity 1-2 inches in one plane. CJ, 20% to <40% impaired. 4+: Pt independently moves and returns center of gravity 1-2 inches in multiple planes. CI, 1% to <20% impaired. 5: Pt independently moves and returns center of gravity in all planes greater than 2 inches. CH, 0% impaired. Functional Mobility: 
 
 
Functional Status Indep (I) Mod I Super-vision Min A Mod A Max A Total A Assist x2 Verbal cues Additional time Not tested Comments Rolling []  [x]  [] []    []    []  []  [] [] [] [] Supine to sit []  [x]  [] []  []  []  []  [] [] [] [] Sit to supine []  [x]  [] []  []  []  []  [] [] [] [] Sit to stand []  []  [x] []  []  []  []  [] [x] [x] [] Stand to sit []  []  [x] []  []  []  []  [] [x] [x] [] Bed to chair transfers []  []  [x] []  []  []  []  [] [x] [x] [] Balance Good Dave Smith Poor Unable Not tested Comments Sitting static [x]  []  []  []  [] Sitting dynamic [x]  []  []  []  []   
Standing static [x]  []  []  []  []   
Standing dynamic [x]  []  []  []  [] With support Mobility/Gait:  
Level of Assistance: Supervision Assistive Device: rolling walker Distance Ambulated: 10 feet X2 Left Lower Extremity: WBAT Right Lower Extremity: Heel WB only Base of Support: center of gravity altered Speed/Isa: pace decreased (<100 feet/min) Step Length: left shortened and right shortened Swing Pattern: right asymmetrical 
Stance: left increased Gait Abnormalities: early heel rise and step to gait Stairs:  
Level of Assistance: Unable at this time Vital Signs Temp: 98.4 °F (36.9 °C) Pulse (Heart Rate): 69    
BP: 157/90 Resp Rate: 16    
O2 Sat (%): 98 %Pain:Pre treatment pain level:0 Post treatment pain level:0Pain Scale 1: Numeric (0 - 10) Pain Intensity 1: 0 Pain Location 1: Hip;Knee;Back Pain Orientation 1: Left; Lower; Posterior Pain Description 1: Aching Pain Intervention(s) 1: Medication (see MAR) Activity Tolerance:  
Good After treatment:  
 
Patient left in no apparent distress in bed Call bell left within reach Rubén Gonzalez, PTA Time Calculation: 17 mins

## 2018-10-16 LAB
GLUCOSE BLD STRIP.AUTO-MCNC: 155 MG/DL (ref 70–110)
GLUCOSE BLD STRIP.AUTO-MCNC: 254 MG/DL (ref 70–110)
GLUCOSE BLD STRIP.AUTO-MCNC: 317 MG/DL (ref 70–110)
GLUCOSE BLD STRIP.AUTO-MCNC: 327 MG/DL (ref 70–110)
GLUCOSE BLD STRIP.AUTO-MCNC: 367 MG/DL (ref 70–110)
GLUCOSE BLD STRIP.AUTO-MCNC: 390 MG/DL (ref 70–110)

## 2018-10-16 PROCEDURE — 74011250637 HC RX REV CODE- 250/637: Performed by: INTERNAL MEDICINE

## 2018-10-16 PROCEDURE — 74011636637 HC RX REV CODE- 636/637: Performed by: HOSPITALIST

## 2018-10-16 PROCEDURE — 97530 THERAPEUTIC ACTIVITIES: CPT

## 2018-10-16 PROCEDURE — 74011250637 HC RX REV CODE- 250/637: Performed by: HOSPITALIST

## 2018-10-16 PROCEDURE — 74011250636 HC RX REV CODE- 250/636: Performed by: HOSPITALIST

## 2018-10-16 PROCEDURE — 74011636637 HC RX REV CODE- 636/637: Performed by: NURSE PRACTITIONER

## 2018-10-16 PROCEDURE — 65270000029 HC RM PRIVATE

## 2018-10-16 PROCEDURE — 74011250637 HC RX REV CODE- 250/637: Performed by: NURSE PRACTITIONER

## 2018-10-16 PROCEDURE — 74011250636 HC RX REV CODE- 250/636: Performed by: INTERNAL MEDICINE

## 2018-10-16 PROCEDURE — 74011636637 HC RX REV CODE- 636/637: Performed by: INTERNAL MEDICINE

## 2018-10-16 RX ORDER — INSULIN LISPRO 100 [IU]/ML
4 INJECTION, SOLUTION INTRAVENOUS; SUBCUTANEOUS
Status: DISCONTINUED | OUTPATIENT
Start: 2018-10-16 | End: 2018-10-17

## 2018-10-16 RX ADMIN — METOPROLOL TARTRATE 75 MG: 50 TABLET ORAL at 09:18

## 2018-10-16 RX ADMIN — MORPHINE SULFATE 2 MG: 4 INJECTION INTRAVENOUS at 04:40

## 2018-10-16 RX ADMIN — INSULIN LISPRO 4 UNITS: 100 INJECTION, SOLUTION INTRAVENOUS; SUBCUTANEOUS at 17:29

## 2018-10-16 RX ADMIN — Medication 10 ML: at 06:32

## 2018-10-16 RX ADMIN — INSULIN LISPRO 12 UNITS: 100 INJECTION, SOLUTION INTRAVENOUS; SUBCUTANEOUS at 21:55

## 2018-10-16 RX ADMIN — CEFAZOLIN SODIUM 2 G: 2 SOLUTION INTRAVENOUS at 21:55

## 2018-10-16 RX ADMIN — HEPARIN SODIUM 5000 UNITS: 5000 INJECTION INTRAVENOUS; SUBCUTANEOUS at 15:26

## 2018-10-16 RX ADMIN — INSULIN LISPRO 15 UNITS: 100 INJECTION, SOLUTION INTRAVENOUS; SUBCUTANEOUS at 17:27

## 2018-10-16 RX ADMIN — Medication 10 ML: at 21:56

## 2018-10-16 RX ADMIN — PANTOPRAZOLE SODIUM 40 MG: 40 TABLET, DELAYED RELEASE ORAL at 21:54

## 2018-10-16 RX ADMIN — INSULIN GLARGINE 40 UNITS: 100 INJECTION, SOLUTION SUBCUTANEOUS at 09:20

## 2018-10-16 RX ADMIN — VENLAFAXINE HYDROCHLORIDE 75 MG: 37.5 TABLET ORAL at 21:54

## 2018-10-16 RX ADMIN — CEFAZOLIN SODIUM 2 G: 2 SOLUTION INTRAVENOUS at 06:31

## 2018-10-16 RX ADMIN — CEFAZOLIN SODIUM 2 G: 2 SOLUTION INTRAVENOUS at 15:25

## 2018-10-16 RX ADMIN — METOPROLOL TARTRATE 75 MG: 50 TABLET ORAL at 21:54

## 2018-10-16 RX ADMIN — PREDNISONE 10 MG: 5 TABLET ORAL at 09:18

## 2018-10-16 RX ADMIN — INSULIN LISPRO 4 UNITS: 100 INJECTION, SOLUTION INTRAVENOUS; SUBCUTANEOUS at 12:27

## 2018-10-16 RX ADMIN — INSULIN LISPRO 12 UNITS: 100 INJECTION, SOLUTION INTRAVENOUS; SUBCUTANEOUS at 12:26

## 2018-10-16 RX ADMIN — Medication 20 ML: at 15:30

## 2018-10-16 RX ADMIN — HEPARIN SODIUM 5000 UNITS: 5000 INJECTION INTRAVENOUS; SUBCUTANEOUS at 22:00

## 2018-10-16 RX ADMIN — Medication 10 ML: at 15:30

## 2018-10-16 RX ADMIN — HEPARIN SODIUM 5000 UNITS: 5000 INJECTION INTRAVENOUS; SUBCUTANEOUS at 06:31

## 2018-10-16 RX ADMIN — INSULIN LISPRO 2 UNITS: 100 INJECTION, SOLUTION INTRAVENOUS; SUBCUTANEOUS at 09:20

## 2018-10-16 NOTE — PROGRESS NOTES
Bedside shift report received from Jimena [id number] 2000: AOX4, on room air, resting in bed watching tv; denies any pain or other discomforts; shift assessment done; right foot dressing intact, no bleeding noted; shift assessment done; no further compaints voiced; vital signs monitoring done 2126: , repeated= 420; 15 units Humalog given sc; noted to have consumed a big cup of smoothie and had some candies, but said Splenda was used and candies were sugarfree; will recheck BS; advised not to eat further; said he will go to sleep now 2245: ; Dr. Jad Treviño notified; order for additional 10 units Humalog sc obtained and given to pt; will recheck blood sugar 2351: BS rechecked= 367; Dr. Jad Treviño paged and made aware; will recheck BS in 2 hours as ordered 0154: ; no complaints voiced 0440: Morphine 2 mg given IV as requested for pain- see flowsheet 
 
0520: sleeping; no visual indications for pain 
 
0700: resting comfortably; no complaints voiced Bedside and Verbal shift change report given to Axel Garcia  (oncoming nurse) by Claudia Johnson RN (offgoing nurse). Report included the following information SBAR, Kardex, Intake/Output and Recent Results.

## 2018-10-16 NOTE — DISCHARGE SUMMARY
[facility]    Discharge Summary    Patient: Kimberly Chavez MRN: [id number]  CSN: [id number]    YOB: 1970  Age: 41 y.o. Sex: male    DOA: 10/5/2018 LOS:  LOS: 11 days   Discharge Date:10/16/2018      Admission Diagnoses: Sepsis ([address]  Diabetic foot ([address]  Sepsis ([address]  second toe  Diabetic foot ulcer ([address]  Sepsis ([address]  Septic arthritis of IP joint of toe, right ([address]  necrotic foot    Discharge Diagnoses:    Problem List as of 10/16/2018  Date Reviewed: 10/15/2018          Codes Class Noted - Resolved    Diabetic foot ulcer ([address] ICD-10-CM: E11.621, L97.509  ICD-9-CM: 250.80, 707.15  10/6/2018 - Present        Septic arthritis of IP joint of toe, right ([address] ICD-10-CM: M00.9  ICD-9-CM: 711.07  10/6/2018 - Present        Diabetic foot ([address] ICD-10-CM: E11.8  ICD-9-CM: 250.80  10/5/2018 - Present        Sarcoidosis ICD-10-CM: D86.9  ICD-9-CM: 135  5/13/2016 - Present        DKA, type 1 [address] ICD-10-CM: E10.10  ICD-9-CM: 250.13  5/13/2016 - Present        * (Principal)Sepsis ([address] ICD-10-CM: A41.9  ICD-9-CM: 038.9, 995.91  5/13/2016 - Present              Discharge Condition: Stable    Discharge To: SNF    Consults: Hospitalist, Infectious Disease and Podiatry    Hospital Course: 40 y/o [place] American male with hx of DM, HTN, sarcoidosis, afib, HLD, GERD, daily tobacco abuse presented to the ED on 10/5 with sepsis. Pt states he had an infection in his right foot ~one month. He was seen at [facility] and they wanted to admit him for further work up and IV Abx, however pt refused admission. Since then, pt reports the area has slowly gotten worse with increased swelling and drainage. Xray right foot showed deossification along both sides of DIP joint second toe, could represent early osteomyelitis. A1C 9.6% this admission. Pt admitted for further management of care, PCCM consulted, podiatry consulted, pt started on IV Abx.   He is s/p right second toe amputation by Dr. Adrianna Kaminski on 10/6 with clear margins. Surgical path with acute and chronic osteomyelitis. ID consulted on 10/9. Cultures with MSSA and FBS. PICC placed on 10/11. ID recommends Ancef 2 grams IV TID through 11/18 with weekly labs (CBC w/ diff, BMP and ESR). S/p irrigation and debridement, delayed closure of right foot by Dr. Eden Alvarado on 10/12. Awaiting authorization for [facility]. Pt is to receive Ancef 2 grams IV TID through 11/18/2018. Please obtain weekly CBC w/ diff, BMP and ESR. PICC is to be discontinued once IV abx completed. Pt is to follow up with PCP within one week, follow up with podiatry within one week in wound care clinic-DSD change with betadine soaked gauze.          Physical Exam:  General appearance: alert, cooperative, no distress, appears stated age  Head: Normocephalic, without obvious abnormality, atraumatic  Lungs: clear to auscultation bilaterally  Heart: regular rate and rhythm, S1, S2 normal, no murmur, click, rub or gallop  Abdomen: soft, non tender, non distended. Normoactive bowel sounds.    Extremities: dressing right foot intact  Skin: Skin color, texture, turgor normal. No rashes or lesions  Neurologic: Grossly normal  PSY: Mood and affect normal, appropriately behaved      Significant Diagnostic Studies: labs:   Recent Results (from the past 24 hour(s))   GLUCOSE, POC    Collection Time: 10/15/18 11:15 AM   Result Value Ref Range    Glucose (POC) 237 (H) 70 - 110 mg/dL   GLUCOSE, POC    Collection Time: 10/15/18  4:45 PM   Result Value Ref Range    Glucose (POC) 201 (H) 70 - 110 mg/dL   GLUCOSE, POC    Collection Time: 10/15/18  9:10 PM   Result Value Ref Range    Glucose (POC) 420 (HH) 70 - 110 mg/dL   GLUCOSE, POC    Collection Time: 10/15/18 10:45 PM   Result Value Ref Range    Glucose (POC) 408 (HH) 70 - 110 mg/dL   GLUCOSE, POC    Collection Time: 10/15/18 11:55 PM   Result Value Ref Range    Glucose (POC) 367 (H) 70 - 110 mg/dL   GLUCOSE, POC    Collection Time: 10/16/18  1:54 AM   Result Value Ref Range    Glucose (POC) 254 (H) 70 - 110 mg/dL   GLUCOSE, POC    Collection Time: 10/16/18  6:55 AM   Result Value Ref Range    Glucose (POC) 155 (H) 70 - 110 mg/dL         Discharge Medications:     Current Discharge Medication List      START taking these medications    Details   oxyCODONE-acetaminophen (PERCOCET) 5-325 mg per tablet Take 2 Tabs by mouth every four (4) hours as needed. Max Daily Amount: 12 Tabs. Qty: 36 Tab, Refills: 0    Associated Diagnoses: Acute osteomyelitis ([address]         CONTINUE these medications which have CHANGED    Details   metoprolol tartrate 75 mg tab Take 75 mg by mouth every twelve (12) hours. Qty: 60 Tab, Refills: 0         CONTINUE these medications which have NOT CHANGED    Details   gemfibrozil (LOPID) 600 mg tablet Take 600 mg by mouth two (2) times a day. GABAPENTIN PO Take  by mouth. aspirin (ASPIRIN) 325 mg tablet Take 325 mg by mouth daily. insulin detemir (LEVEMIR) 100 unit/mL injection 0.45 mL by SubCUTAneous route nightly. Qty: 1 Vial, Refills: 1      albuterol (PROVENTIL HFA, VENTOLIN HFA, PROAIR HFA) 90 mcg/actuation inhaler Take 2 Puffs by inhalation every four (4) hours as needed for Wheezing. dextran 70-hypromellose (ARTIFICIAL TEARS) ophthalmic solution Administer 2 Drops to both eyes as needed. glipiZIDE (GLUCOTROL) 10 mg tablet Take 10 mg by mouth two (2) times a day. Indications: TYPE 2 DIABETES MELLITUS      omeprazole (PRILOSEC) 20 mg capsule Take 20 mg by mouth daily. predniSONE (DELTASONE) 10 mg tablet Take 25 mg by mouth daily (with breakfast). Indications: SARCOIDOSIS      venlafaxine-SR (EFFEXOR-XR) 75 mg capsule Take  by mouth daily. Indications: POST TRAUMATIC STRESS DISORDER      sildenafil citrate (VIAGRA) 100 mg tablet Take 100 mg by mouth as needed.          STOP taking these medications       metroNIDAZOLE (FLAGYL) 500 mg tablet Comments:   Reason for Stopping:         ciprofloxacin HCl (CIPRO) 250 mg tablet Comments:   Reason for Stopping:               Activity: Activity as tolerated and PT/OT Eval and Treat- right heel touch only for pivot    Diet: Cardiac Diet and Diabetic Diet    Wound Care: DSD change with betadine soaked gauze    Follow-up: Follow up with PCP within one week. Follow up with podiatry within one week in wound care clinic. Follow up with ID within 2-3 weeks. Pt is to receive Ancef 2 grams IV TID through 11/18/2018. Please obtain weekly CBC w/ diff, BMP and ESR.       Discharge time: > 35 mins  Jose Armando Solorio NP  10/16/2018, 07:54 AM

## 2018-10-16 NOTE — PROGRESS NOTES
Spoke to supervisor at [place] she is not showing any requests for SNF in system. She has been unable to reach Ms. Yeung but will send an email. Spoke to patients wife about delay and she is agreeable to send out under primary insurance to [facility]. She prefers [facility]. Jenni Luna will send out referrals and we will notify wife with accepting facilities. She is aware we will need authorization from insurance.

## 2018-10-16 NOTE — ROUTINE PROCESS
6744 Received report from [facility]. Patient alert and oriented. 0930 Patient up sitting in the chair. PT working with Patient. 1130 Patient currently resting in chair, alert and oriented x 4, denies pain or shortness of breath, call bell in reach, 5 Ps and hourly rounding completed. 1500 Patient asking for  and claiming that Authorization is already approved. Informed Hettie Bouche. 1740 Patient resting in the bed. 
 
1920 Bedside and Verbal shift change report given to Yudi Monson RN (oncoming nurse) by Virgil Fontenot RN (offgoing nurse). Report included the following information SBAR, Kardex, Intake/Output, MAR and Recent Results.

## 2018-10-16 NOTE — PROGRESS NOTES
10/6/18 My Director Griselda Eubanks talked with patient's wife today. She is agreeable to posting patient to [address] [place] [address] with [employer]. Posted in [employer]. I called Sanjuana Rivas at [facility]  But she is off. I left message on Rita's phone to call me on my beeper. 2:24 I called again and was told that [facility] [employer] in is filling in for Sanjuana Rob. He is out of office right now and I left Phone message asking him to call me on my beeper. [facility] Bridger Hendrickson, she is out of office this week. I called to Monica Claire but Ms [facility] answered and says I need to talk with Ms [facility]) ext [phone]. I called twice with out getting an answer or voice mail. I went and spoke with Patient, I let him know we are to start Anurag Saravia for rehab. He was not pleased with this but accepted this. I called to his wife Ms Parag Coreas , she did not answer but I left message saying we did [facility] and [facility], but no [facility]. I said we are starting auth with his [employer]. Grace Link. LIZZIE Leary, [facility] [phone], beeper [phone]

## 2018-10-16 NOTE — MANAGEMENT PLAN
Discharge/Transition Planning Remain waiting on auth from [place] for [address] Pt seen ambulating independent in room. Offered HH and pt declined and wants SNF rehab. Cyndy Bowie RN BSN Outcomes Manager Pager # [phone]

## 2018-10-16 NOTE — PROGRESS NOTES
Problem: Mobility Impaired (Adult and Pediatric) Goal: *Acute Goals and Plan of Care (Insert Text) Physical Therapy Goals Initiated 10/13/2018 and to be accomplished within 7 day(s) 1. Patient will move from supine to sit and sit to supine , scoot up and down and roll side to side in bed with independence. 2.  Patient will transfer from bed to chair and chair to bed with independence using the least restrictive device. 3.  Patient will perform sit to stand with independence. 4.  Patient will ambulate with independence for >/= 200 feet with the least restrictive device. 5.  Patient will ascend/descend 3 stairs with bilateral handrail(s) with independence. Outcome: Progressing Towards Goal 
 
PHYSICAL THERAPY: Daily TREATMENT Note INPATIENT: [employer]: Hospital Day: 12 Patient: Cathi Estes (79 y.o. male)    Date: 10/16/2018 Primary Diagnosis: Sepsis ([address] Diabetic foot ([address] Sepsis ([address] second toe Diabetic foot ulcer ([address] Sepsis ([address] Septic arthritis of IP joint of toe, right ([address] 
necrotic foot Procedure(s) (LRB): EXTREMITY IRRIGATION AND DEBRIDEMENT AND DELAYED CLOSURE OF FOOT (Right), 4 Days Post-Op, Precautions: Fall (wb at R heel for pivot ) Chart, physical therapy assessment, plan of care and goals were reviewed. PLOF:mod I with rollator ASSESSMENT: 
Pt with difficulty during transfers today, reporting persistent lightheadedness with sup>sit and sit<>stand transfers; impeding progression of gait training as pt had to return to seated for safety. Pt instructed in breathing techniques which improved tolerance to changes of position, stated during first trial that hands became numb, however this resolved when pt sat and did not return throughout several more sit<>stand trials. Pt educated on strategies to maintain PWB/ heel weight bearing during transfers and gait, demonstrates improved compliance today. Progression toward goals: Improving appropriately and progressing toward goals PLAN: 
Patient continues to benefit from skilled intervention to address the above impairments. Continue treatment per established plan of care. EDUCATION:  
Education:  Patient was educated on the following topics: breathing techniques Barriers to Learning/Limitations: None Compensate with: visual, verbal, tactile, kinesthetic cues/model Discharge Recommendations:  Yrn Munguia Further Equipment Recommendations for Discharge:  TBD at next level of care Factors which may impact discharge planning: none SUBJECTIVE:  
Patient stated I have always gotten light headed, but it lasts longer now.  OBJECTIVE DATA SUMMARY:  
Critical Behavior: 
Neurologic State: Alert, Appropriate for age, Eyes open spontaneously Orientation Level: Oriented X4, Appropriate for age Cognition: Appropriate decision making, Appropriate for age attention/concentration, Appropriate safety awareness, Follows commands Safety/Judgement: Fall prevention G CODE:Mobility [id number] Current  CL= 60-79%   Goal  CJ= 20-39%. The severity rating is based on the Other KUS[facility] [address] Standing Balance Scale 
0: Pt performs 25% or less of standing activity (Max assist) CN, 100% impaired. 1: Pt supports self with upper extremities but requires therapist assistance. Pt performs 25-50% of effort (Mod assist) CM, 80% to <100% impaired. 1+: Pt supports self with upper extremities but requires therapist assistance. Pt performs >50% effort. (Min assist). CL, 60% to <80% impaired. 2: Pt supports self independently with both upper extremities (walker, crutches, parallel bars). CL, 60% to <80% impaired. 2+: Pt support self independently with 1 upper extremity (cane, crutch, 1 parallel bar). CK, 40% to <60% impaired. 3: Pt stands without upper extremity support for up to 30 seconds. CK, 40% to <60% impaired. 3+: Pt stands without upper extremity support for 30 seconds or greater. CJ, 20% to <40% impaired. 4: Pt independently moves and returns center of gravity 1-2 inches in one plane. CJ, 20% to <40% impaired. 4+: Pt independently moves and returns center of gravity 1-2 inches in multiple planes. CI, 1% to <20% impaired. 5: Pt independently moves and returns center of gravity in all planes greater than 2 inches. CH, 0% impaired. Functional Mobility: 
 
 
Functional Status Indep (I) Mod I Super-vision Min A Mod A Max A Total A Assist x2 Verbal cues Additional time Not tested Comments Rolling []  [x]  [] []    []    []  []  [] [] [] [] Supine to sit []  [x]  [] []  []  []  []  [] [] [] [] Sit to supine []  [x]  [] []  []  []  []  [] [] [] [] Sit to stand []  []  [x] []  []  []  []  [] [x] [x] [] Stand to sit []  []  [x] []  []  []  []  [] [x] [x] [] Bed to chair transfers []  []  [x] []  []  []  []  [] [x] [x] [] Balance Good [place] Poor Unable Not tested Comments Sitting static [x]  []  []  []  [] Sitting dynamic [x]  []  []  []  []   
Standing static [x]  []  []  []  []   
Standing dynamic [x]  []  []  []  [] With support Mobility/Gait:  
Level of Assistance: Supervision Assistive Device: rolling walker Distance Ambulated: 10 feet X2 Left Lower Extremity: WBAT Right Lower Extremity: PWB Base of Support: center of gravity altered Speed/Isa: slow Step Length: left shortened and right shortened Swing Pattern: right asymmetrical 
Stance: right increased Gait Abnormalities: decreased step clearance and step to gait Stairs:  
Level of Assistance: Unable at this time Vital Signs Temp: 98 °F (36.7 °C) Pulse (Heart Rate): 71 BP: 125/80 Resp Rate: 16    
O2 Sat (%): 100 %Pain:Pre treatment pain level:0 Post treatment pain level:0Pain Scale 1: Visual 
Pain Intensity 1: 0 Pain Location 1: Back;Foot; Hip Pain Orientation 1: Posterior;Right Pain Description 1: Aching Pain Intervention(s) 1: Medication (see MAR) Activity Tolerance:  
Fair After treatment:  
 
Patient left in no apparent distress in bed Call bell left within reach Liseth Paige PTA Time Calculation: 40 mins

## 2018-10-17 VITALS
BODY MASS INDEX: 23.26 KG/M2 | HEART RATE: 62 BPM | OXYGEN SATURATION: 98 % | RESPIRATION RATE: 18 BRPM | SYSTOLIC BLOOD PRESSURE: 132 MMHG | WEIGHT: 181.22 LBS | TEMPERATURE: 98.3 F | HEIGHT: 74 IN | DIASTOLIC BLOOD PRESSURE: 80 MMHG

## 2018-10-17 LAB
GLUCOSE BLD STRIP.AUTO-MCNC: 194 MG/DL (ref 70–110)
GLUCOSE BLD STRIP.AUTO-MCNC: 310 MG/DL (ref 70–110)
GLUCOSE BLD STRIP.AUTO-MCNC: 313 MG/DL (ref 70–110)

## 2018-10-17 PROCEDURE — 74011250637 HC RX REV CODE- 250/637: Performed by: NURSE PRACTITIONER

## 2018-10-17 PROCEDURE — 74011636637 HC RX REV CODE- 636/637: Performed by: HOSPITALIST

## 2018-10-17 PROCEDURE — 74011636637 HC RX REV CODE- 636/637: Performed by: INTERNAL MEDICINE

## 2018-10-17 PROCEDURE — 74011250636 HC RX REV CODE- 250/636: Performed by: INTERNAL MEDICINE

## 2018-10-17 PROCEDURE — 74011636637 HC RX REV CODE- 636/637: Performed by: NURSE PRACTITIONER

## 2018-10-17 PROCEDURE — 74011250636 HC RX REV CODE- 250/636: Performed by: HOSPITALIST

## 2018-10-17 PROCEDURE — 82962 GLUCOSE BLOOD TEST: CPT

## 2018-10-17 RX ORDER — INSULIN GLARGINE 100 [IU]/ML
50 INJECTION, SOLUTION SUBCUTANEOUS DAILY
Status: DISCONTINUED | OUTPATIENT
Start: 2018-10-18 | End: 2018-10-17 | Stop reason: HOSPADM

## 2018-10-17 RX ORDER — INSULIN LISPRO 100 [IU]/ML
7 INJECTION, SOLUTION INTRAVENOUS; SUBCUTANEOUS
Status: DISCONTINUED | OUTPATIENT
Start: 2018-10-17 | End: 2018-10-17 | Stop reason: HOSPADM

## 2018-10-17 RX ADMIN — INSULIN LISPRO 3 UNITS: 100 INJECTION, SOLUTION INTRAVENOUS; SUBCUTANEOUS at 12:58

## 2018-10-17 RX ADMIN — HYDRALAZINE HYDROCHLORIDE 10 MG: 20 INJECTION INTRAMUSCULAR; INTRAVENOUS at 16:15

## 2018-10-17 RX ADMIN — INSULIN LISPRO 12 UNITS: 100 INJECTION, SOLUTION INTRAVENOUS; SUBCUTANEOUS at 08:59

## 2018-10-17 RX ADMIN — INSULIN LISPRO 7 UNITS: 100 INJECTION, SOLUTION INTRAVENOUS; SUBCUTANEOUS at 12:58

## 2018-10-17 RX ADMIN — HEPARIN SODIUM 5000 UNITS: 5000 INJECTION INTRAVENOUS; SUBCUTANEOUS at 06:51

## 2018-10-17 RX ADMIN — HEPARIN SODIUM 5000 UNITS: 5000 INJECTION INTRAVENOUS; SUBCUTANEOUS at 16:16

## 2018-10-17 RX ADMIN — INSULIN LISPRO 4 UNITS: 100 INJECTION, SOLUTION INTRAVENOUS; SUBCUTANEOUS at 08:59

## 2018-10-17 RX ADMIN — CEFAZOLIN SODIUM 2 G: 2 SOLUTION INTRAVENOUS at 06:50

## 2018-10-17 RX ADMIN — Medication 10 ML: at 13:58

## 2018-10-17 RX ADMIN — INSULIN GLARGINE 40 UNITS: 100 INJECTION, SOLUTION SUBCUTANEOUS at 08:59

## 2018-10-17 RX ADMIN — CEFAZOLIN SODIUM 2 G: 2 SOLUTION INTRAVENOUS at 13:48

## 2018-10-17 RX ADMIN — Medication 10 ML: at 06:50

## 2018-10-17 RX ADMIN — METOPROLOL TARTRATE 75 MG: 50 TABLET ORAL at 08:58

## 2018-10-17 RX ADMIN — INSULIN LISPRO 12 UNITS: 100 INJECTION, SOLUTION INTRAVENOUS; SUBCUTANEOUS at 17:56

## 2018-10-17 RX ADMIN — Medication 20 ML: at 16:16

## 2018-10-17 RX ADMIN — PREDNISONE 10 MG: 5 TABLET ORAL at 08:58

## 2018-10-17 RX ADMIN — INSULIN LISPRO 7 UNITS: 100 INJECTION, SOLUTION INTRAVENOUS; SUBCUTANEOUS at 17:56

## 2018-10-17 NOTE — PROGRESS NOTES
Bedside shift report received from [address] [place] [address] Drive: [facility], on room air, resting in bed; appears comfortable, denies any pain; dressing on right foot dry and intact; shift assessment done 2155: ; 12 units Humalog given sc; due meds given 2230: sleeping; no apparent distress noted 0030: sleeping; no apparent distress noted 0230: sleeping; awake at intervals; no needs voiced 0500: sleeping; urinal within reach 
 
0630: awake, no complaints voiced 0725: Bedside and Verbal shift change report given to Tarik Phillips (oncoming nurse) by Alicia Cordon RN (offgoing nurse). Report included the following information SBAR, Kardex, Intake/Output and Recent Results.

## 2018-10-17 NOTE — PROGRESS NOTES
Called [address] and spoke to Yasir covering admissions today. She asked if I had spoken to Corwin Bonilla at the [facility] yesterday as she stated they were waiting for clinical. Informed her that all clinical had been sent to [facility] and can no longer wait for [facility] to process [facility] Informed her that CM Director has given directive to obtain auth from [facility]. Per Chin Doll was started with [facility] yesterday afternoon. SNF auth pending at this time. [phone]--Called Corwin Bonilla, supervisor [facility] ext [phone]. No answer, no VM available. Emailed Ms Mullen. Additional clinical/PT note from 10/16 sent to [facility] to include for SNF auth. [phone]--Received call from pt's spouse Ms Shasha Ruiz who indicated she just spoke to Kait Hamm at the [facility] who indicated she will assist with the [place] if info re-faxed to [phone]. Placed call to Ms Bladimir De Leon [phone] ext . Informed her that had just spoken to pt's wife, requesting we fax the info to her. Voiced frustration with the [facility] auth process as this info was already faxed on 10/12. Pt has been ready for discharge and requested urgent review with auth today. She indicated that she will receive the fax in an email and will then forward to Emmy Mcnair and the pt's [facility] PCP for approval. She did indicate that she couldn't guarantee an [facility] today. [place] was on this call.

## 2018-10-17 NOTE — PROGRESS NOTES
Attempted PT, pt declined citing 8/10 pain, states nursing is aware. Will continue to follow.  Mana Vicente, PTA

## 2018-10-17 NOTE — PROGRESS NOTES
Updated packet faxed to Marcella Lulu at the [place]  [phone] as requested 0911--Checking with the [place] regarding SNF auth and also if they will be providing auth for transportation as this writer shared that it is the expectation of the patient and spouse that the [place] will be covering this. Roly Armando indicated that she was sending a group text message to all necessary parties regarding the Auth and transport and for someone to call this writer back. 1500--Received a call from Ginna Dixon supervisor over contracted placements at the [place]. She is requesting the DC summary be faxed directly to her at [phone]. Informed her that a 43 page fax was sent this morning. She would still like it. Faxed and confirmed by call that they got it. 36-- [facility] indicates that [place] has approved patient to transfer to them and the [place] has set up transport for pick-up of 5:00pm. Medical info/DC Summary and scripts placed in transfer envelope and at nurses station. Updated Corinna Contreras the nurse. Made unit secretary aware of p/u time of 5:00pm. Made spouse aware who will be speaking to the patient. Rosa Tolentino RN Outcomes Manager 
[phone]

## 2018-10-17 NOTE — PROGRESS NOTES
0730:  Bedside and Verbal shift change report given to Deric Reyez RN (oncoming nurse) by Myrna Knight RN (offgoing nurse). Report included the following information SBAR, Kardex, MAR and Recent Results. Patient awake, AOX4, non telemetry, IV infusing KVO, patient receiving antibiotics. Patient voiced no concerns at this time. Bed in lowest/locked position, call bell within reach. 8688: Administered due medications, patient tolerated well, voiced no concerns. 0945:  Patient sitting up in bed eating breakfast while watching tv, no s/s of pain/distress noted. 1130:  Patient resting comfortably, no concerns at this time. 1258: Administered due medications, patient tolerated well 1348:  Scheduled antibiotics started. 1411:  Notified by CNA of elevated BP, will administer PRN hydralazine. 1615:  IV hydralazine given, patient tolerated well. Talking on phone with wife. 1622:  Patient to be D/C to [place], [place] to arrange transport, patient informed. 1658:  Report called to [facility] to [facility] 1756:  Administered due medications, patient tolerated well.  
 
1909:  Call placed to [place] regarding p/u status. Informed transport is running late and there is no estimated p/u time. 1950:  Bedside and Verbal shift change report given to JUAN A Wesley (oncoming nurse) by Deric Reyez RN (offgoing nurse). Report included the following information SBAR, Kardex, MAR and Recent Results.

## 2018-10-18 NOTE — PROGRESS NOTES
1950  Bedside report received from Gurdeep Jj RN. Pt in bed reting. 1955  Ambulated to bathroom. Shift assessment completed. Denies pain. Still waiting for pick-up by transport company. 2045  Discharged.

## 2018-10-22 NOTE — ADT AUTH CERT NOTES
Musculoskeletal Surgery or Procedure GRG - Care Day 9 (10/13/2018) by Good Anand Review Status Review Entered Completed 10/15/2018 15:56 Criteria Review Care Day: 9 Care Date: 10/13/2018 Level of Care: Inpatient Floor Guideline Day 2 Level Of Care (X) Floor Clinical Status  
(X) * No ICU or intermediate care needs 10/15/2018 3:56 PM EDT by Tom Martinez  
  VS 98  °F (36.7  °C) 74 bp 129/79  -- At rest r 18 98 %  RA. Interventions (X) Inpatient interventions continue 10/15/2018 3:56 PM EDT by Tom Martinez  
  Ancef iv q 8h, Heparin 5000 units sc q 8h, insulin sc x 2 ssi, Lantus sc daily,Regular insulin drip ran and was [facility] today,  lopressor po q 12h, protonix po q hs, prednisone po daily 10 mg . effexor po q hs, Mag Sulfate 2 g iv , klor con po x 3,  
  
( ) Transition to oral routes 10/15/2018 3:56 PM EDT by Tom Martinez  
  po meds noted above. 10/15/2018 3:56 PM EDT by Tom Martinez Subject: Additional Clinical Information 10/12; DATE OF SERVICE: 10/12/2018. ..  SURGEON:   Sade Onofre DPM....  .PREOPERATIVE DIAGNOSIS:   Open right second toe amputation.  POSTOPERATIVE DIAGNOSIS:   Open right second toe amputation.   ...  PROCEDURE PERFORMED:   Delayed closure of amputation site, right foot. 10/13; WBC 8.9, HH 10.2/ 30.9, NEUT 81, lymph 9. gluc 221, ca 7.3. Ambulated with PT who recommends HH vs SNF upon DC.  
  
  
  
  
  
* Milestone Additional Notes  
------PER MEDICINE  
A/P:  
Sepsis  
- secondary to diabetic foot ulcer  
- lactic acid now normal  
- ID following, recommend Ancef TID through 11/8  
- surgical culture with few streptococci beta hemolytic group B, rare staph aureus  
- blood cultures collected 10/5 NGTD x 2  
   
Osteomyelitis of right 2nd digit - podiatry following - s/p amputation right 2nd digit on 10/6  
- path with acute and chronic OM  
 - surgical culture with few streptococci beta hemolytic Group B, rare staph aureus  
- blood cultures NGTD x 2  
- ID recommends Ancef TID through 11/8  
- s/p irrigation and debridement, delayed closure of right foot by Dr. Shanika Mondragon on 10/12  
- PT eval- right heel touch only for pivot  
   
Afib- tele monitoring  
- was on  mg PTA- currently on hold  
   
Diabetes- A1C 9.6% this admission  
- was placed on insulin gtt last night 10/12  
- restart correctional SSI (very insulin resistant), Lantus 35 units daily  
- d/c insulin gtt @11am today 10/13  
   
Hx of sarcoidosis- Prednisone 10 mg daily  
   
Hypertension- Lopressor 75 mg BID  
   
DVT Prophylaxis- SCD's BLE  
- Heparin subcutaneously TID .   
Disposition - plan to d/c to SNF [address] - awaiting auth.  
  
-------Per Podiatry;   
pMHx of DM II, HTN, sarcoidosis, a fib HLDP, and GERD who presents with sepsis to the [facility] Emergency room. He states that he has had an infection in the right foot for approx one month. He states that he was seen at the [facility] and they wanted to admit him for further work up and IV abx, however he refused. He states that since then the area has slowly gotten worse. He admits to having an increase in swelling and drainage from the area. He also admits to a current, every day smoker. He has no other pedal complaints at this time.   
   
Patient resting today.    
POD#1 Delayed closure right foot amputation site.   
 Denies F/NS/C/N/V.   
 No pain to surgical site Feels \"great\". DM type II  
   
S/p right 2nd toe amputation  
   
S/p delayed closure right foot POD#1  
   
   
Recommendation:  
Patient seen this morning on rounds and we spoke with wife Sotero Lambert on phone  
   
Continue abx per ID  
   
D/c all wound care  
   
PT ordered:  right heel touch only for pivot.  
   
OK from our standpoint for d/c to SNF or home.  Wife wants SNF.

## 2018-10-23 NOTE — ADT AUTH CERT NOTES
Clinical 10/15 by Blaire العراقي Review Status Review Entered In Primary 10/23/2018 12:13 Criteria Review  
------[facility]. Waiting on [facility] for SNF placement. Aware of discharge Being planned for Today, but no auth as of yet. 
  
------PER MD team;  
Awaiting authorization for Miladis Valentine is to receive Ancef 2 grams IV TID through 11/18/2018.  Please obtain weekly CBC w/ diff, BMP and ESR.  PICC is to be discontinued once IV abx completed.  Pt is to follow up with PCP within one week, follow up with podiatry within one week in wound care clinic- maintain current dressing until seen by podiatry, follow up with ID within 2 weeks. 
  
Admission Diagnoses: Sepsis ([address] Diabetic foot ([address] Sepsis ([address] second toe Diabetic foot ulcer ([address] Sepsis ([address] Septic arthritis of IP joint of toe, right (HCC) 
necrotic foot.  
  
--------------PER PODIATRY 
POD 3 Days Post-Op Procedure:   Procedure(s): EXTREMITY IRRIGATION AND DEBRIDEMENT AND DELAYED CLOSURE OF FOOT   
  
Subjective:  
Duane C Williams is a 46 y/o seen at bedside this afternoon  state over all feeling good and ready for discharge.  He has pMHX of DM ll, HTN, sarcoidosis, a fib HLDP and GERD. Patient is  currently resting quiet and no apparent distress. 
  
  
-----------PER PT;  instructed in safe sit<>stand transfers with RW, gait training 10 ft X2 with RW with cues for sequencing to maintain heel WB on R. Educated pt on importance of compliance with weight bearing restriction and physician instructions.   
  
Progression toward goals: 
      Improving appropriately and progressing toward goals Additional Notes  
vs 98.1-75-18, bp 142/95, sat 100 ra. ( mag was 1.6 on 10/14) meds; HUMALOG 10 units sc , mag sulfate 2 g iv , ancef 2 g iv q 8h, hep sq q 8h, lantus 40 u sc, humalog sc x 4 today, lopressor po q 12h, morphine iv x 1, prednisone 10 mg po . Hosp course;   
  45 y/o [facility] male with hx of DM, HTN, sarcoidosis, afib, HLD, GERD, daily tobacco abuse presented to the ED on 10/5 with sepsis.  Pt states he had an infection in his right foot ~one month. Arsenio Bryson was seen at [facility] and they wanted to admit him for further work up and IV Abx, however pt refused admission. Vidhya Laughlin then, pt reports the area has slowly gotten worse with increased swelling and drainage.  Xray right foot showed deossification along both sides of DIP joint second toe, could represent early osteomyelitis.  A1C 9.6% this admission.  Pt admitted for further management of care, PCCM consulted, podiatry consulted, pt started on Caden China is s/p right second toe amputation by Dr. Joey Martel on 10/6 with clear margins.  Surgical path with acute and chronic osteomyelitis.  ID consulted on 10/9.  Cultures with MSSA and FBS.  PICC placed on 10/11.  ID recommends Ancef 2 grams IV TID through 11/18 with weekly labs (CBC w/ diff, BMP and ESR).  S/p irrigation and debridement, delayed closure of right foot by Dr. Teodoro Pena on 10/12. Yyaa Velzi authorization for [address]  Pt is to receive Ancef 2 grams IV TID through 11/18/2018. Rebekah Singleton obtain weekly CBC w/ diff, BMP and ESR.  PICC is to be discontinued once IV abx completed.  Pt is to follow up with PCP within one week, follow up with podiatry within one week in wound care clinic- maintain current dressing until seen by podiatry, follow up with ID within 2 weeks. clinical 10/14 by John Jansen Review Status Review Entered In Primary 10/23/2018 12:06 Criteria Review 10/14 per MEDICINE; A/P: 
Sepsis 
- secondary to diabetic foot ulcer 
- lactic acid now normal 
- ID following, recommend Ancef TID through 11/8 
- surgical culture with few streptococci beta hemolytic group B, rare staph aureus 
- blood cultures collected 10/5 NGTD x 2 
  
Osteomyelitis of right 2nd digit - podiatry following - s/p amputation right 2nd digit on 10/6 
- path with acute and chronic OM 
- surgical culture with few streptococci beta hemolytic Group B, rare staph aureus 
- blood cultures NGTD x 2 
- ID recommends Ancef TID through 11/8 
- s/p irrigation and debridement, delayed closure of right foot by Dr. Janice Tirado on 10/12 
- PT eval- right heel touch only for pivot 
  
Afib 
- tele monitoring 
- was on  mg PTA- currently on hold 
  
Diabetes - A1C 9.6% this admission 
- was placed on insulin gtt last night 10/12 
- restart correctional SSI (very insulin resistant), Lantus 35 units daily 
- d/c insulin gtt @11am today 10/13 
  
Hx of sarcoidosis - Prednisone 10 mg daily 
  
Hypertension - Lopressor 75 mg BID 
  
DVT Prophylaxis - SCD's BLE 
- Heparin subcutaneously TID  
  
Disposition - plan to d/c to [facility] [address] - awaiting auth 
   
Additional Notes 97.8-74-16, 142/92, sat 100 RA  
  
LAB; GLUC 221, ca 7.3. MEDS; Ancef iv q 8h, hep 5000 units sc q 8h, lantus sc 35 units, SSI sc x 4,lopressor 75 mg po q 12h, prednisone 10 mg po daily, effexor po q hs 75 mg. Oct 12 review by Krysta Beck Review Status Review Entered In Primary 10/18/2018 10:08 Criteria Review  
-----vs-98.1-67-18, 148/91, sat 100 RA. 
  
-----wbc 10.4, ANC 8.8. Glucose 444*. 
  
-----meds; Mag sulf 2 g iv , KCL po 40 meq, Ancef iv q 8h, bpuivacaine solution iv, D 50- 12.5 g iv , Lantus 25 u sc q hs, SSI sc x 2, lopressor po q 12h, zofran 4 mg iv ,protonix po, prednisone po 10 mg daily, effexor po q hs. 
  
---PODIATRY OCT 12;  
Date of Procedure: 10/12/2018 Preoperative Diagnosis: open amputation site right foot Postoperative Diagnosis: same  
Procedure(s): EXTREMITY IRRIGATION AND DEBRIDEMENT AND DELAYED CLOSURE OF right FOOT 
  
  
Oct 12 per ID;  
Sepsis POA- suspect sec to cellulitis and abscess/OM of second toe - clinical features resolved 
- afebrile now and with normal WBC -    
 Rt DFU involving second toe- OM and ?abscess and cellulitis: MSSA, GBS 
- Xray reviewed and concerning for second DIP joint infection - ESR 39 and  - S/p amputation at MTP joint, Cx BHS, SA 
- Path s/p acute and chronic OM but was sent from toe itself and not clear margin so positive as expected- cannot ro residual involvement of infected margin - needs PICC- ordered for today 
  
-ancef 2g iv q 8h through 11/18  
- weekly lab- cbc/diff, bmp and esr 
-fu with ID 2 weeks post dc 
  
- wrote HH orders and discussed with pt 
-plans for transfer noted. Will see pt again as out pt in 2 weeks 
   
DM2- Uncontrolled- HbA1c of >9 - Needs better control of sugars for better wd healing and prevention of subsequent infections H/o Sarcoidosis- on prednisone - Immunocompromised host 
- at risk for infection and poor healing on steroids Erythema nodosum of legs 
- suspect sec to sarcoidosis - LWC 
- Control of dis  
  
PER MEDICINE 10/12- 
hx of DM, HTN, sarcoidosis, afib, HLD, GERD, daily tobacco abuse presented to the ED on 10/5 with sepsis.  Pt states he had an infection in his right foot ~one month. Abner Avendaño was seen at [facility] and they wanted to admit him for further work up and IV Abx, however pt refused admission. Diogenes Angel then, pt reports the area has slowly gotten worse with increased swelling and drainage.  Xray right foot showed deossification along both sides of DIP joint second toe, could represent early osteomyelitis.  A1C 9.6% this admission.  Pt admitted for further management of care, PCCM consulted, podiatry consulted, pt started on Orvan Favors is s/p right second toe amputation by Dr. Margarita Dodd on 10/6 with clear margins.  Surgical path with acute and chronic osteomyelitis.  ID consulted on 10/9.  Cultures with MSSA and FBS.  PICC placed on 10/11.  ID recommends Ancef 2 grams IV TID through 11/18 with weekly labs (CBC w/ diff, BMP and ESR).   Per podiatry, if continues to improve will consider wound VAC placement.    
  
Sepsis 
- secondary to diabetic foot ulcer 
- lactic acid now normal 
- ID following, recommend Ancef TID through 11/8 
- surgical culture with few streptococci beta hemolytic group B, rare staph aureus 
- blood cultures collected 10/5 NGTD x 2 
  
Osteomyelitis of right 2nd digit - podiatry following - s/p amputation right 2nd digit on 10/6 
- path with acute and chronic OM 
- surgical culture with few streptococci beta hemolytic Group B, rare staph aureus 
- blood cultures NGTD x 2 
- ID recommends Ancef TID through 11/8 
- for irrigation, debridement and delayed closure today with Dr. Xander Urrutia 
  
Afib 
- tele monitoring 
- was on  mg PTA- currently on hold 
  
Diabetes - A1C 9.6% this admission 
- monitor accuchecks, correctional SSI, Humalog 3 units TID, Lantus 27 units nightly  
  
Hx of sarcoidosis - Prednisone 10 mg daily 
  
Hypertension - Lopressor 75 mg BID 
  
DVT Prophylaxis - SCD's BLE 
  
Disposition - plan to d/c to SNF [address] 
  
   
Additional Notes Dc planning per CM; [facility] and [facility] Horacio Mcgee who handles the SNF rehab for [facility] patient. She has pt in system and he is active and can use SNF benefit. She is in agreement with referral to [address] Musculoskeletal Surgery or Procedure GRG - Care Day 12 (10/16/2018) by Cal Angeles Review Status Review Entered Completed 10/16/2018 16:59 Criteria Review Care Day: 12 Care Date: 10/16/2018 Level of Care: Inpatient Floor Guideline Day 3 Level Of Care ( ) * Activity level acceptable 10/16/2018 4:59 PM EDT by Fidencio Odell reporting persistent lightheadedness with sup>sit and sitstand transfers; impeding progression of gait training ( ) * Complete discharge planning Clinical Status  
(X) * Operative site and other wounds acceptable  
(X) * Temperature status acceptable 10/16/2018 4:59 PM EDT by Melisa Barrios  
  98.6  °F (37  °C) 69 bp 104/74  -- -R- 18 sat 99 % ra  
  
(X) * No infection, or status acceptable 10/16/2018 4:59 PM EDT by Fidencio Odell Pt is to receive Ancef 2 grams IV TID through 11/18/2018. PICC line  
  
(X) * No blood loss, or problem resolved  
(X) * Pain and nausea absent or adequately managed  
(X) * Vascular, soft tissue, and wound status acceptable  
(X) * Fracture or injury absent or status acceptable  
(X) * No spinal surgery, or status acceptable  
(X) * No bone harvest, or donor site acceptable ( ) * General Discharge Criteria met Interventions  
(X) * Intake acceptable ( ) * No inpatient interventions needed 10/16/2018 4:59 PM EDT by Melisa Barrios Subject: Additional Clinical Information  
  
no new labs or imaging Ancef 2 g iv q 8h, heparin 5000 units sc q 8h, Lantus sc daily 40 u, SSI sc x 4 over 24h, humalog sc 4 u TID , lopressor po q 12h, morphine iv x 1 today. protonix po and prednisone 10 mg po   and effexor po daily,  
  
orders; diab diet, Case Management  to seek  [facility]  , PT.  
  
DC plan ;  No  SNF [address] [facility] [address]   per Sarah Walsh, and some [facility] team members not yet  available , and now Auth being  sought for SNF   per [employer] . [facility] working with Pt &family . PER PT;Pt with difficulty during transfers today, reporting persistent lightheadedness with sup>sit and sitstand transfers; impeding progression of gait training as pt had to return to seated for safety.   Pt instructed in breathing techniques which improved tolerance to changes of position, stated during first trial that hands became numb, however this resolved when pt sat and did not return throughout several more sitstand trials. Pt educated on strategies to maintain PWB/ heel weight bearing during transfers and gait, demonstrates improved compliance today. * Milestone Additional Notes DC  is pending SNF bed and auth. --------PER MEDICINE  
hx of DM, HTN, sarcoidosis, afib, HLD, GERD, daily tobacco abuse presented to the ED on 10/5 with sepsis.  Pt states he had an infection in his right foot ~one month. [facility] was [facility] at [facility] and they wanted to admit him for further work up and IV Abx, however pt refused admission. Santa Smart then, pt reports the area has slowly gotten worse with increased swelling and drainage.  Xray right foot showed deossification along both sides of DIP joint second toe, could represent early osteomyelitis.  A1C 9.6% this admission.  Pt admitted for further management of care, PCCM consulted, podiatry consulted, pt started on Kaur Mac is s/p right second toe amputation by Dr. Marlen Hansen on 10/6 with clear margins.  Surgical path with acute and chronic osteomyelitis.  ID consulted on 10/9.  Cultures with MSSA and FBS.  PICC placed on 10/11.  ID recommends Ancef 2 grams IV TID through 11/18 with weekly labs (CBC w/ diff, BMP and ESR).  S/p irrigation and debridement, delayed closure of right foot by Dr. Merissa Berry on 10/12. Brittany Sherwood authorization for [address]  Pt is to receive Ancef 2 grams IV TID through 11/18/2018. Ernie Hobbs obtain weekly CBC w/ diff, BMP and ESR.  PICC is to be discontinued once IV abx completed.  Pt is to follow up with PCP within one week, follow up with podiatry within one week in wound care clinic-DSD change with betadine soaked gauze.

## 2018-10-23 NOTE — ADT AUTH CERT NOTES
Utilization Reviews Oct 12 review by Asa Roots Review Status Review Entered In Primary 10/18/2018 10:08 Criteria Review  
-----vs-98.1-67-18, 148/91, sat 100 RA. 
  
-----wbc 10.4, ANC 8.8. Glucose 444*. 
  
-----meds; Mag sulf 2 g iv , KCL po 40 meq, Ancef iv q 8h, bpuivacaine solution iv, D 50- 12.5 g iv , Lantus 25 u sc q hs, SSI sc x 2, lopressor po q 12h, zofran 4 mg iv ,protonix po, prednisone po 10 mg daily, effexor po q hs. 
  
---PODIATRY OCT 12;  
Date of Procedure: 10/12/2018 Preoperative Diagnosis: open amputation site right foot Postoperative Diagnosis: same  
Procedure(s): EXTREMITY IRRIGATION AND DEBRIDEMENT AND DELAYED CLOSURE OF right FOOT 
  
  
Oct 12 per ID;  
Sepsis POA- suspect sec to cellulitis and abscess/OM of second toe - clinical features resolved 
- afebrile now and with normal WBC -    
Rt DFU involving second toe- OM and ?abscess and cellulitis: MSSA, GBS 
- Xray reviewed and concerning for second DIP joint infection - ESR 39 and  - S/p amputation at MTP joint, Cx BHS, SA 
- Path s/p acute and chronic OM but was sent from toe itself and not clear margin so positive as expected- cannot ro residual involvement of infected margin - needs PICC- ordered for today 
  
-ancef 2g iv q 8h through 11/18  
- weekly lab- cbc/diff, bmp and esr 
-fu with ID 2 weeks post dc 
  
- wrote HH orders and discussed with pt 
-plans for transfer noted. Will see pt again as out pt in 2 weeks 
   
DM2- Uncontrolled- HbA1c of >9 - Needs better control of sugars for better wd healing and prevention of subsequent infections H/o Sarcoidosis- on prednisone - Immunocompromised host 
- at risk for infection and poor healing on steroids Erythema nodosum of legs 
- suspect sec to sarcoidosis - LWC 
- Control of dis  
  
PER MEDICINE 10/12- 
hx of DM, HTN, sarcoidosis, afib, HLD, GERD, daily tobacco abuse presented to the ED on 10/5 with sepsis.  Pt states he had an infection in his right foot ~one month. Jana Barthel was seen at [facility] and they wanted to admit him for further work up and IV Abx, however pt refused admission. Brittney Trevizo then, pt reports the area has slowly gotten worse with increased swelling and drainage.  Xray right foot showed deossification along both sides of DIP joint second toe, could represent early osteomyelitis.  A1C 9.6% this admission.  Pt admitted for further management of care, PCCM consulted, podiatry consulted, pt started on Colin Quirosick is s/p right second toe amputation by Dr. Cheryl Mirza on 10/6 with clear margins.  Surgical path with acute and chronic osteomyelitis.  ID consulted on 10/9.  Cultures with MSSA and FBS.  PICC placed on 10/11.  ID recommends Ancef 2 grams IV TID through 11/18 with weekly labs (CBC w/ diff, BMP and ESR).  Per podiatry, if continues to improve will consider wound VAC placement.    
  
Sepsis 
- secondary to diabetic foot ulcer 
- lactic acid now normal 
- ID following, recommend Ancef TID through 11/8 
- surgical culture with few streptococci beta hemolytic group B, rare staph aureus 
- blood cultures collected 10/5 NGTD x 2 
  
Osteomyelitis of right 2nd digit - podiatry following - s/p amputation right 2nd digit on 10/6 
- path with acute and chronic OM 
- surgical culture with few streptococci beta hemolytic Group B, rare staph aureus 
- blood cultures NGTD x 2 
- ID recommends Ancef TID through 11/8 
- for irrigation, debridement and delayed closure today with Dr. Mickey Weber 
  
Afib 
- tele monitoring 
- was on  mg PTA- currently on hold 
  
Diabetes - A1C 9.6% this admission 
- monitor accuchecks, correctional SSI, Humalog 3 units TID, Lantus 27 units nightly  
  
Hx of sarcoidosis - Prednisone 10 mg daily 
  
Hypertension - Lopressor 75 mg BID 
  
DVT Prophylaxis - SCD's BLE 
  
Disposition - plan to d/c to [facility] [address] 
  
   
 Additional Notes Dc planning per CM; [facility] and [facility] with Emmy Mcnair who handles the SNF rehab for [place] patient. She has pt in system and he is active and can use SNF benefit. She is in agreement with referral to [address] Musculoskeletal Surgery or Procedure GRG - Care Day 12 (10/16/2018) by Marina Roldan Status Review Entered Completed 10/16/2018 16:59 Criteria Review Care Day: 12 Care Date: 10/16/2018 Level of Care: Inpatient Floor Guideline Day 3 Level Of Care ( ) * Activity level acceptable 10/16/2018 4:59 PM EDT by Saint Coil reporting persistent lightheadedness with sup>sit and sitstand transfers; impeding progression of gait training ( ) * Complete discharge planning Clinical Status  
(X) * Operative site and other wounds acceptable  
(X) * Temperature status acceptable 10/16/2018 4:59 PM EDT by Igor Velasquez  
  98.6  °F (37  °C) 69 bp 104/74  -- -R- 18 sat 99 % ra  
  
(X) * No infection, or status acceptable 10/16/2018 4:59 PM EDT by Saint Coil Pt is to receive Ancef 2 grams IV TID through 11/18/2018. PICC line  
  
(X) * No blood loss, or problem resolved  
(X) * Pain and nausea absent or adequately managed  
(X) * Vascular, soft tissue, and wound status acceptable  
(X) * Fracture or injury absent or status acceptable  
(X) * No spinal surgery, or status acceptable  
(X) * No bone harvest, or donor site acceptable ( ) * General Discharge Criteria met Interventions  
(X) * Intake acceptable ( ) * No inpatient interventions needed 10/16/2018 4:59 PM EDT by Igor Velasquez Subject: Additional Clinical Information  
  
no new labs or imaging Ancef 2 g iv q 8h, heparin 5000 units sc q 8h, Lantus sc daily 40 u, SSI sc x 4 over 24h, humalog sc 4 u TID , lopressor po q 12h, morphine iv x 1 today.  protonix po and prednisone 10 mg po   and effexor po daily,  
  
 orders; diab diet, Case Management  to seek  [facility].  
  
DC plan ;  No  SNF [address]   per Sarah Walsh, and some [facility] team members not yet  available , and now Auth being  sought for SNF   per [employer] . [facility] working with Pt &family . PER PT;Pt with difficulty during transfers today, reporting persistent lightheadedness with sup>sit and sitstand transfers; impeding progression of gait training as pt had to return to seated for safety.   Pt instructed in breathing techniques which improved tolerance to changes of position, stated during first trial that hands became numb, however this resolved when pt sat and did not return throughout several more sitstand trials. Pt educated on strategies to maintain PWB/ heel weight bearing during transfers and gait, demonstrates improved compliance today. * Milestone Additional Notes DC  is pending SNF bed and auth.   
--------PER MEDICINE  
hx of DM, HTN, sarcoidosis, afib, HLD, GERD, daily tobacco abuse presented to the ED on 10/5 with sepsis.  Pt states he had an infection in his right foot ~one month. Noah Chavez was seen at [facility] and they wanted to admit him for further work up and IV Abx, however pt refused admission. Hester Bones then, pt reports the area has slowly gotten worse with increased swelling and drainage.  Xray right foot showed deossification along both sides of DIP joint second toe, could represent early osteomyelitis.  A1C 9.6% this admission.  Pt admitted for further management of care, PCCM consulted, podiatry consulted, pt started on Arlester Handler is s/p right second toe amputation by Dr. Devi Meek on 10/6 with clear margins.  Surgical path with acute and chronic osteomyelitis.  ID consulted on 10/9.  Cultures with MSSA and FBS.  PICC placed on 10/11.  ID recommends Ancef 2 grams IV TID through 11/18 with weekly labs (CBC w/ diff, BMP and ESR).   S/p irrigation and debridement, delayed closure of right foot by Dr. Al Solomon on 10/12. Chika Pak authorization for [address]  Pt is to receive Ancef 2 grams IV TID through 11/18/2018. Walt Taylor obtain weekly CBC w/ diff, BMP and ESR.  PICC is to be discontinued once IV abx completed.  Pt is to follow up with PCP within one week, follow up with podiatry within one week in wound care clinic-DSD change with betadine soaked gauze.

## 2018-10-31 PROBLEM — E11.65 DIABETES MELLITUS WITH HYPERGLYCEMIA (HCC): Status: ACTIVE | Noted: 2018-10-31

## 2018-10-31 PROBLEM — R42 POSTURAL DIZZINESS WITH PRESYNCOPE: Status: ACTIVE | Noted: 2018-10-31

## 2018-10-31 PROBLEM — R55 POSTURAL DIZZINESS WITH PRESYNCOPE: Status: ACTIVE | Noted: 2018-10-31

## 2018-10-31 PROBLEM — I95.9 HYPOTENSION: Status: ACTIVE | Noted: 2018-10-31

## 2018-11-05 LAB
BACTERIA SPEC CULT: NORMAL
FUNGUS SMEAR,FNGSMR: NORMAL
SERVICE CMNT-IMP: NORMAL

## 2020-06-28 ENCOUNTER — HOSPITAL ENCOUNTER (EMERGENCY)
Age: 50
Discharge: HOME | End: 2020-06-28
Attending: EMERGENCY MEDICINE
Payer: COMMERCIAL

## 2020-06-28 ENCOUNTER — APPOINTMENT (OUTPATIENT)
Dept: CT IMAGING | Age: 50
End: 2020-06-28
Attending: EMERGENCY MEDICINE
Payer: COMMERCIAL

## 2020-06-28 VITALS
HEART RATE: 114 BPM | WEIGHT: 190 LBS | DIASTOLIC BLOOD PRESSURE: 88 MMHG | RESPIRATION RATE: 16 BRPM | OXYGEN SATURATION: 98 % | BODY MASS INDEX: 24.38 KG/M2 | SYSTOLIC BLOOD PRESSURE: 167 MMHG | TEMPERATURE: 99.7 F | HEIGHT: 74 IN

## 2020-06-28 DIAGNOSIS — R00.0 TACHYCARDIA: ICD-10-CM

## 2020-06-28 DIAGNOSIS — E86.0 DEHYDRATION: Primary | ICD-10-CM

## 2020-06-28 LAB
ALBUMIN SERPL-MCNC: 2.6 G/DL (ref 3.4–5)
ALBUMIN/GLOB SERPL: 0.7 {RATIO} (ref 0.8–1.7)
ALP SERPL-CCNC: 130 U/L (ref 45–117)
ALT SERPL-CCNC: 28 U/L (ref 16–61)
AMPHET UR QL SCN: NEGATIVE
ANION GAP SERPL CALC-SCNC: 11 MMOL/L (ref 3–18)
APPEARANCE UR: CLEAR
AST SERPL-CCNC: 25 U/L (ref 10–38)
BACTERIA URNS QL MICRO: NEGATIVE /HPF
BARBITURATES UR QL SCN: NEGATIVE
BASOPHILS # BLD: 0 K/UL (ref 0–0.1)
BASOPHILS NFR BLD: 0 % (ref 0–2)
BENZODIAZ UR QL: NEGATIVE
BILIRUB SERPL-MCNC: 1.1 MG/DL (ref 0.2–1)
BILIRUB UR QL: NEGATIVE
BUN SERPL-MCNC: 9 MG/DL (ref 7–18)
BUN/CREAT SERPL: 15 (ref 12–20)
CALCIUM SERPL-MCNC: 9 MG/DL (ref 8.5–10.1)
CANNABINOIDS UR QL SCN: NEGATIVE
CHLORIDE SERPL-SCNC: 105 MMOL/L (ref 100–111)
CO2 SERPL-SCNC: 21 MMOL/L (ref 21–32)
COCAINE UR QL SCN: NEGATIVE
COLOR UR: YELLOW
CREAT SERPL-MCNC: 0.62 MG/DL (ref 0.6–1.3)
DIFFERENTIAL METHOD BLD: ABNORMAL
EOSINOPHIL # BLD: 0.1 K/UL (ref 0–0.4)
EOSINOPHIL NFR BLD: 1 % (ref 0–5)
EPITH CASTS URNS QL MICRO: NORMAL /LPF (ref 0–5)
ERYTHROCYTE [DISTWIDTH] IN BLOOD BY AUTOMATED COUNT: 14.5 % (ref 11.6–14.5)
GLOBULIN SER CALC-MCNC: 3.8 G/DL (ref 2–4)
GLUCOSE BLD STRIP.AUTO-MCNC: 197 MG/DL (ref 70–110)
GLUCOSE SERPL-MCNC: 197 MG/DL (ref 74–99)
GLUCOSE UR STRIP.AUTO-MCNC: >1000 MG/DL
HCT VFR BLD AUTO: 45.1 % (ref 36–48)
HDSCOM,HDSCOM: NORMAL
HGB BLD-MCNC: 15.1 G/DL (ref 13–16)
HGB UR QL STRIP: ABNORMAL
KETONES UR QL STRIP.AUTO: >160 MG/DL
LEUKOCYTE ESTERASE UR QL STRIP.AUTO: NEGATIVE
LYMPHOCYTES # BLD: 1.5 K/UL (ref 0.9–3.6)
LYMPHOCYTES NFR BLD: 12 % (ref 21–52)
MAGNESIUM SERPL-MCNC: 1.6 MG/DL (ref 1.6–2.6)
MCH RBC QN AUTO: 29.9 PG (ref 24–34)
MCHC RBC AUTO-ENTMCNC: 33.5 G/DL (ref 31–37)
MCV RBC AUTO: 89.3 FL (ref 74–97)
METHADONE UR QL: NEGATIVE
MONOCYTES # BLD: 1.1 K/UL (ref 0.05–1.2)
MONOCYTES NFR BLD: 8 % (ref 3–10)
NEUTS SEG # BLD: 9.9 K/UL (ref 1.8–8)
NEUTS SEG NFR BLD: 79 % (ref 40–73)
NITRITE UR QL STRIP.AUTO: NEGATIVE
OPIATES UR QL: NEGATIVE
PCP UR QL: NEGATIVE
PH UR STRIP: 5 [PH] (ref 5–8)
PLATELET # BLD AUTO: 142 K/UL (ref 135–420)
PMV BLD AUTO: 11.2 FL (ref 9.2–11.8)
POTASSIUM SERPL-SCNC: 3.9 MMOL/L (ref 3.5–5.5)
PROT SERPL-MCNC: 6.4 G/DL (ref 6.4–8.2)
PROT UR STRIP-MCNC: 100 MG/DL
RBC # BLD AUTO: 5.05 M/UL (ref 4.7–5.5)
RBC #/AREA URNS HPF: NORMAL /HPF (ref 0–5)
SODIUM SERPL-SCNC: 137 MMOL/L (ref 136–145)
SP GR UR REFRACTOMETRY: 1.03 (ref 1–1.03)
TROPONIN I SERPL-MCNC: 0.02 NG/ML (ref 0–0.04)
TSH SERPL DL<=0.05 MIU/L-ACNC: 1.28 UIU/ML (ref 0.36–3.74)
UROBILINOGEN UR QL STRIP.AUTO: 0.2 EU/DL (ref 0.2–1)
WBC # BLD AUTO: 12.6 K/UL (ref 4.6–13.2)
WBC URNS QL MICRO: NORMAL /HPF (ref 0–4)

## 2020-06-28 PROCEDURE — 93005 ELECTROCARDIOGRAM TRACING: CPT

## 2020-06-28 PROCEDURE — 83735 ASSAY OF MAGNESIUM: CPT

## 2020-06-28 PROCEDURE — 74011250636 HC RX REV CODE- 250/636: Performed by: EMERGENCY MEDICINE

## 2020-06-28 PROCEDURE — 74011000258 HC RX REV CODE- 258: Performed by: EMERGENCY MEDICINE

## 2020-06-28 PROCEDURE — 74011636320 HC RX REV CODE- 636/320: Performed by: EMERGENCY MEDICINE

## 2020-06-28 PROCEDURE — 84484 ASSAY OF TROPONIN QUANT: CPT

## 2020-06-28 PROCEDURE — 84443 ASSAY THYROID STIM HORMONE: CPT

## 2020-06-28 PROCEDURE — 80307 DRUG TEST PRSMV CHEM ANLYZR: CPT

## 2020-06-28 PROCEDURE — 74011250637 HC RX REV CODE- 250/637: Performed by: EMERGENCY MEDICINE

## 2020-06-28 PROCEDURE — 82962 GLUCOSE BLOOD TEST: CPT

## 2020-06-28 PROCEDURE — 96374 THER/PROPH/DIAG INJ IV PUSH: CPT

## 2020-06-28 PROCEDURE — 80053 COMPREHEN METABOLIC PANEL: CPT

## 2020-06-28 PROCEDURE — 85025 COMPLETE CBC W/AUTO DIFF WBC: CPT

## 2020-06-28 PROCEDURE — 96361 HYDRATE IV INFUSION ADD-ON: CPT

## 2020-06-28 PROCEDURE — 99285 EMERGENCY DEPT VISIT HI MDM: CPT

## 2020-06-28 PROCEDURE — 81001 URINALYSIS AUTO W/SCOPE: CPT

## 2020-06-28 RX ORDER — METOPROLOL SUCCINATE 50 MG/1
50 TABLET, EXTENDED RELEASE ORAL
Status: COMPLETED | OUTPATIENT
Start: 2020-06-28 | End: 2020-06-28

## 2020-06-28 RX ORDER — SODIUM CHLORIDE 9 MG/ML
1000 INJECTION, SOLUTION INTRAVENOUS CONTINUOUS
Status: DISCONTINUED | OUTPATIENT
Start: 2020-06-28 | End: 2020-06-29 | Stop reason: HOSPADM

## 2020-06-28 RX ORDER — KETOROLAC TROMETHAMINE 30 MG/ML
15 INJECTION, SOLUTION INTRAMUSCULAR; INTRAVENOUS
Status: COMPLETED | OUTPATIENT
Start: 2020-06-28 | End: 2020-06-28

## 2020-06-28 RX ADMIN — KETOROLAC TROMETHAMINE 15 MG: 30 INJECTION, SOLUTION INTRAMUSCULAR at 18:41

## 2020-06-28 RX ADMIN — IOPAMIDOL 100 ML: 755 INJECTION, SOLUTION INTRAVENOUS at 18:16

## 2020-06-28 RX ADMIN — SODIUM CHLORIDE 1000 ML: 900 INJECTION, SOLUTION INTRAVENOUS at 18:33

## 2020-06-28 RX ADMIN — SODIUM CHLORIDE 100 ML: 9 INJECTION, SOLUTION INTRAVENOUS at 18:16

## 2020-06-28 RX ADMIN — SODIUM CHLORIDE 1000 ML: 900 INJECTION, SOLUTION INTRAVENOUS at 16:53

## 2020-06-28 RX ADMIN — SODIUM CHLORIDE 500 ML: 900 INJECTION, SOLUTION INTRAVENOUS at 16:53

## 2020-06-28 RX ADMIN — METOPROLOL SUCCINATE 50 MG: 50 TABLET, EXTENDED RELEASE ORAL at 19:37

## 2020-06-28 NOTE — ED TRIAGE NOTES
Patient went fishing on Friday, slept all day Saturday and woke today with generalized weakness, states he has not taken his meds since Thursday

## 2020-06-28 NOTE — ED NOTES
Pt encouraged to provide urine sample, states unable at this time. Refuses cath. Provider made aware.

## 2020-06-28 NOTE — ED PROVIDER NOTES
EMERGENCY DEPARTMENT HISTORY AND PHYSICAL EXAM    4:38 PM      Date: 6/28/2020  Patient Name: Mahamed Ramirez    History of Presenting Illness     Chief Complaint   Patient presents with    Fatigue         History Provided By: Patient      Additional History (Context): Mahamed Ramirez is a 54 y.o. male who presents with complaints of feeling lightheaded and generalized weakness and fatigue. Patient states that he went out fishing on Friday that he was out all day denies drinking any alcohol. States that he does not remember yesterday that he basically slept all day and then woke up today feeling very fatigued and generalized weakness called 911. Patient denies any chest pain shortness of breath fevers chills nausea vomiting or diarrhea he states he has had decreased p.o. intake but is only been drinking Gatorade for the past 2 days. He has not been taking his medications he has not been taking his insulin does not know what his normal glucoses. Is complaining of some very mild lower back pain denies any radiculopathy increased numbness or weakness in his lower extremities. Social history is remarkable for rare alcohol he denies in the past week he denies any tobacco or recreational drug use    PCP: Marleny Preciado MD      Current Facility-Administered Medications   Medication Dose Route Frequency Provider Last Rate Last Dose    0.9% sodium chloride infusion 1,000 mL  1,000 mL IntraVENous CONTINUOUS Tristen Damon MD   Stopped at 06/28/20 1925     Current Outpatient Medications   Medication Sig Dispense Refill    metFORMIN (GLUCOPHAGE) 1,000 mg tablet Take 1 Tab by mouth two (2) times daily (with meals). 60 Tab 0    insulin lispro  & lisp protamine, human, (HUMALOG MIX 75-25 KWIKPEN) 100 unit/mL (75-25) inpn 60 Units by SubCUTAneous route two (2) times a day. 1 Pen 3    ferrous gluconate 324 mg (38 mg iron) tablet Take 1 Tab by mouth two (2) times a day.  60 Tab 3    ascorbic acid, vitamin C, (VITAMIN C) 500 mg tablet Take 1 Tab by mouth two (2) times a day. Take with iron pill 60 Tab 3    folic acid (FOLVITE) 1 mg tablet Take 1 Tab by mouth daily. 30 Tab 3    cyanocobalamin, vitamin B-12, 5,000 mcg subl 1 Tab by SubLINGual route daily. 30 Tab 3    oxyCODONE-acetaminophen (PERCOCET) 5-325 mg per tablet Take 2 Tabs by mouth every four (4) hours as needed. Max Daily Amount: 12 Tabs. 36 Tab 0    gemfibrozil (LOPID) 600 mg tablet Take 600 mg by mouth two (2) times a day.  GABAPENTIN PO Take  by mouth.  albuterol (PROVENTIL HFA, VENTOLIN HFA, PROAIR HFA) 90 mcg/actuation inhaler Take 2 Puffs by inhalation every four (4) hours as needed for Wheezing.  dextran 70-hypromellose (ARTIFICIAL TEARS) ophthalmic solution Administer 2 Drops to both eyes as needed.  omeprazole (PRILOSEC) 20 mg capsule Take 20 mg by mouth daily.  predniSONE (DELTASONE) 10 mg tablet Take 25 mg by mouth daily (with breakfast). Indications: SARCOIDOSIS      venlafaxine-SR (EFFEXOR-XR) 75 mg capsule Take  by mouth daily. Indications: POST TRAUMATIC STRESS DISORDER      aspirin (ASPIRIN) 325 mg tablet Take 325 mg by mouth daily. Past History     Past Medical History:  Past Medical History:   Diagnosis Date    Diabetes ([facility] [address]     Hypercholesteremia     Myocardial infarct ([facility] [address]     Sarcoidosis        Past Surgical History:  Past Surgical History:   Procedure Laterality Date    HX AMPUTATION      toe R foot    HX CORONARY STENT PLACEMENT      HX HEART CATHETERIZATION         Family History:  No family history on file. Social History:  Social History     Tobacco Use    Smoking status: Current Every Day Smoker     Packs/day: 0.50    Smokeless tobacco: Never Used   Substance Use Topics    Alcohol use: No    Drug use: No       Allergies:  No Known Allergies      Review of Systems       Review of Systems   Constitutional: Positive for activity change, appetite change and fatigue.  Negative for chills and fever. Respiratory: Negative for shortness of breath. Cardiovascular: Negative for chest pain. Gastrointestinal: Negative for abdominal pain, nausea and vomiting. Genitourinary: Positive for decreased urine volume. Musculoskeletal: Positive for back pain. Skin: Negative for rash. Neurological: Positive for weakness. Negative for dizziness and syncope. All other systems reviewed and are negative. Physical Exam     Visit Vitals  BP (!) 164/101   Pulse (!) 117   Temp 99.7 °F (37.6 °C)   Resp 16   Ht 6' 2\" (1.88 m)   Wt 86.2 kg (190 lb)   SpO2 100%   BMI 24.39 kg/m²       Physical Exam  Vitals signs and nursing note reviewed. Constitutional:       General: He is not in acute distress. Appearance: He is well-developed. HENT:      Head: Normocephalic and atraumatic. Mouth/Throat:      Mouth: Mucous membranes are dry. Eyes:      General: No scleral icterus. Conjunctiva/sclera: Conjunctivae normal.      Pupils: Pupils are equal, round, and reactive to light. Neck:      Musculoskeletal: Normal range of motion and neck supple. Cardiovascular:      Rate and Rhythm: Regular rhythm. Tachycardia present. Heart sounds: Normal heart sounds. No murmur. Pulmonary:      Effort: Pulmonary effort is normal. No respiratory distress. Breath sounds: Normal breath sounds. Abdominal:      General: Bowel sounds are normal. There is no distension. Palpations: Abdomen is soft. Tenderness: There is no abdominal tenderness. Lymphadenopathy:      Cervical: No cervical adenopathy. Skin:     General: Skin is warm and dry. Findings: No rash. Neurological:      Mental Status: He is alert and oriented to person, place, and time.       Coordination: Coordination normal.   Psychiatric:         Behavior: Behavior normal.           Diagnostic Study Results     Labs -  Recent Results (from the past 12 hour(s))   CBC WITH AUTOMATED DIFF    Collection Time: 06/28/20  4:10 PM Result Value Ref Range    WBC 12.6 4.6 - 13.2 K/uL    RBC 5.05 4.70 - 5.50 M/uL    HGB 15.1 13.0 - 16.0 g/dL    HCT 45.1 36.0 - 48.0 %    MCV 89.3 74.0 - 97.0 FL    MCH 29.9 24.0 - 34.0 PG    MCHC 33.5 31.0 - 37.0 g/dL    RDW 14.5 11.6 - 14.5 %    PLATELET 669 248 - 030 K/uL    MPV 11.2 9.2 - 11.8 FL    NEUTROPHILS 79 (H) 40 - 73 %    LYMPHOCYTES 12 (L) 21 - 52 %    MONOCYTES 8 3 - 10 %    EOSINOPHILS 1 0 - 5 %    BASOPHILS 0 0 - 2 %    ABS. NEUTROPHILS 9.9 (H) 1.8 - 8.0 K/UL    ABS. LYMPHOCYTES 1.5 0.9 - 3.6 K/UL    ABS. MONOCYTES 1.1 0.05 - 1.2 K/UL    ABS. EOSINOPHILS 0.1 0.0 - 0.4 K/UL    ABS. BASOPHILS 0.0 0.0 - 0.1 K/UL    DF AUTOMATED     METABOLIC PANEL, COMPREHENSIVE    Collection Time: 06/28/20  4:10 PM   Result Value Ref Range    Sodium 137 136 - 145 mmol/L    Potassium 3.9 3.5 - 5.5 mmol/L    Chloride 105 100 - 111 mmol/L    CO2 21 21 - 32 mmol/L    Anion gap 11 3.0 - 18 mmol/L    Glucose 197 (H) 74 - 99 mg/dL    BUN 9 7.0 - 18 MG/DL    Creatinine 0.62 0.6 - 1.3 MG/DL    BUN/Creatinine ratio 15 12 - 20      GFR est AA >60 >60 ml/min/1.73m2    GFR est non-AA >60 >60 ml/min/1.73m2    Calcium 9.0 8.5 - 10.1 MG/DL    Bilirubin, total 1.1 (H) 0.2 - 1.0 MG/DL    ALT (SGPT) 28 16 - 61 U/L    AST (SGOT) 25 10 - 38 U/L    Alk.  phosphatase 130 (H) 45 - 117 U/L    Protein, total 6.4 6.4 - 8.2 g/dL    Albumin 2.6 (L) 3.4 - 5.0 g/dL    Globulin 3.8 2.0 - 4.0 g/dL    A-G Ratio 0.7 (L) 0.8 - 1.7     MAGNESIUM    Collection Time: 06/28/20  4:10 PM   Result Value Ref Range    Magnesium 1.6 1.6 - 2.6 mg/dL   TROPONIN I    Collection Time: 06/28/20  4:10 PM   Result Value Ref Range    Troponin-I, QT 0.02 0.0 - 0.045 NG/ML   TSH 3RD GENERATION    Collection Time: 06/28/20  4:10 PM   Result Value Ref Range    TSH 1.28 0.36 - 3.74 uIU/mL   GLUCOSE, POC    Collection Time: 06/28/20  4:18 PM   Result Value Ref Range    Glucose (POC) 197 (H) 70 - 110 mg/dL   EKG, 12 LEAD, INITIAL    Collection Time: 06/28/20  4:48 PM   Result Value Ref Range    Ventricular Rate 113 BPM    Atrial Rate 113 BPM    P-R Interval 126 ms    QRS Duration 88 ms    Q-T Interval 330 ms    QTC Calculation (Bezet) 452 ms    Calculated P Axis 60 degrees    Calculated R Axis 59 degrees    Calculated T Axis -137 degrees    Diagnosis       Sinus tachycardia  Possible Left atrial enlargement  ST & T wave abnormality, consider lateral ischemia  Abnormal ECG  When compared with ECG of 05-OCT-2018 19:31,  ST no longer depressed in Inferior leads  Nonspecific T wave abnormality has replaced inverted T waves in Inferior   leads     URINALYSIS W/ RFLX MICROSCOPIC    Collection Time: 06/28/20  5:45 PM   Result Value Ref Range    Color YELLOW      Appearance CLEAR      Specific gravity 1.026 1.005 - 1.030      pH (UA) 5.0 5.0 - 8.0      Protein 100 (A) NEG mg/dL    Glucose >1,000 (A) NEG mg/dL    Ketone >160 (A) NEG mg/dL    Bilirubin Negative NEG      Blood SMALL (A) NEG      Urobilinogen 0.2 0.2 - 1.0 EU/dL    Nitrites Negative NEG      Leukocyte Esterase Negative NEG     DRUG SCREEN, URINE    Collection Time: 06/28/20  5:45 PM   Result Value Ref Range    BENZODIAZEPINES Negative NEG      BARBITURATES Negative NEG      THC (TH-CANNABINOL) Negative NEG      OPIATES Negative NEG      PCP(PHENCYCLIDINE) Negative NEG      COCAINE Negative NEG      AMPHETAMINES Negative NEG      METHADONE Negative NEG      HDSCOM (NOTE)    URINE MICROSCOPIC ONLY    Collection Time: 06/28/20  5:45 PM   Result Value Ref Range    WBC 0 to 3 0 - 4 /hpf    RBC 0 to 3 0 - 5 /hpf    Epithelial cells FEW 0 - 5 /lpf    Bacteria Negative NEG /hpf       Radiologic Studies -   No orders to display         Medical Decision Making   I am the first provider for this patient. I reviewed the vital signs, available nursing notes, past medical history, past surgical history, family history and social history. Vital Signs-Reviewed the patient's vital signs.       EKG: Poor quality tracing showing sinus tach at a rate of 113 with nonspecific ST-T wave changes QTC is 452    Records Reviewed: Nursing Notes and Old Medical Records (Time of Review: 4:38 PM)    ED Course: Progress Notes, Reevaluation, and Consults:      Provider Notes (Medical Decision Making):   MDM  Number of Diagnoses or Management Options  Dehydration:   Tachycardia:   Diagnosis management comments: Appears dehydrated in the emergency department dry mucous membranes tachycardic nonfocal neurological exam Accu-Chek reviewed will continue fluids check labs    5:50 PM  Patient continues to be tachycardic in the emergency department now up to 120 with his complaint of back pain in the past will CTA chest abdomen pelvis    Urine shows greater than 160 ketones  6:27 PM  We will continue IV fluids, patient called me over to tell me that he always has a fast heart rate that this is nothing new for him. Will cancel CTA    9:32 PM  Patient stable in the emergency department CV IV fluids he does look much improved he feels much improved will discharge home       Amount and/or Complexity of Data Reviewed  Clinical lab tests: ordered  Tests in the radiology section of CPT®: ordered    Risk of Complications, Morbidity, and/or Mortality  Presenting problems: high  Diagnostic procedures: moderate  Management options: moderate            Critical Care Time:       Diagnosis     Clinical Impression:   1. Dehydration    2.  Tachycardia        Disposition: home     Follow-up Information     Follow up With Specialties Details Why [facility] EMERGENCY DEPT Emergency Medicine  As needed, If symptoms worsen [address]  [phone]    Benita Olivas MD Family Practice Schedule an appointment as soon as possible for a visit For ED follow-up [address] [facility]  [phone]             Patient's Medications   Start Taking    No medications on file   Continue Taking    ALBUTEROL (PROVENTIL HFA, VENTOLIN HFA, PROAIR HFA) 90 MCG/ACTUATION INHALER    Take 2 Puffs by inhalation every four (4) hours as needed for Wheezing. ASCORBIC ACID, VITAMIN C, (VITAMIN C) 500 MG TABLET    Take 1 Tab by mouth two (2) times a day. Take with iron pill    ASPIRIN (ASPIRIN) 325 MG TABLET    Take 325 mg by mouth daily. CYANOCOBALAMIN, VITAMIN B-12, 5,000 MCG SUBL    1 Tab by SubLINGual route daily. DEXTRAN 70-HYPROMELLOSE (ARTIFICIAL TEARS) OPHTHALMIC SOLUTION    Administer 2 Drops to both eyes as needed. FERROUS GLUCONATE 324 MG (38 MG IRON) TABLET    Take 1 Tab by mouth two (2) times a day. FOLIC ACID (FOLVITE) 1 MG TABLET    Take 1 Tab by mouth daily. GABAPENTIN PO    Take  by mouth. GEMFIBROZIL (LOPID) 600 MG TABLET    Take 600 mg by mouth two (2) times a day. INSULIN LISPRO  & LISP PROTAMINE, HUMAN, (HUMALOG MIX 75-25 KWIKPEN) 100 UNIT/ML (75-25) INPN    60 Units by SubCUTAneous route two (2) times a day. METFORMIN (GLUCOPHAGE) 1,000 MG TABLET    Take 1 Tab by mouth two (2) times daily (with meals). OMEPRAZOLE (PRILOSEC) 20 MG CAPSULE    Take 20 mg by mouth daily. OXYCODONE-ACETAMINOPHEN (PERCOCET) 5-325 MG PER TABLET    Take 2 Tabs by mouth every four (4) hours as needed. Max Daily Amount: 12 Tabs. PREDNISONE (DELTASONE) 10 MG TABLET    Take 25 mg by mouth daily (with breakfast). Indications: SARCOIDOSIS    VENLAFAXINE-SR (EFFEXOR-XR) 75 MG CAPSULE    Take  by mouth daily. Indications: POST TRAUMATIC STRESS DISORDER   These Medications have changed    No medications on file   Stop Taking    No medications on file     _______________________________    Please note that this dictation was completed with [employer], the computer voice recognition software. Quite often unanticipated grammatical, syntax, homophones, and other interpretive errors are inadvertently transcribed by the computer software. Please disregard these errors. Please excuse any errors that have escaped final proofreading.

## 2020-06-28 NOTE — LETTER
[facility] EMERGENCY DEPT 
[address] 
[address] [facility] [address] [phone] Work/School Note Date: 6/28/2020 To Whom It May concern: 
 
Bennett Hart was seen and treated today in the emergency room by the following provider(s): 
Attending Provider: Sahil Giraldo MD.   
 
Bennett Hart may return to work on 6/30/2020. Sincerely, Melia Sol MD

## 2020-06-29 LAB
ATRIAL RATE: 113 BPM
CALCULATED P AXIS, ECG09: 60 DEGREES
CALCULATED R AXIS, ECG10: 59 DEGREES
CALCULATED T AXIS, ECG11: -137 DEGREES
DIAGNOSIS, 93000: NORMAL
P-R INTERVAL, ECG05: 126 MS
Q-T INTERVAL, ECG07: 330 MS
QRS DURATION, ECG06: 88 MS
QTC CALCULATION (BEZET), ECG08: 452 MS
VENTRICULAR RATE, ECG03: 113 BPM

## 2020-06-29 NOTE — ED NOTES
I have reviewed discharge instructions with the patient. The patient verbalized understanding. Agreeable to discharge instructions. No sx of distress, wheelchair to ED lobby.

## 2020-06-29 NOTE — DISCHARGE INSTRUCTIONS
Patient Education        Dehydration: Care Instructions  Your Care Instructions  Dehydration happens when your body loses too much fluid. This might happen when you do not drink enough water or you lose large amounts of fluids from your body because of diarrhea, vomiting, or sweating. Severe dehydration can be life-threatening. Water and minerals called electrolytes help put your body fluids back in balance. Learn the early signs of fluid loss, and drink more fluids to prevent dehydration. Follow-up care is a key part of your treatment and safety. Be sure to make and go to all appointments, and call your doctor if you are having problems. It's also a good idea to know your test results and keep a list of the medicines you take. How can you care for yourself at home? · To prevent dehydration, drink plenty of fluids, enough so that your urine is light yellow or clear like water. Choose water and other caffeine-free clear liquids until you feel better. If you have kidney, heart, or liver disease and have to limit fluids, talk with your doctor before you increase the amount of fluids you drink. · If you do not feel like eating or drinking, try taking small sips of water, sports drinks, or other rehydration drinks. · Get plenty of rest.  To prevent dehydration  · Add more fluids to your diet and daily routine, unless your doctor has told you not to. · During hot weather, drink more fluids. Drink even more fluids if you exercise a lot. Stay away from drinks with alcohol or caffeine. · Watch for the symptoms of dehydration. These include:  ? A dry, sticky mouth. ? Dark yellow urine, and not much of it. ? Dry and sunken eyes. ? Feeling very tired. · Learn what problems can lead to dehydration. These include:  ? Diarrhea, fever, and vomiting. ? Any illness with a fever, such as pneumonia or the flu. ?  Activities that cause heavy sweating, such as endurance races and heavy outdoor work in hot or humid weather. ? Alcohol or drug use or problems caused by quitting their use (withdrawal). ? Certain medicines, such as cold and allergy pills (antihistamines), diet pills (diuretics), and laxatives. ? Certain diseases, such as diabetes, cancer, and heart or kidney disease. When should you call for help? [id number] anytime you think you may need emergency care. For example, call if:  · You passed out (lost consciousness). Call your doctor now or seek immediate medical care if:  · You are confused and cannot think clearly. · You are dizzy or lightheaded, or you feel like you may faint. · You have signs of needing more fluids. You have sunken eyes and a dry mouth, and you pass only a little dark urine. · You cannot keep fluids down. Watch closely for changes in your health, and be sure to contact your doctor if:  · You are not making tears. · Your skin is very dry and sags slowly back into place after you pinch it. · Your mouth and eyes are very dry. Where can you learn more? Go to http://www.gray.com/  Enter Q814 in the search box to learn more about \"Dehydration: Care Instructions. \"  Current as of: June 26, 2019               Content Version: 12.5  © 5560-7348 [employer]. Care instructions adapted under license by [employer] (which disclaims liability or warranty for this information). If you have questions about a medical condition or this instruction, always ask your healthcare professional. [place] [address] any warranty or liability for your use of this information.

## 2022-03-18 PROBLEM — E11.621 DIABETIC FOOT ULCER (HCC): Status: ACTIVE | Noted: 2018-10-06

## 2022-03-18 PROBLEM — L97.509 DIABETIC FOOT ULCER (HCC): Status: ACTIVE | Noted: 2018-10-06

## 2022-03-19 PROBLEM — R42 POSTURAL DIZZINESS WITH PRESYNCOPE: Status: ACTIVE | Noted: 2018-10-31

## 2022-03-19 PROBLEM — M00.9: Status: ACTIVE | Noted: 2018-10-06

## 2022-03-19 PROBLEM — E11.65 DIABETES MELLITUS WITH HYPERGLYCEMIA (HCC): Status: ACTIVE | Noted: 2018-10-31

## 2022-03-19 PROBLEM — I95.9 HYPOTENSION: Status: ACTIVE | Noted: 2018-10-31

## 2022-03-19 PROBLEM — E11.8 DIABETIC FOOT (HCC): Status: ACTIVE | Noted: 2018-10-05

## 2022-03-19 PROBLEM — R55 POSTURAL DIZZINESS WITH PRESYNCOPE: Status: ACTIVE | Noted: 2018-10-31

## 2022-07-07 NOTE — DISCHARGE INSTRUCTIONS
Diabetic Foot Ulcer: Care Instructions  Your Care Instructions  Diabetes can damage the nerve endings and blood vessels in your feet. That means you are less likely to notice when your feet are injured. A small skin problem like a callus, blister, or cracked skin can turn into a larger sore, called a foot ulcer. Foot ulcers form most often on the pad (ball) of the foot or the bottom of the big toe. You can also get them on the top and bottom of each toe. Foot ulcers can get infected. If the infection is severe, then tissue in the foot can die. This is called gangrene. In that case, one or more of the toes, part or all of the foot, and sometimes part of the leg may have to be removed (amputated). Your doctor may have removed the dead tissue and cleaned the ulcer. Your foot wound may be wrapped in a protective bandage. It is very important to keep your weight off your injured foot. After a foot ulcer has formed, it will not heal as long as you keep putting weight on the area. Always get early treatment for foot problems. A minor irritation can lead to a major problem if it's not taken care of soon. Follow-up care is a key part of your treatment and safety. Be sure to make and go to all appointments, and call your doctor if you are having problems. It's also a good idea to know your test results and keep a list of the medicines you take. How can you care for yourself at home? · Follow your doctor's instructions about keeping pressure off the foot ulcer. You may need to use crutches or a wheelchair. Or you may wear a cast or a walking boot. · Follow your doctor's instructions on how to clean the ulcer and change the bandage. · If your doctor prescribed antibiotics, take them as directed. Do not stop taking them just because you feel better. You need to take the full course of antibiotics. To prevent foot ulcers  · Keep your blood sugar close to normal by watching what and how much you eat.  Track your blood sugar, take medicines if prescribed, and get regular exercise. · Do not smoke. Smoking affects blood flow and can make foot problems worse. If you need help quitting, talk to your doctor about stop-smoking programs and medicines. These can increase your chances of quitting for good. · Do not go barefoot. Protect your feet by wearing shoes that fit well. Choose shoes that are made of materials that are flexible and breathable, such as leather or cloth. · Inspect your feet daily for blisters, cuts, cracks, or sores. If you can't see well, use a mirror or have someone help you. · Have your doctor check your feet during each visit. If you have a foot problem, see your doctor. Do not try to treat your foot problem on your own. Home remedies or treatments that you can buy without a prescription (such as corn removers) can be harmful. When should you call for help? Call your doctor now or seek immediate medical care if:    · You have symptoms of infection, such as:  ¨ Increased pain, swelling, warmth, or redness. ¨ Red streaks leading from the area. ¨ Pus draining from the area. ¨ A fever.    Watch closely for changes in your health, and be sure to contact your doctor if:    · You have a new problem with your feet, such as:  ¨ A new sore or ulcer. ¨ A break in the skin that is not healing after several days. ¨ Bleeding corns or calluses. ¨ An ingrown toenail.     · You do not get better as expected. Where can you learn more? Go to http://vincenzo-lay.info/. Enter T131 in the search box to learn more about \"Diabetic Foot Ulcer: Care Instructions. \"  Current as of: December 7, 2017  Content Version: 11.8  © 7951-7988 [employer]. Care instructions adapted under license by [employer] (which disclaims liability or warranty for this information).  If you have questions about a medical condition or this instruction, always ask your healthcare professional. [employer] disclaims any warranty or liability for your use of this information. no weight-bearing restrictions

## 2023-02-21 PROBLEM — I25.10 ARTERIOSCLEROSIS OF CORONARY ARTERY: Status: ACTIVE | Noted: 2023-02-21

## 2023-02-21 PROBLEM — I21.4 NSTEMI (NON-ST ELEVATED MYOCARDIAL INFARCTION) (HCC): Status: ACTIVE | Noted: 2019-05-15

## 2023-02-21 PROBLEM — E11.42 DIABETIC PERIPHERAL NEUROPATHY (HCC): Status: ACTIVE | Noted: 2023-02-21

## 2023-02-21 PROBLEM — R07.9 CHEST PAIN, UNSPECIFIED: Status: ACTIVE | Noted: 2023-02-21

## 2023-02-21 PROBLEM — R10.13 EPIGASTRIC PAIN: Status: ACTIVE | Noted: 2021-06-25

## 2023-02-21 PROBLEM — E78.2 MIXED HYPERLIPIDEMIA: Status: ACTIVE | Noted: 2021-06-26

## 2023-02-21 PROBLEM — I51.9 CARDIAC DISEASE: Status: ACTIVE | Noted: 2023-02-21

## 2023-02-21 PROBLEM — E29.1 HYPOGONADISM IN MALE: Status: ACTIVE | Noted: 2021-07-02

## 2023-02-21 PROBLEM — A49.01 METHICILLIN SUSCEPTIBLE STAPHYLOCOCCUS AUREUS INFECTION, UNSPECIFIED SITE: Status: ACTIVE | Noted: 2023-02-21

## 2023-02-21 PROBLEM — K86.9 PANCREATIC LESION: Status: ACTIVE | Noted: 2021-06-27

## 2023-02-21 PROBLEM — J44.9 COPD (CHRONIC OBSTRUCTIVE PULMONARY DISEASE) (HCC): Status: ACTIVE | Noted: 2019-05-15

## 2023-02-21 PROBLEM — K86.89 PANCREATIC MASS: Status: ACTIVE | Noted: 2023-02-21

## 2023-02-21 PROBLEM — I10 ESSENTIAL HYPERTENSION: Status: ACTIVE | Noted: 2019-05-15

## 2023-02-21 PROBLEM — E11.65 TYPE 2 DIABETES MELLITUS WITH HYPERGLYCEMIA (HCC): Status: ACTIVE | Noted: 2023-02-21

## 2023-02-21 PROBLEM — E87.20 METABOLIC ACIDOSIS: Status: ACTIVE | Noted: 2023-02-21

## 2023-02-21 PROBLEM — L02.213 CUTANEOUS ABSCESS OF CHEST WALL: Status: ACTIVE | Noted: 2023-02-21

## 2023-02-21 PROBLEM — E11.9 TYPE 2 DIABETES MELLITUS WITHOUT COMPLICATIONS (HCC): Status: ACTIVE | Noted: 2023-02-21

## 2023-02-21 PROBLEM — E78.00 HYPERCHOLESTEROLEMIA: Status: ACTIVE | Noted: 2023-02-21

## 2023-02-21 PROBLEM — M70.60 GREATER TROCHANTERIC BURSITIS: Status: ACTIVE | Noted: 2023-02-21

## 2023-02-21 PROBLEM — G51.0 BELL'S PALSY: Status: ACTIVE | Noted: 2023-02-21

## 2023-02-21 PROBLEM — R07.89 OTHER CHEST PAIN: Status: ACTIVE | Noted: 2023-02-21

## 2023-02-21 PROBLEM — F43.10 POSTTRAUMATIC STRESS DISORDER: Status: ACTIVE | Noted: 2023-02-21

## 2023-02-21 PROBLEM — R63.4 ABNORMAL WEIGHT LOSS: Status: ACTIVE | Noted: 2023-02-21

## 2023-02-21 PROBLEM — D73.9 DISEASE OF SPLEEN, UNSPECIFIED: Status: ACTIVE | Noted: 2023-02-21

## 2023-02-21 PROBLEM — K63.5 POLYP OF COLON: Status: ACTIVE | Noted: 2023-02-21

## 2023-02-21 PROBLEM — E29.1 TESTICULAR HYPOFUNCTION: Status: ACTIVE | Noted: 2023-02-21

## 2023-02-21 PROBLEM — E16.2 HYPOGLYCEMIA: Status: ACTIVE | Noted: 2021-06-30

## 2023-02-21 PROBLEM — C7A.8 OTHER MALIGNANT NEUROENDOCRINE TUMORS (HCC): Status: ACTIVE | Noted: 2023-02-21

## 2023-02-21 PROBLEM — F52.8 PSYCHOSEXUAL DYSFUNCTION WITH INHIBITED SEXUAL EXCITEMENT: Status: ACTIVE | Noted: 2023-02-21

## 2023-02-21 PROBLEM — D13.6 BENIGN NEOPLASM OF PANCREAS: Status: ACTIVE | Noted: 2023-02-21

## 2023-02-21 PROBLEM — C25.4 MALIGNANT NEOPLASM OF ENDOCRINE PANCREAS (HCC): Status: ACTIVE | Noted: 2023-02-21

## 2023-02-21 PROBLEM — F43.10 PTSD (POST-TRAUMATIC STRESS DISORDER): Status: ACTIVE | Noted: 2019-05-15

## 2023-02-21 PROBLEM — R33.8 OTHER RETENTION OF URINE: Status: ACTIVE | Noted: 2023-02-21

## 2023-02-21 PROBLEM — C25.9 CANCER OF PANCREAS (HCC): Status: ACTIVE | Noted: 2021-12-06

## 2023-02-21 PROBLEM — M54.50 LOW BACK PAIN: Status: ACTIVE | Noted: 2023-02-21

## 2023-02-21 PROBLEM — G47.33 OBSTRUCTIVE SLEEP APNEA SYNDROME: Status: ACTIVE | Noted: 2023-02-21

## 2023-02-21 PROBLEM — R07.9 CHEST PAIN: Status: ACTIVE | Noted: 2018-12-04

## 2023-02-21 PROBLEM — L03.313 CELLULITIS OF CHEST WALL: Status: ACTIVE | Noted: 2023-02-21

## 2023-02-21 PROBLEM — M62.81 MUSCLE WEAKNESS (GENERALIZED): Status: ACTIVE | Noted: 2023-02-21

## 2023-02-21 PROBLEM — E88.9 PERIPHERAL NEUROPATHY DUE TO DISORDER OF METABOLISM (HCC): Status: ACTIVE | Noted: 2023-02-21

## 2023-02-21 PROBLEM — J31.0 CHRONIC RHINITIS: Status: ACTIVE | Noted: 2023-02-21

## 2023-02-21 PROBLEM — D86.9 SARCOID: Status: ACTIVE | Noted: 2023-02-21

## 2023-02-21 PROBLEM — D3A.8 NEUROENDOCRINE TUMOR OF PANCREAS: Status: ACTIVE | Noted: 2023-02-21

## 2023-02-21 PROBLEM — G89.4 CHRONIC PAIN DISORDER: Status: ACTIVE | Noted: 2023-02-21

## 2023-02-21 PROBLEM — D86.0 SARCOIDOSIS OF LUNG (HCC): Status: ACTIVE | Noted: 2018-05-07

## 2023-02-21 PROBLEM — G63 PERIPHERAL NEUROPATHY DUE TO DISORDER OF METABOLISM (HCC): Status: ACTIVE | Noted: 2023-02-21

## 2023-02-21 PROBLEM — E11.9 TYPE 2 DIABETES MELLITUS WITHOUT COMPLICATION (HCC): Status: ACTIVE | Noted: 2023-02-21

## 2023-02-21 PROBLEM — R53.1 WEAKNESS: Status: ACTIVE | Noted: 2023-02-21

## 2023-02-21 PROBLEM — Z89.421 HISTORY OF AMPUTATION OF LESSER TOE OF RIGHT FOOT (HCC): Status: ACTIVE | Noted: 2023-02-21

## 2023-02-21 PROBLEM — I25.10 ATHEROSCLEROSIS OF CORONARY ARTERY: Status: ACTIVE | Noted: 2018-12-04

## 2023-02-21 PROBLEM — S42.201A CLOSED FRACTURE OF RIGHT PROXIMAL HUMERUS: Status: ACTIVE | Noted: 2021-04-14

## 2023-02-21 PROBLEM — N40.0 BENIGN PROSTATIC HYPERPLASIA WITHOUT LOWER URINARY TRACT SYMPTOMS: Status: ACTIVE | Noted: 2021-06-26

## 2023-02-21 PROBLEM — R39.198 ABNORMAL URINARY STREAM: Status: ACTIVE | Noted: 2023-02-21

## 2023-02-21 PROBLEM — R21 RASH AND OTHER NONSPECIFIC SKIN ERUPTION: Status: ACTIVE | Noted: 2023-02-21

## 2023-02-21 PROBLEM — E83.52 HYPERCALCEMIA DUE TO SARCOIDOSIS: Status: ACTIVE | Noted: 2023-02-21

## 2023-02-21 PROBLEM — F32.9 MAJOR DEPRESSIVE DISORDER: Status: ACTIVE | Noted: 2023-02-21

## 2023-02-21 PROBLEM — N61.1 ABSCESS OF THE BREAST AND NIPPLE: Status: ACTIVE | Noted: 2023-02-21

## 2023-02-21 PROBLEM — D86.9 HYPERCALCEMIA DUE TO SARCOIDOSIS: Status: ACTIVE | Noted: 2023-02-21

## 2023-02-21 PROBLEM — D72.829 ELEVATED WHITE BLOOD CELL COUNT, UNSPECIFIED: Status: ACTIVE | Noted: 2023-02-21

## 2023-02-21 PROBLEM — I73.9 PVD (PERIPHERAL VASCULAR DISEASE) (HCC): Status: ACTIVE | Noted: 2021-06-26

## 2023-02-21 PROBLEM — E11.42 TYPE 2 DIABETES MELLITUS WITH DIABETIC POLYNEUROPATHY, WITHOUT LONG-TERM CURRENT USE OF INSULIN (HCC): Status: ACTIVE | Noted: 2023-02-21

## 2023-02-21 PROBLEM — K21.9 GASTROESOPHAGEAL REFLUX DISEASE WITHOUT ESOPHAGITIS: Status: ACTIVE | Noted: 2023-02-21

## 2023-02-21 PROBLEM — G56.00 CARPAL TUNNEL SYNDROME: Status: ACTIVE | Noted: 2023-02-21

## 2023-02-21 PROBLEM — M17.0 OSTEOARTHRITIS OF BOTH KNEES: Status: ACTIVE | Noted: 2023-02-21

## 2023-02-21 PROBLEM — L91.0 HYPERTROPHIC SCAR: Status: ACTIVE | Noted: 2023-02-21

## 2023-02-21 PROBLEM — D3A.8 OTHER BENIGN NEUROENDOCRINE TUMORS: Status: ACTIVE | Noted: 2023-02-21

## 2023-02-21 PROBLEM — F41.9 ANXIETY: Status: ACTIVE | Noted: 2023-02-21

## 2023-04-11 PROBLEM — M00.9 PYOGENIC ARTHRITIS, UNSPECIFIED (HCC): Status: ACTIVE | Noted: 2018-10-17

## 2023-04-11 PROBLEM — R60.9 EDEMA, UNSPECIFIED: Status: ACTIVE | Noted: 2023-04-11

## 2023-04-11 PROBLEM — R74.8 ABNORMAL LEVELS OF OTHER SERUM ENZYMES: Status: ACTIVE | Noted: 2023-04-11

## 2023-04-11 PROBLEM — I48.91 UNSPECIFIED ATRIAL FIBRILLATION (HCC): Status: ACTIVE | Noted: 2018-10-17

## 2023-04-11 PROBLEM — R77.8 OTHER SPECIFIED ABNORMALITIES OF PLASMA PROTEINS: Status: ACTIVE | Noted: 2023-04-11

## 2024-11-14 NOTE — PROGRESS NOTES
7 [facility] Inpatient Daily Progress Note Daily progress Note Patient: Karen Farrar MRN: [id number]  CSN: [id number] YOB: 1970  Age: 41 y.o. Sex: male DOA: 10/5/2018 LOS:  LOS: 9 days Chief Complaint:  Sepsis Interval History: 40 y/o [place] American male with hx of DM, HTN, sarcoidosis, afib, HLD, GERD, daily tobacco abuse presented to the ED on 10/5 with sepsis. Pt states he had an infection in his right foot ~one month. He was seen at [facility] and they wanted to admit him for further work up and IV Abx, however pt refused admission. Since then, pt reports the area has slowly gotten worse with increased swelling and drainage. Xray right foot showed deossification along both sides of DIP joint second toe, could represent early osteomyelitis. A1C 9.6% this admission. Pt admitted for further management of care, PCCM consulted, podiatry consulted, pt started on IV Abx. He is s/p right second toe amputation by Dr. Dasha Rodriguez on 10/6 with clear margins. Surgical path with acute and chronic osteomyelitis. ID consulted on 10/9. Cultures with MSSA and FBS. PICC placed on 10/11. ID recommends Ancef 2 grams IV TID through 11/18 with weekly labs (CBC w/ diff, BMP and ESR). S/p irrigation and debridement, delayed closure of right foot by Dr. Livier Alba on 10/12. Awaiting authorization for [facility] [address] Assessment/Plan:  
 
Patient Active Problem List  
Diagnosis Code  Sarcoidosis D86.9  DKA, type 1 ([address] E10.10  Sepsis ([address] A41.9  Diabetic foot ([address] E11.8  Diabetic foot ulcer ([address] E11.621, L97.509  Septic arthritis of IP joint of toe, right ([address] M00.9 A/P: 
Sepsis 
- secondary to diabetic foot ulcer 
- lactic acid now normal 
- ID following, recommend Ancef TID through 11/8 
- surgical culture with few streptococci beta hemolytic group B, rare staph aureus 
- blood cultures collected 10/5 NGTD x 2 Osteomyelitis of right 2nd digit - podiatry following - s/p amputation right 2nd digit on 10/6 
- path with acute and chronic OM 
- surgical culture with few streptococci beta hemolytic Group B, rare staph aureus 
- blood cultures NGTD x 2 
- ID recommends Ancef TID through 11/8 
- s/p irrigation and debridement, delayed closure of right foot by Dr. Nola Cuba on 10/12 
- PT eval- right heel touch only for pivot Afib 
- tele monitoring 
- was on  mg PTA- currently on hold Diabetes - A1C 9.6% this admission 
- was placed on insulin gtt last night 10/12 
- restart correctional SSI (very insulin resistant), Lantus 35 units daily 
- d/c insulin gtt @11am today 10/13 Hx of sarcoidosis - Prednisone 10 mg daily Hypertension - Lopressor 75 mg BID 
 
DVT Prophylaxis - SCD's BLE 
- Heparin subcutaneously TID Disposition - plan to d/c to [place] [address] [address] - awaiting auth [facility] [address] [place] [address] Hospitalist Division Pager:  [phone] Office:  [phone] Subjective: Interval notes reviewed. No c/o pain. Awaiting d/c to [facility], auth pending. Objective:  
  
Visit Vitals  /76  Pulse 77  Temp 98.2 °F (36.8 °C)  Resp 16  
 Ht 6' 2\" (1.88 m)  Wt 85.3 kg (188 lb)  SpO2 100%  BMI 24.14 kg/m2 Physical Exam: 
General appearance: alert, cooperative, no distress, appears stated age Head: Normocephalic, without obvious abnormality, atraumatic Lungs: clear to auscultation bilaterally Heart: regular rate and rhythm, S1, S2 normal, no murmur, click, rub or gallop Abdomen: soft, non tender, non distended. Normoactive bowel sounds. Extremities: dressing right foot intact Skin: Skin color, texture, turgor normal. No rashes or lesions Neurologic: Grossly normal 
PSY: Mood and affect normal, appropriately behaved Intake and Output: Current Shift:    
Last three shifts:  10/12 1901 - 10/14 0700 In: 978.5 [P.O.:720; I.V.:258.5] Out: 4500 [Urine:4500] Recent Results (from the past 24 hour(s)) GLUCOSE, POC Collection Time: 10/13/18  7:46 AM  
Result Value Ref Range Glucose (POC) 89 70 - 110 mg/dL Leonard Ran Collection Time: 10/13/18  7:49 AM  
Result Value Ref Range Glucose 89 mg/dL Insulin order 0.0 units/hour Insulin adminstered 0.0 units/hour Multiplier 0.000 Low target 140 mg/dL High target 180 mg/dL D50 order 0.0 ml  
 D50 administered 0.00 ml Minutes until next  min Order initials sb Administered initials sb GLUCOSE, POC Collection Time: 10/13/18 12:03 PM  
Result Value Ref Range Glucose (POC) 168 (H) 70 - 110 mg/dL GLUCOSE, POC Collection Time: 10/13/18 10:29 PM  
Result Value Ref Range Glucose (POC) 365 (H) 70 - 110 mg/dL GLUCOSE, POC Collection Time: 10/14/18  2:47 AM  
Result Value Ref Range Glucose (POC) 257 (H) 70 - 110 mg/dL GLUCOSE, POC Collection Time: 10/14/18  6:28 AM  
Result Value Ref Range Glucose (POC) 295 (H) 70 - 110 mg/dL Lab Results Component Value Date/Time Glucose 121 (H) 10/13/2018 02:30 AM  
 Glucose 444 (HH) 10/12/2018 08:20 PM  
 Glucose 99 10/12/2018 04:40 AM  
 Glucose 106 (H) 10/11/2018 04:30 AM  
 Glucose 96 10/09/2018 05:15 AM  
  
 
Imaging: No results found. Medication List Reviewed: 
Current Facility-Administered Medications Medication Dose Route Frequency  insulin lispro (HUMALOG) injection   SubCUTAneous AC&HS  insulin glargine (LANTUS) injection 35 Units  35 Units SubCUTAneous DAILY  ceFAZolin (ANCEF) 2g IVPB in 50 mL D5W  2 g IntraVENous Q8H  
 metoprolol tartrate (LOPRESSOR) tablet 75 mg  75 mg Oral Q12H  
 bacitracin 500 unit/gram packet 1 Packet  1 Packet Topical PRN  
 sodium chloride (NS) flush 10-30 mL  10-30 mL InterCATHeter PRN  
  sodium chloride (NS) flush 10 mL  10 mL InterCATHeter PRN  
 sodium chloride (NS) flush 10-40 mL  10-40 mL InterCATHeter Q8H  
 sodium chloride (NS) flush 20 mL  20 mL InterCATHeter Q24H  
 loperamide (IMODIUM) capsule 4 mg  4 mg Oral Q6H PRN  
 venlafaxine (EFFEXOR) tablet 75 mg  75 mg Oral QHS  pantoprazole (PROTONIX) tablet 40 mg  40 mg Oral QHS  acetaminophen (TYLENOL) tablet 650 mg  650 mg Oral Q6H PRN  
 ondansetron (ZOFRAN) injection 4 mg  4 mg IntraVENous Q6H PRN  
 heparin (porcine) injection 5,000 Units  5,000 Units SubCUTAneous Q8H  
 hydrALAZINE (APRESOLINE) 20 mg/mL injection 10 mg  10 mg IntraVENous Q6H PRN  predniSONE (DELTASONE) tablet 10 mg  10 mg Oral DAILY WITH BREAKFAST  morphine injection 2 mg  2 mg IntraVENous Q4H PRN  
 sodium chloride (NS) flush 5-10 mL  5-10 mL IntraVENous PRN  
 oxyCODONE-acetaminophen (PERCOCET) 5-325 mg per tablet 2 Tab  2 Tab Oral Q4H PRN  
 glucose chewable tablet 16 g  16 g Oral PRN  
 glucagon (GLUCAGEN) injection 1 mg  1 mg IntraMUSCular PRN  
 dextrose (D50) infusion 12.5-25 g  25-50 mL IntraVENous PRN  
 sodium chloride (NS) flush 5-10 mL  5-10 mL IntraVENous PRN  
 
 
 
 
 
 Yes

## (undated) DEVICE — 10 FRENCH DRAIN SYSTEM, STERILE: Brand: TLS

## (undated) DEVICE — TUBE PORT-A-CUL SWAB 11ML ST

## (undated) DEVICE — SOLUTION IRRIG 3000ML 0.9% SOD CHL FLX CONT 0797208] ICU MEDICAL INC]

## (undated) DEVICE — KIT PROC EXTRM HND FT CUST LF --

## (undated) DEVICE — SUTURE PROL SZ 3-0 L18IN NONABSORBABLE BLU L19MM PC-5 3/8 8632G

## (undated) DEVICE — BANDAGE COMPR W4INXL5YD BGE COHESIVE SELF ADH ADBAN CBN1104] AVCOR HEALTHCARE PRODUCTS INC]

## (undated) DEVICE — INCISE SURGICAL DRAPE: Brand: OPSITE INCISE 14X25CM CTN 20

## (undated) DEVICE — SUTURE VCRL SZ 3-0 L27IN ABSRB UD L26MM SH 1/2 CIR J416H

## (undated) DEVICE — INTENDED FOR TISSUE SEPARATION, AND OTHER PROCEDURES THAT REQUIRE A SHARP SURGICAL BLADE TO PUNCTURE OR CUT.: Brand: BARD-PARKER ® CARBON RIB-BACK BLADES

## (undated) DEVICE — CULTURETTE SGL EVAC TUBE PALL -- 100/CA

## (undated) DEVICE — SOLUTION IV 1000ML 0.9% SOD CHL

## (undated) DEVICE — BANDAGE COMPR 9 FTX4 IN SMOOTH COMFORTABLE SYNTH ESMRK LF

## (undated) DEVICE — KERLIX BANDAGE ROLL: Brand: KERLIX

## (undated) DEVICE — OCCLUSIVE GAUZE STRIP,3% BISMUTH TRIBROMOPHENATE IN PETROLATUM BLEND: Brand: XEROFORM

## (undated) DEVICE — KENDALL SCD EXPRESS SLEEVES, KNEE LENGTH, MEDIUM: Brand: KENDALL SCD

## (undated) DEVICE — BNDG CMPR ELAS KNT VEL STD 4IN -- MEDICHOICE

## (undated) DEVICE — IODOFORM PACKING STRIP: Brand: CURITY

## (undated) DEVICE — SYR 10ML CTRL LR LCK NSAF LF --

## (undated) DEVICE — NEEDLE HYPO 25GA L1.5IN BLU POLYPR HUB S STL REG BVL STR

## (undated) DEVICE — OCCLUSIVE GAUZE STRIP OVERWRAP,3% BISMUTH TRIBROMOPHENATE IN PETROLATUM BLEND: Brand: XEROFORM

## (undated) DEVICE — REM POLYHESIVE ADULT PATIENT RETURN ELECTRODE: Brand: VALLEYLAB

## (undated) DEVICE — TELFA NON-ADHERENT PADS PREPAK: Brand: TELFA

## (undated) DEVICE — SOL IRR NACL 0.9% 500ML POUR --

## (undated) DEVICE — SYRINGE MED 20ML STD CLR PLAS LUERLOCK TIP N CTRL DISP

## (undated) DEVICE — HANDPIECE SET WITH SOFT TISSUE TIP AND SUCTION TUBE: Brand: INTERPULSE

## (undated) DEVICE — CURITY STRETCH BANDAGE: Brand: CURITY

## (undated) DEVICE — PREP SKN CHLRAPRP 26ML TNT -- CONVERT TO ITEM 373320

## (undated) DEVICE — TOWEL SURG W16XL26IN BLU NONFENESTRATED DLX ST 2 PER PK

## (undated) DEVICE — U-DRAPE: Brand: CONVERTORS

## (undated) DEVICE — MEDI-VAC YANKAUER SUCTION HANDLE W/BULBOUS TIP: Brand: CARDINAL HEALTH

## (undated) DEVICE — DISPOSABLE TOURNIQUET CUFF SINGLE BLADDER, SINGLE PORT AND QUICK CONNECT CONNECTOR: Brand: COLOR CUFF